# Patient Record
Sex: MALE | Race: WHITE | NOT HISPANIC OR LATINO | Employment: FULL TIME | URBAN - METROPOLITAN AREA
[De-identification: names, ages, dates, MRNs, and addresses within clinical notes are randomized per-mention and may not be internally consistent; named-entity substitution may affect disease eponyms.]

---

## 2017-01-08 DIAGNOSIS — N40.1 BPH WITH URINARY OBSTRUCTION: ICD-10-CM

## 2017-01-08 DIAGNOSIS — N13.8 BPH WITH URINARY OBSTRUCTION: ICD-10-CM

## 2017-01-09 RX ORDER — TAMSULOSIN HYDROCHLORIDE 0.4 MG/1
CAPSULE ORAL
Qty: 90 CAPSULE | Refills: 3 | Status: SHIPPED | OUTPATIENT
Start: 2017-01-09 | End: 2017-08-15 | Stop reason: SDUPTHER

## 2017-05-27 DIAGNOSIS — I10 ESSENTIAL HYPERTENSION: ICD-10-CM

## 2017-05-30 RX ORDER — LOSARTAN POTASSIUM AND HYDROCHLOROTHIAZIDE 12.5; 5 MG/1; MG/1
TABLET ORAL
Qty: 90 TABLET | Refills: 0 | Status: SHIPPED | OUTPATIENT
Start: 2017-05-30 | End: 2017-08-15 | Stop reason: SDUPTHER

## 2017-08-02 ENCOUNTER — TELEPHONE (OUTPATIENT)
Dept: INTERNAL MEDICINE | Facility: CLINIC | Age: 57
End: 2017-08-02

## 2017-08-02 DIAGNOSIS — Z00.00 ANNUAL PHYSICAL EXAM: Primary | ICD-10-CM

## 2017-08-02 NOTE — TELEPHONE ENCOUNTER
----- Message from Maribel Alejandra sent at 8/2/2017 12:10 PM CDT -----  Contact: self   Doctor appointment and lab have been scheduled.  Please link lab orders to the lab appointment.  Date of doctor appointment:  08/15  Physical or EP:  Physical  Date of lab appointment:  08/08  Comments:

## 2017-08-08 ENCOUNTER — LAB VISIT (OUTPATIENT)
Dept: LAB | Facility: HOSPITAL | Age: 57
End: 2017-08-08
Attending: INTERNAL MEDICINE
Payer: COMMERCIAL

## 2017-08-08 DIAGNOSIS — Z11.59 NEED FOR HEPATITIS C SCREENING TEST: ICD-10-CM

## 2017-08-08 DIAGNOSIS — Z00.00 ANNUAL PHYSICAL EXAM: ICD-10-CM

## 2017-08-08 LAB
ALBUMIN SERPL BCP-MCNC: 3.8 G/DL
ALP SERPL-CCNC: 67 U/L
ALT SERPL W/O P-5'-P-CCNC: 16 U/L
ANION GAP SERPL CALC-SCNC: 9 MMOL/L
AST SERPL-CCNC: 15 U/L
BASOPHILS # BLD AUTO: 0.01 K/UL
BASOPHILS NFR BLD: 0.2 %
BILIRUB SERPL-MCNC: 1 MG/DL
BUN SERPL-MCNC: 16 MG/DL
CALCIUM SERPL-MCNC: 9.5 MG/DL
CHLORIDE SERPL-SCNC: 104 MMOL/L
CHOLEST/HDLC SERPL: 4.6 {RATIO}
CO2 SERPL-SCNC: 26 MMOL/L
COMPLEXED PSA SERPL-MCNC: 3.3 NG/ML
CREAT SERPL-MCNC: 1.1 MG/DL
DIFFERENTIAL METHOD: ABNORMAL
EOSINOPHIL # BLD AUTO: 0.1 K/UL
EOSINOPHIL NFR BLD: 2.1 %
ERYTHROCYTE [DISTWIDTH] IN BLOOD BY AUTOMATED COUNT: 12.7 %
EST. GFR  (AFRICAN AMERICAN): >60 ML/MIN/1.73 M^2
EST. GFR  (NON AFRICAN AMERICAN): >60 ML/MIN/1.73 M^2
GLUCOSE SERPL-MCNC: 107 MG/DL
HCT VFR BLD AUTO: 41.2 %
HCV AB SERPL QL IA: NEGATIVE
HDL/CHOLESTEROL RATIO: 21.8 %
HDLC SERPL-MCNC: 225 MG/DL
HDLC SERPL-MCNC: 49 MG/DL
HGB BLD-MCNC: 13.9 G/DL
LDLC SERPL CALC-MCNC: 143.8 MG/DL
LYMPHOCYTES # BLD AUTO: 1.6 K/UL
LYMPHOCYTES NFR BLD: 33 %
MCH RBC QN AUTO: 30 PG
MCHC RBC AUTO-ENTMCNC: 33.7 G/DL
MCV RBC AUTO: 89 FL
MONOCYTES # BLD AUTO: 0.3 K/UL
MONOCYTES NFR BLD: 6.7 %
NEUTROPHILS # BLD AUTO: 2.8 K/UL
NEUTROPHILS NFR BLD: 58 %
NONHDLC SERPL-MCNC: 176 MG/DL
PLATELET # BLD AUTO: 204 K/UL
PMV BLD AUTO: 12.2 FL
POTASSIUM SERPL-SCNC: 4.3 MMOL/L
PROT SERPL-MCNC: 6.8 G/DL
RBC # BLD AUTO: 4.64 M/UL
SODIUM SERPL-SCNC: 139 MMOL/L
TRIGL SERPL-MCNC: 161 MG/DL
TSH SERPL DL<=0.005 MIU/L-ACNC: 3.19 UIU/ML
WBC # BLD AUTO: 4.79 K/UL

## 2017-08-08 PROCEDURE — 36415 COLL VENOUS BLD VENIPUNCTURE: CPT | Mod: PO

## 2017-08-08 PROCEDURE — 84153 ASSAY OF PSA TOTAL: CPT

## 2017-08-08 PROCEDURE — 85025 COMPLETE CBC W/AUTO DIFF WBC: CPT

## 2017-08-08 PROCEDURE — 84443 ASSAY THYROID STIM HORMONE: CPT

## 2017-08-08 PROCEDURE — 80061 LIPID PANEL: CPT

## 2017-08-08 PROCEDURE — 86803 HEPATITIS C AB TEST: CPT

## 2017-08-08 PROCEDURE — 80053 COMPREHEN METABOLIC PANEL: CPT

## 2017-08-15 ENCOUNTER — OFFICE VISIT (OUTPATIENT)
Dept: INTERNAL MEDICINE | Facility: CLINIC | Age: 57
End: 2017-08-15
Payer: COMMERCIAL

## 2017-08-15 VITALS
HEIGHT: 67 IN | WEIGHT: 170.63 LBS | HEART RATE: 84 BPM | DIASTOLIC BLOOD PRESSURE: 70 MMHG | SYSTOLIC BLOOD PRESSURE: 110 MMHG | TEMPERATURE: 98 F | BODY MASS INDEX: 26.78 KG/M2

## 2017-08-15 DIAGNOSIS — K60.2 ANAL FISSURE: ICD-10-CM

## 2017-08-15 DIAGNOSIS — N40.1 BPH WITH URINARY OBSTRUCTION: ICD-10-CM

## 2017-08-15 DIAGNOSIS — L98.9 SCALP LESION: ICD-10-CM

## 2017-08-15 DIAGNOSIS — Z00.00 ANNUAL PHYSICAL EXAM: Primary | ICD-10-CM

## 2017-08-15 DIAGNOSIS — N13.8 BPH WITH URINARY OBSTRUCTION: ICD-10-CM

## 2017-08-15 DIAGNOSIS — I10 ESSENTIAL HYPERTENSION: Chronic | ICD-10-CM

## 2017-08-15 PROCEDURE — 99999 PR PBB SHADOW E&M-EST. PATIENT-LVL III: CPT | Mod: PBBFAC,,, | Performed by: INTERNAL MEDICINE

## 2017-08-15 PROCEDURE — 99396 PREV VISIT EST AGE 40-64: CPT | Mod: S$GLB,,, | Performed by: INTERNAL MEDICINE

## 2017-08-15 RX ORDER — LOSARTAN POTASSIUM AND HYDROCHLOROTHIAZIDE 12.5; 5 MG/1; MG/1
1 TABLET ORAL DAILY
Qty: 90 TABLET | Refills: 3 | Status: SHIPPED | OUTPATIENT
Start: 2017-08-15 | End: 2017-11-03

## 2017-08-15 NOTE — PROGRESS NOTES
Subjective:       Patient ID: Nilton Chin is a 57 y.o. male.    Chief Complaint: Annual Exam    HPI   57y.o. Male here for annual exam.      Cholesterol: (normal)  Vaccines: Influenza (declined); Tetanus (2015)  Eye exam: 2016  Colonoscopy: 2015- NL per pt     Exercise: walks 3x/wk  Diet: regular     Past Medical History:    Allergy                                                       BPH (benign prostatic hypertrophy)                            History of prostatitis                                        Hypertension                                                History reviewed. No pertinent surgical history.  Social History    Marital status:              Spouse name:                       Years of education:                 Number of children: 3              Occupational History  Occupation          Employer            Comment                                              Social History Main Topics    Smoking status: Former Smoker                                                                Packs/day: 0.00      Years: 35.00          Types: Cigarettes       Start date: 6/24/2013    Smokeless status: Never Used                        Comment: 6 cig a day    Alcohol use: Yes                Comment: socially/ not weekly    Drug use: No              Sexual activity: Yes               Partners with: Female      -- Codeine -- Nausea Only   -- Vicodin [Hydrocodone-Acetaminophen]     --  unknown  Mr. Chin had no medications administered during this visit.  Review of Systems   Constitutional: Negative for activity change, appetite change, chills, diaphoresis, fatigue, fever and unexpected weight change.   HENT: Negative for congestion, mouth sores, postnasal drip, rhinorrhea, sinus pressure, sneezing, sore throat, trouble swallowing and voice change.    Eyes: Negative for discharge, itching and visual disturbance.   Respiratory: Negative for cough, chest tightness, shortness of breath and  wheezing.    Cardiovascular: Negative for chest pain, palpitations and leg swelling.   Gastrointestinal: Negative for abdominal pain, blood in stool, constipation, diarrhea, nausea and vomiting.   Endocrine: Negative for cold intolerance and heat intolerance.   Genitourinary: Negative for difficulty urinating, dysuria, flank pain, hematuria and urgency.   Musculoskeletal: Negative for arthralgias, back pain, myalgias and neck pain.   Skin: Negative for rash and wound.   Allergic/Immunologic: Negative for environmental allergies and food allergies.   Neurological: Negative for dizziness, tremors, seizures, syncope, weakness and headaches.   Hematological: Negative for adenopathy. Does not bruise/bleed easily.   Psychiatric/Behavioral: Negative for confusion, sleep disturbance and suicidal ideas. The patient is not nervous/anxious.        Objective:      Physical Exam   Constitutional: He is oriented to person, place, and time. He appears well-developed and well-nourished. No distress.   HENT:   Head: Normocephalic and atraumatic.   Right Ear: External ear normal.   Left Ear: External ear normal.   Nose: Nose normal.   Mouth/Throat: Oropharynx is clear and moist. No oropharyngeal exudate.   Eyes: Conjunctivae and EOM are normal. Pupils are equal, round, and reactive to light. Right eye exhibits no discharge. Left eye exhibits no discharge. No scleral icterus.   Neck: Normal range of motion. Neck supple. No JVD present. No thyromegaly present.   Cardiovascular: Normal rate, regular rhythm, normal heart sounds and intact distal pulses.    No murmur heard.  Pulmonary/Chest: Effort normal and breath sounds normal. No respiratory distress. He has no wheezes. He has no rales.   Abdominal: Soft. Bowel sounds are normal. He exhibits no distension. There is no tenderness. There is no guarding.   Musculoskeletal: He exhibits no edema.   Lymphadenopathy:     He has no cervical adenopathy.   Neurological: He is alert and oriented  to person, place, and time. He has normal reflexes. No cranial nerve deficit. Coordination normal.   Skin: Skin is warm and dry. No rash noted. He is not diaphoretic. No pallor.   Psychiatric: He has a normal mood and affect. Judgment normal.       Assessment:       1. Annual physical exam    2. Essential hypertension    3. BPH with urinary obstruction    4. Anal fissure    5. Scalp lesion        Plan:    1. Blood work reviewed with pt and was satisfactory       Vaccines: Influenza (declined); Tetanus (2015)       Eye exam: 2016       Colonoscopy: 2015- NL per pt   2. HTN- controlled on Hyzaar 50-12.5 mg daily    3. BPH- stable on Flomax 0.4 mg daily       Followed by Urology    4. Anal fissure- stable on topical meds, followed by GI   5. Referral to Derm   6. F/u in 1 yr or PRN

## 2017-09-21 ENCOUNTER — OFFICE VISIT (OUTPATIENT)
Dept: DERMATOLOGY | Facility: CLINIC | Age: 57
End: 2017-09-21
Payer: COMMERCIAL

## 2017-09-21 DIAGNOSIS — L81.4 LENTIGO: ICD-10-CM

## 2017-09-21 DIAGNOSIS — L82.1 SK (SEBORRHEIC KERATOSIS): ICD-10-CM

## 2017-09-21 DIAGNOSIS — D18.00 ANGIOMA: ICD-10-CM

## 2017-09-21 DIAGNOSIS — L57.0 AK (ACTINIC KERATOSIS): Primary | ICD-10-CM

## 2017-09-21 DIAGNOSIS — D22.9 NEVUS: ICD-10-CM

## 2017-09-21 PROCEDURE — 17000 DESTRUCT PREMALG LESION: CPT | Mod: S$GLB,,, | Performed by: DERMATOLOGY

## 2017-09-21 PROCEDURE — 3008F BODY MASS INDEX DOCD: CPT | Mod: S$GLB,,, | Performed by: DERMATOLOGY

## 2017-09-21 PROCEDURE — 99999 PR PBB SHADOW E&M-EST. PATIENT-LVL II: CPT | Mod: PBBFAC,,, | Performed by: DERMATOLOGY

## 2017-09-21 PROCEDURE — 99202 OFFICE O/P NEW SF 15 MIN: CPT | Mod: 25,S$GLB,, | Performed by: DERMATOLOGY

## 2017-09-21 NOTE — PROGRESS NOTES
Subjective:       Patient ID:  Nilton Chin is a 57 y.o. male who presents for   Chief Complaint   Patient presents with    Skin Check     Pt coming in today for UBSE. C/o white raised area on right scalp.  Raised. Not itching or bleeding. No tx   Pt has hx of extensive sun as a child.  Now he wears a hat regularly   Had mole removed  In the past and was benign per patient.         Review of Systems   Constitutional: Negative for fever, chills, fatigue and malaise.   Skin: Positive for activity-related sunscreen use and wears hat.   Hematologic/Lymphatic: Does not bruise/bleed easily.        Objective:    Physical Exam   Constitutional: He appears well-developed and well-nourished. No distress.   Neurological: He is alert and oriented to person, place, and time. He is not disoriented.   Psychiatric: He has a normal mood and affect.   Skin:   Areas Examined (abnormalities noted in diagram):   Scalp / Hair Palpated and Inspected  Head / Face Inspection Performed  Neck Inspection Performed  Chest / Axilla Inspection Performed  Abdomen Inspection Performed  Back Inspection Performed  RUE Inspected  LUE Inspection Performed  Nails and Digits Inspection Performed                   Diagram Legend     Erythematous scaling macule/papule c/w actinic keratosis       Vascular papule c/w angioma      Pigmented verrucoid papule/plaque c/w seborrheic keratosis      Yellow umbilicated papule c/w sebaceous hyperplasia      Irregularly shaped tan macule c/w lentigo     1-2 mm smooth white papules consistent with Milia      Movable subcutaneous cyst with punctum c/w epidermal inclusion cyst      Subcutaneous movable cyst c/w pilar cyst      Firm pink to brown papule c/w dermatofibroma      Pedunculated fleshy papule(s) c/w skin tag(s)      Evenly pigmented macule c/w junctional nevus     Mildly variegated pigmented, slightly irregular-bordered macule c/w mildly atypical nevus      Flesh colored to evenly pigmented papule c/w  intradermal nevus       Pink pearly papule/plaque c/w basal cell carcinoma      Erythematous hyperkeratotic cursted plaque c/w SCC      Surgical scar with no sign of skin cancer recurrence      Open and closed comedones      Inflammatory papules and pustules      Verrucoid papule consistent consistent with wart     Erythematous eczematous patches and plaques     Dystrophic onycholytic nail with subungual debris c/w onychomycosis     Umbilicated papule    Erythematous-base heme-crusted tan verrucoid plaque consistent with inflamed seborrheic keratosis     Erythematous Silvery Scaling Plaque c/w Psoriasis     See annotation      Assessment / Plan:        AK (actinic keratosis)  Cryosurgery Procedure Note    Verbal consent from the patient is obtained and the patient is aware of the precancerous quality and need for treatment of these lesions. Liquid nitrogen cryosurgery is applied to the 1 actinic keratoses, as detailed in the physical exam, to produce a freeze injury. The patient is aware that blisters may form and is instructed on wound care with gentle cleansing and use of vaseline ointment to keep moist until healed. The patient is supplied a handout on cryosurgery and is instructed to call if lesions do not completely resolve.    SK (seborrheic keratosis)  These are benign inherited growths without a malignant potential. Reassurance given to patient. No treatment is necessary.     Lentigo  This is a benign hyperpigmented sun induced lesion. Daily sun protection will reduce the number of new lesions. Treatment of these benign lesions are considered cosmetic.  The nature of sun-induced photo-aging and skin cancers is discussed.  Sun avoidance, protective clothing, and the use of 30-SPF sunscreens is advised. Observe closely for skin damage/changes, and call if such occurs.    Nevus  Discussed ABCDE's of nevi.  Monitor for new mole or moles that are becoming bigger, darker, irritated, or developing irregular  borders. Brochure provided.    Angioma  These are benign vascular lesions that are inherited.  Treatment is not necessary.    Upper body skin examination performed today including at least 6 points as noted in physical examination. No lesions suspicious for malignancy noted.             Return in about 1 year (around 9/21/2018).

## 2017-11-01 ENCOUNTER — LAB VISIT (OUTPATIENT)
Dept: LAB | Facility: HOSPITAL | Age: 57
End: 2017-11-01
Attending: INTERNAL MEDICINE
Payer: COMMERCIAL

## 2017-11-01 DIAGNOSIS — N13.8 BPH WITH URINARY OBSTRUCTION: ICD-10-CM

## 2017-11-01 DIAGNOSIS — N40.1 BPH WITH URINARY OBSTRUCTION: ICD-10-CM

## 2017-11-01 LAB — COMPLEXED PSA SERPL-MCNC: 2.8 NG/ML

## 2017-11-01 PROCEDURE — 36415 COLL VENOUS BLD VENIPUNCTURE: CPT | Mod: PO

## 2017-11-01 PROCEDURE — 84153 ASSAY OF PSA TOTAL: CPT

## 2017-11-03 DIAGNOSIS — I10 ESSENTIAL HYPERTENSION: ICD-10-CM

## 2017-11-03 RX ORDER — LOSARTAN POTASSIUM AND HYDROCHLOROTHIAZIDE 12.5; 5 MG/1; MG/1
TABLET ORAL
Qty: 90 TABLET | Refills: 3 | Status: SHIPPED | OUTPATIENT
Start: 2017-11-03 | End: 2018-12-02 | Stop reason: SDUPTHER

## 2017-11-08 ENCOUNTER — OFFICE VISIT (OUTPATIENT)
Dept: UROLOGY | Facility: CLINIC | Age: 57
End: 2017-11-08
Payer: COMMERCIAL

## 2017-11-08 VITALS
SYSTOLIC BLOOD PRESSURE: 146 MMHG | RESPIRATION RATE: 16 BRPM | WEIGHT: 165.13 LBS | BODY MASS INDEX: 25.92 KG/M2 | DIASTOLIC BLOOD PRESSURE: 84 MMHG | HEIGHT: 67 IN | HEART RATE: 77 BPM

## 2017-11-08 DIAGNOSIS — N13.8 BPH WITH URINARY OBSTRUCTION: Primary | ICD-10-CM

## 2017-11-08 DIAGNOSIS — N40.1 BPH WITH URINARY OBSTRUCTION: Primary | ICD-10-CM

## 2017-11-08 PROCEDURE — 99999 PR PBB SHADOW E&M-EST. PATIENT-LVL III: CPT | Mod: PBBFAC,,, | Performed by: NURSE PRACTITIONER

## 2017-11-08 PROCEDURE — 99214 OFFICE O/P EST MOD 30 MIN: CPT | Mod: S$GLB,,, | Performed by: NURSE PRACTITIONER

## 2017-11-08 RX ORDER — CICLOPIROX 80 MG/ML
SOLUTION TOPICAL
Refills: 3 | COMMUNITY
Start: 2017-10-06 | End: 2019-08-21

## 2017-11-08 NOTE — PROGRESS NOTES
Subjective:       Patient ID: Nilton Chin is a 57 y.o. male.    Chief Complaint: Benign Prostatic Hypertrophy    Nilton Chin is a 57 y.o. Male with history of BPH.  (-) family history for Prostate Cancer.  Last clinic visit was 11/02/2016  He was seen in past with Dr. Lopez 6/17/2015 for BPH and male pelvic pain    Today he reports no complaints.  FOS is good; nocturia 1-2x depending on fluid intake.  Flomax daily; PRN Mobic 15mg.    No issues with ED.                                  PSA                  2.8                 11/01/2017                 PSA                      3.3                 08/08/2017        PSA                  2.3                 10/26/2016                 PSA                      2.3                 06/02/2016        PSA                  2.2                 09/17/2015                 PSA                  1.9                 09/17/2014                 PSA                  1.2                 09/04/2013               PSA                      0.80                11/05/2012                 PSA                      2.26                09/27/2012                 PSA                      1.1                 07/15/2011                 PSA                      0.95                10/29/2009                 PSA                      0.74                10/08/2008                             Past Medical History:    Allergy                                                       BPH (benign prostatic hypertrophy)                            History of prostatitis                                        Hypertension                                                  History reviewed. No pertinent surgical history.    Review of patient's family history indicates:    Cataracts                      Mother                    Hypertension                   Mother                    Cataracts                      Father                    Glaucoma                       Father                    Hypertension                    Father                    Blindness                      Other                     Cancer                         Paternal Grandfather        Comment: lung/prostate    Diabetes                       Paternal Grandfather      Diabetes                       Maternal Grandmother      Heart disease                  Maternal Grandmother      Amblyopia                      Neg Hx                    Strabismus                     Neg Hx                    Retinal detachment             Neg Hx                    Macular degeneration           Neg Hx                    Stroke                         Neg Hx                      Social History    Marital status:              Spouse name:                       Years of education:                 Number of children: 3             Occupational History  Occupation          Employer            Comment                                             Social History Main Topics    Smoking status: Former Smoker                                                                Packs/day: 0.00      Years: 35.00          Types: Cigarettes       Start date: 6/24/2013    Smokeless status: Never Used                        Comment: 6 cig a day    Alcohol use: Yes                Comment: socially/ not weekly    Drug use: No              Sexual activity: Yes               Partners with: Female    Other Topics            Concern    None on file    Social History Narrative    None on file        Allergies:  Codeine and Vicodin (hydrocodone-acetaminophen)    Medications:  Current Outpatient Prescriptions:   losartan-hydrochlorothiazide 50-12.5 mg (HYZAAR) 50-12.5 mg per tablet, Take 1 tablet by mouth once daily., Disp: 90 tablet, Rfl: 3  tamsulosin (FLOMAX) 0.4 mg Cp24, TAKE ONE CAPSULE BY MOUTH ONCE DAILY., Disp: 90 capsule, Rfl: 0          Review of Systems   Constitutional: Negative.  Negative for activity change, appetite change and fever.   HENT: Negative.   Negative for facial swelling and trouble swallowing.    Eyes: Negative.    Respiratory: Negative.  Negative for shortness of breath.    Cardiovascular: Negative.  Negative for chest pain and palpitations.   Gastrointestinal: Negative for abdominal pain, constipation, diarrhea, nausea and vomiting.   Genitourinary: Negative for difficulty urinating, dysuria, enuresis, flank pain, frequency, genital sores, hematuria, nocturia, penile pain, scrotal swelling, testicular pain and urgency.        FOS is good  Nocturia 1-2  Frequency during day due to fluid pill for HTN.     Musculoskeletal: Negative for back pain, gait problem and neck stiffness.   Skin: Negative.  Negative for wound.   Neurological: Negative for dizziness, tremors, seizures, syncope, speech difficulty, light-headedness and headaches.   Hematological: Does not bruise/bleed easily.   Psychiatric/Behavioral: Negative for confusion and hallucinations. The patient is not nervous/anxious.        Objective:      Physical Exam   Nursing note and vitals reviewed.  Constitutional: He is oriented to person, place, and time. Vital signs are normal. He appears well-developed and well-nourished. He is active and cooperative.  Non-toxic appearance. He does not have a sickly appearance.   HENT:   Head: Normocephalic and atraumatic.   Right Ear: External ear normal.   Left Ear: External ear normal.   Nose: Nose normal.   Mouth/Throat: Mucous membranes are normal.   Eyes: Conjunctivae and lids are normal. No scleral icterus.   Neck: Trachea normal, normal range of motion and full passive range of motion without pain. Neck supple. No JVD present. No tracheal deviation present.   Cardiovascular: Normal rate, regular rhythm, S1 normal and S2 normal.    Pulmonary/Chest: Effort normal and breath sounds normal. No respiratory distress. He exhibits no tenderness.   Abdominal: Soft. Normal appearance and bowel sounds are normal. There is no hepatosplenomegaly. There is no  tenderness. There is no rebound, no guarding and no CVA tenderness.   Genitourinary: Rectum normal, testes normal and penis normal. Prostate is enlarged. Prostate is not tender. No penile tenderness.       Musculoskeletal: Normal range of motion.   Lymphadenopathy: No inguinal adenopathy noted on the right or left side.   Neurological: He is alert and oriented to person, place, and time. He has normal strength.   Skin: Skin is warm, dry and intact.     Psychiatric: He has a normal mood and affect. His behavior is normal. Judgment and thought content normal.       Assessment:       1. BPH with urinary obstruction        Plan:         I spent 25 minutes with the patient of which more than half was spent in direct consultation with the patient in regards to our treatment and plan.    Education and recommendations of today's plan of care including home remedies.  We discussed BPH and his LUTS  Diet modifications; daily exercise  Reviewed PSA numbers; the ups downs and previous over the past few years  Discussed the contributory factors for changes; voiced understanding  RTC one year year PSA (will see PCP who checks PSA before one year).

## 2017-11-29 ENCOUNTER — OFFICE VISIT (OUTPATIENT)
Dept: OPTOMETRY | Facility: CLINIC | Age: 57
End: 2017-11-29
Payer: COMMERCIAL

## 2017-11-29 DIAGNOSIS — Z83.511 FAMILY HISTORY OF GLAUCOMA: ICD-10-CM

## 2017-11-29 DIAGNOSIS — H04.123 DRY EYES, BILATERAL: Primary | ICD-10-CM

## 2017-11-29 PROCEDURE — 92015 DETERMINE REFRACTIVE STATE: CPT | Mod: S$GLB,,, | Performed by: OPTOMETRIST

## 2017-11-29 PROCEDURE — 92014 COMPRE OPH EXAM EST PT 1/>: CPT | Mod: S$GLB,,, | Performed by: OPTOMETRIST

## 2017-11-29 PROCEDURE — 99999 PR PBB SHADOW E&M-EST. PATIENT-LVL II: CPT | Mod: PBBFAC,,, | Performed by: OPTOMETRIST

## 2017-11-29 NOTE — PROGRESS NOTES
HPI     DLS: 11/16/2016  Pt states no va changes. Pt states he wear +2.50 otc readers. States he   has been using AT's more than usual. Denies f/f    Systane Bal ou BID/PRN     Hyp/presby   Fhx glaucoma   Ocular Ros Hx --see prev notes  Transient RUL Myokymia     Last edited by Arnulfo Blake, OD on 11/29/2017  8:46 AM. (History)        ROS     Positive for: Skin (rosacea), Eyes (ocular rosacea hx)    Negative for: Constitutional, Gastrointestinal, Neurological,   Genitourinary, Musculoskeletal, HENT, Endocrine, Cardiovascular,   Respiratory, Psychiatric, Allergic/Imm, Heme/Lymph    Last edited by Arnulfo Blake, OD on 11/29/2017  8:46 AM. (History)        Assessment /Plan     For exam results, see Encounter Report.    Dry eyes, bilateral    Family history of glaucoma        1. hyp/Presby--pt happy w otc readers  2. Family History of Glaucoma.  Advised yearly exam   3. Ocular rosacea hx--see prev notes.  Pt doing well on otc SYSTANE ATs for now      PLAN:    rtc 1 yr

## 2017-11-30 DIAGNOSIS — N40.1 BPH WITH URINARY OBSTRUCTION: ICD-10-CM

## 2017-11-30 DIAGNOSIS — N13.8 BPH WITH URINARY OBSTRUCTION: ICD-10-CM

## 2017-12-01 RX ORDER — TAMSULOSIN HYDROCHLORIDE 0.4 MG/1
CAPSULE ORAL
Qty: 90 CAPSULE | Refills: 3 | Status: SHIPPED | OUTPATIENT
Start: 2017-12-01 | End: 2018-12-01 | Stop reason: SDUPTHER

## 2017-12-22 ENCOUNTER — OFFICE VISIT (OUTPATIENT)
Dept: URGENT CARE | Facility: CLINIC | Age: 57
End: 2017-12-22
Payer: COMMERCIAL

## 2017-12-22 VITALS
RESPIRATION RATE: 16 BRPM | BODY MASS INDEX: 25.9 KG/M2 | HEART RATE: 100 BPM | SYSTOLIC BLOOD PRESSURE: 126 MMHG | TEMPERATURE: 97 F | WEIGHT: 165 LBS | DIASTOLIC BLOOD PRESSURE: 89 MMHG | HEIGHT: 67 IN | OXYGEN SATURATION: 98 %

## 2017-12-22 DIAGNOSIS — J40 BRONCHITIS: Primary | ICD-10-CM

## 2017-12-22 DIAGNOSIS — R05.9 COUGH: ICD-10-CM

## 2017-12-22 PROCEDURE — 99214 OFFICE O/P EST MOD 30 MIN: CPT | Mod: S$GLB,,, | Performed by: FAMILY MEDICINE

## 2017-12-22 RX ORDER — HYDROCODONE POLISTIREX AND CHLORPHENIRAMINE POLISTIREX 10; 8 MG/5ML; MG/5ML
5 SUSPENSION, EXTENDED RELEASE ORAL EVERY 12 HOURS PRN
Qty: 100 ML | Refills: 0 | Status: SHIPPED | OUTPATIENT
Start: 2017-12-22 | End: 2018-01-01

## 2017-12-22 RX ORDER — AZITHROMYCIN 250 MG/1
TABLET, FILM COATED ORAL
Qty: 6 TABLET | Refills: 0 | Status: SHIPPED | OUTPATIENT
Start: 2017-12-22 | End: 2018-06-20 | Stop reason: ALTCHOICE

## 2017-12-22 NOTE — PROGRESS NOTES
"Subjective:       Patient ID: Nilton Chin is a 57 y.o. male.    Vitals:  height is 5' 7" (1.702 m) and weight is 74.8 kg (165 lb). His temperature is 97.1 °F (36.2 °C). His blood pressure is 126/89 and his pulse is 100. His respiration is 16 and oxygen saturation is 98%.     Chief Complaint: URI    Pt states sympotms started on Monday and have gotten worse      URI    This is a new problem. The current episode started in the past 7 days. The problem has been gradually worsening. There has been no fever. Associated symptoms include congestion and coughing. Pertinent negatives include no abdominal pain, chest pain, ear pain, headaches, nausea, sore throat or wheezing. He has tried nothing for the symptoms.     Review of Systems   Constitution: Negative for chills, fever and malaise/fatigue.   HENT: Positive for congestion. Negative for ear pain, hoarse voice and sore throat.    Eyes: Negative for discharge and redness.   Cardiovascular: Negative for chest pain, dyspnea on exertion and leg swelling.   Respiratory: Positive for cough and sputum production. Negative for shortness of breath and wheezing.    Musculoskeletal: Negative for myalgias.   Gastrointestinal: Negative for abdominal pain and nausea.   Neurological: Negative for headaches.       Objective:      Physical Exam   Constitutional: He is oriented to person, place, and time. He appears well-developed and well-nourished.   HENT:   Head: Normocephalic and atraumatic.   Right Ear: External ear normal.   Left Ear: External ear normal.   Eyes: EOM are normal. Pupils are equal, round, and reactive to light.   Neck: Normal range of motion. Neck supple. No JVD present. No tracheal deviation present. No thyromegaly present.   Cardiovascular: Normal rate, regular rhythm and normal heart sounds.  Exam reveals no gallop and no friction rub.    No murmur heard.  Pulmonary/Chest: Breath sounds normal. No respiratory distress. He has no wheezes. He has no rales. He " exhibits no tenderness.   Abdominal: Soft. Bowel sounds are normal. He exhibits no distension and no mass. There is no tenderness. There is no rebound and no guarding. No hernia.   Musculoskeletal: Normal range of motion. He exhibits no edema, tenderness or deformity.   Lymphadenopathy:     He has no cervical adenopathy.   Neurological: He is alert and oriented to person, place, and time. He displays normal reflexes. No cranial nerve deficit. He exhibits normal muscle tone. Coordination normal.   Skin: Skin is warm. Capillary refill takes less than 2 seconds. No rash noted. No erythema. No pallor.   Psychiatric: He has a normal mood and affect. His behavior is normal. Judgment and thought content normal.   Vitals reviewed.      Assessment:       1. Bronchitis    2. Cough        Plan:         Bronchitis  -     azithromycin (Z-SONDRA) 250 MG tablet; Take 2 tablets by mouth x 1 for day 1 Then take 1 tablet by mouth daily for day 2 - 5  Dispense: 6 tablet; Refill: 0  -     hydrocodone-chlorpheniramine (TUSSIONEX) 10-8 mg/5 mL suspension; Take 5 mLs by mouth every 12 (twelve) hours as needed for Cough.  Dispense: 100 mL; Refill: 0    Cough      Follow up with your doctor in a few days as needed.  Return to the urgent care or go to the ER if symptoms get worse.    Jez Hill MD

## 2017-12-22 NOTE — PATIENT INSTRUCTIONS
What Is Acute Bronchitis?  Acute bronchitis is when the airways in your lungs (bronchial tubes) become red and swollen (inflamed). It is usually caused by a viral infection. But it can also occur because of a bacteria or allergen. Symptoms include a cough that produces yellow or greenish mucus and can last for days or sometimes weeks.  Inside healthy lungs    Air travels in and out of the lungs through the airways. The linings of these airways produce sticky mucus. This mucus traps particles that enter the lungs. Tiny structures called cilia then sweep the particles out of the airways.     Healthy airway: Airways are normally open. Air moves in and out easily.      Healthy cilia: Tiny, hairlike cilia sweep mucus and particles up and out of the airways.   Lungs with bronchitis  Bronchitis often occurs with a cold or the flu virus. The airways become inflamed (red and swollen). There is a deep hacking cough from the extra mucus. Other symptoms may include:  · Wheezing  · Chest discomfort  · Shortness of breath  · Mild fever  A second infection, this time due to bacteria, may then occur. And airways irritated by allergens or smoke are more likely to get infected.        Inflamed airway: Inflammation and extra mucus narrow the airway, causing shortness of breath.      Impaired cilia: Extra mucus impairs cilia, causing congestion and wheezing. Smoking makes the problem worse.   Making a diagnosis  A physical exam, health history, and certain tests help your healthcare provider make the diagnosis.  Health history  Your healthcare provider will ask you about your symptoms.  The exam  Your provider listens to your chest for signs of congestion. He or she may also check your ears, nose, and throat.  Possible tests  · A sputum test for bacteria. This requires a sample of mucus from your lungs.  · A nasal or throat swab. This tests to see if you have a bacterial infection.  · A chest X-ray. This is done if your healthcare  provider thinks you have pneumonia.  · Tests to check for an underlying condition. Other tests may be done to check for things such as allergies, asthma, or COPD (chronic obstructive pulmonary disease). You may need to see a specialist for more lung function testing.  Treating a cough  The main treatment for bronchitis is easing symptoms. Avoiding smoke, allergens, and other things that trigger coughing can often help. If the infection is bacterial, you may be given antibiotics. During the illness, it's important to get plenty of sleep. To ease symptoms:  · Dont smoke. Also avoid secondhand smoke.  · Use a humidifier. Or try breathing in steam from a hot shower. This may help loosen mucus.  · Drink a lot of water and juice. They can soothe the throat and may help thin mucus.  · Sit up or use extra pillows when in bed. This helps to lessen coughing and congestion.  · Ask your provider about using medicine. Ask about using cough medicine, pain and fever medicine, or a decongestant.  Antibiotics  Most cases of bronchitis are caused by cold or flu viruses. They dont need antibiotics to treat them, even if your mucus is thick and green or yellow. Antibiotics dont treat viral illness and antibiotics have not been shown to have any benefit in cases of acute bronchitis. Taking antibiotics when they are not needed increases your risk of getting an infection later that is antibiotic-resistant. Antibiotics can also cause severe cases of diarrhea that require other antibiotics to treat.  It is important that you accept your healthcare provider's opinion to not use antibiotics. Your provider will prescribe antibiotics if the infection is caused by bacteria. If they are prescribed:  · Take all of the medicine. Take the medicine until it is used up, even if symptoms have improved. If you dont, the bronchitis may come back.  · Take the medicines as directed. For instance, some medicines should be taken with food.  · Ask about  side effects. Ask your provider or pharmacist what side effects are common, and what to do about them.  Follow-up care  You should see your provider again in 2 to 3 weeks. By this time, symptoms should have improved. An infection that lasts longer may mean you have a more serious problem.  Prevention  · Avoid tobacco smoke. If you smoke, quit. Stay away from smoky places. Ask friends and family not to smoke around you, or in your home or car.  · Get checked for allergies.  · Ask your provider about getting a yearly flu shot. Also ask about pneumococcal or pneumonia shots.  · Wash your hands often. This helps reduce the chance of picking up viruses that cause colds and flu.  Call your healthcare provider if:  · Symptoms worsen, or you have new symptoms  · Breathing problems worsen or  become severe  · Symptoms dont get better within a week, or within 3 days of taking antibiotics   Date Last Reviewed: 2/1/2017  © 3397-6882 [employer]. [address] [place] [address]. All rights reserved. This information is not intended as a substitute for professional medical care. Always follow your healthcare professional's instructions.      Follow up with your doctor in a few days as needed.  Return to the urgent care or go to the ER if symptoms get worse.    Jez Hill MD

## 2018-02-26 ENCOUNTER — OFFICE VISIT (OUTPATIENT)
Dept: URGENT CARE | Facility: CLINIC | Age: 58
End: 2018-02-26
Payer: COMMERCIAL

## 2018-02-26 VITALS
WEIGHT: 165 LBS | RESPIRATION RATE: 19 BRPM | HEIGHT: 67 IN | TEMPERATURE: 97 F | DIASTOLIC BLOOD PRESSURE: 80 MMHG | BODY MASS INDEX: 25.9 KG/M2 | SYSTOLIC BLOOD PRESSURE: 135 MMHG | OXYGEN SATURATION: 97 % | HEART RATE: 98 BPM

## 2018-02-26 DIAGNOSIS — J20.9 ACUTE PURULENT BRONCHITIS: Primary | ICD-10-CM

## 2018-02-26 PROCEDURE — 96372 THER/PROPH/DIAG INJ SC/IM: CPT | Mod: S$GLB,,, | Performed by: INTERNAL MEDICINE

## 2018-02-26 PROCEDURE — 3008F BODY MASS INDEX DOCD: CPT | Mod: S$GLB,,, | Performed by: INTERNAL MEDICINE

## 2018-02-26 PROCEDURE — 99213 OFFICE O/P EST LOW 20 MIN: CPT | Mod: 25,S$GLB,, | Performed by: INTERNAL MEDICINE

## 2018-02-26 RX ORDER — BETAMETHASONE SODIUM PHOSPHATE AND BETAMETHASONE ACETATE 3; 3 MG/ML; MG/ML
9 INJECTION, SUSPENSION INTRA-ARTICULAR; INTRALESIONAL; INTRAMUSCULAR; SOFT TISSUE ONCE
Status: COMPLETED | OUTPATIENT
Start: 2018-02-26 | End: 2018-02-26

## 2018-02-26 RX ORDER — AZITHROMYCIN 250 MG/1
TABLET, FILM COATED ORAL
Qty: 6 TABLET | Refills: 0 | Status: SHIPPED | OUTPATIENT
Start: 2018-02-26 | End: 2018-06-20 | Stop reason: ALTCHOICE

## 2018-02-26 RX ADMIN — BETAMETHASONE SODIUM PHOSPHATE AND BETAMETHASONE ACETATE 9 MG: 3; 3 INJECTION, SUSPENSION INTRA-ARTICULAR; INTRALESIONAL; INTRAMUSCULAR; SOFT TISSUE at 09:02

## 2018-02-26 NOTE — PROGRESS NOTES
"Subjective:       Patient ID: Nilton Chin is a 57 y.o. male.    Vitals:  height is 5' 7" (1.702 m) and weight is 74.8 kg (165 lb). His oral temperature is 97.1 °F (36.2 °C). His blood pressure is 135/80 and his pulse is 98. His respiration is 19 and oxygen saturation is 97%.     Chief Complaint: Cough    Cough   This is a new problem. The current episode started in the past 7 days. The problem has been unchanged. The problem occurs constantly. The cough is productive of sputum. Associated symptoms include postnasal drip. Pertinent negatives include no chest pain, chills, ear pain, eye redness, fever, headaches, myalgias, sore throat, shortness of breath or wheezing. Nothing aggravates the symptoms. He has tried OTC cough suppressant and prescription cough suppressant for the symptoms. The treatment provided mild relief.     Review of Systems   Constitution: Negative for chills, fever and malaise/fatigue.   HENT: Positive for postnasal drip. Negative for congestion, ear pain, hoarse voice and sore throat.    Eyes: Negative for discharge and redness.   Cardiovascular: Negative for chest pain, dyspnea on exertion and leg swelling.   Respiratory: Positive for cough and sputum production. Negative for shortness of breath and wheezing.    Musculoskeletal: Negative for myalgias.   Gastrointestinal: Negative for abdominal pain and nausea.   Neurological: Negative for headaches.       Objective:      Physical Exam   Constitutional: He appears well-developed and well-nourished.   HENT:   Head: Normocephalic and atraumatic.   Eyes: Conjunctivae and EOM are normal. Pupils are equal, round, and reactive to light.   Neck: Normal range of motion. Neck supple.   Cardiovascular: Normal rate and regular rhythm.    Pulmonary/Chest: Effort normal.   Upper airway congestion with scant exp wheeze   Nursing note and vitals reviewed.      Assessment:       1. Acute purulent bronchitis        Plan:         Acute purulent bronchitis  -    "  betamethasone acetate-betamethasone sodium phosphate injection 9 mg; Inject 1.5 mLs (9 mg total) into the muscle once.  -     azithromycin (ZITHROMAX Z-SONDRA) 250 MG tablet; Take 2 tablets (500 mg) on  Day 1,  followed by 1 tablet (250 mg) once daily on Days 2 through 5.  Dispense: 6 tablet; Refill: 0

## 2018-05-16 ENCOUNTER — TELEPHONE (OUTPATIENT)
Dept: INTERNAL MEDICINE | Facility: CLINIC | Age: 58
End: 2018-05-16

## 2018-05-16 DIAGNOSIS — Z00.00 ANNUAL PHYSICAL EXAM: Primary | ICD-10-CM

## 2018-05-16 NOTE — TELEPHONE ENCOUNTER
----- Message from Heather Holliday sent at 5/16/2018 12:01 PM CDT -----  Contact: Patient [phone]   I have scheduled the labs and physical. Labs orders need to be placed an linked to the appt.     Date of Physical: 08/15/2018    Date of Lab: 08/08/2018    Please call and advise.    Thank you

## 2018-06-20 ENCOUNTER — OFFICE VISIT (OUTPATIENT)
Dept: URGENT CARE | Facility: CLINIC | Age: 58
End: 2018-06-20
Payer: COMMERCIAL

## 2018-06-20 VITALS
TEMPERATURE: 100 F | BODY MASS INDEX: 25.9 KG/M2 | RESPIRATION RATE: 18 BRPM | HEART RATE: 96 BPM | OXYGEN SATURATION: 99 % | DIASTOLIC BLOOD PRESSURE: 83 MMHG | SYSTOLIC BLOOD PRESSURE: 135 MMHG | WEIGHT: 165 LBS | HEIGHT: 67 IN

## 2018-06-20 DIAGNOSIS — J98.01 ACUTE BRONCHOSPASM: ICD-10-CM

## 2018-06-20 DIAGNOSIS — J01.90 ACUTE BACTERIAL SINUSITIS: Primary | ICD-10-CM

## 2018-06-20 DIAGNOSIS — J20.9 ACUTE PURULENT BRONCHITIS: ICD-10-CM

## 2018-06-20 DIAGNOSIS — B96.89 ACUTE BACTERIAL SINUSITIS: Primary | ICD-10-CM

## 2018-06-20 PROCEDURE — 3008F BODY MASS INDEX DOCD: CPT | Mod: CPTII,S$GLB,, | Performed by: INTERNAL MEDICINE

## 2018-06-20 PROCEDURE — 99214 OFFICE O/P EST MOD 30 MIN: CPT | Mod: 25,S$GLB,, | Performed by: INTERNAL MEDICINE

## 2018-06-20 PROCEDURE — 3075F SYST BP GE 130 - 139MM HG: CPT | Mod: CPTII,S$GLB,, | Performed by: INTERNAL MEDICINE

## 2018-06-20 PROCEDURE — 96372 THER/PROPH/DIAG INJ SC/IM: CPT | Mod: S$GLB,,, | Performed by: INTERNAL MEDICINE

## 2018-06-20 PROCEDURE — 3079F DIAST BP 80-89 MM HG: CPT | Mod: CPTII,S$GLB,, | Performed by: INTERNAL MEDICINE

## 2018-06-20 RX ORDER — BETAMETHASONE SODIUM PHOSPHATE AND BETAMETHASONE ACETATE 3; 3 MG/ML; MG/ML
9 INJECTION, SUSPENSION INTRA-ARTICULAR; INTRALESIONAL; INTRAMUSCULAR; SOFT TISSUE ONCE
Status: COMPLETED | OUTPATIENT
Start: 2018-06-20 | End: 2018-06-20

## 2018-06-20 RX ORDER — AMOXICILLIN AND CLAVULANATE POTASSIUM 875; 125 MG/1; MG/1
1 TABLET, FILM COATED ORAL EVERY 12 HOURS
Qty: 20 TABLET | Refills: 0 | Status: SHIPPED | OUTPATIENT
Start: 2018-06-20 | End: 2018-06-30

## 2018-06-20 RX ADMIN — BETAMETHASONE SODIUM PHOSPHATE AND BETAMETHASONE ACETATE 9 MG: 3; 3 INJECTION, SUSPENSION INTRA-ARTICULAR; INTRALESIONAL; INTRAMUSCULAR; SOFT TISSUE at 09:06

## 2018-06-20 NOTE — PROGRESS NOTES
"Subjective:       Patient ID: Nilton Chin is a 58 y.o. male.    Vitals:  height is 5' 7" (1.702 m) and weight is 74.8 kg (165 lb). His oral temperature is 100.2 °F (37.9 °C). His blood pressure is 135/83 and his pulse is 96. His respiration is 18 and oxygen saturation is 99%.     Chief Complaint: URI    URI    This is a new problem. The current episode started 1 to 4 weeks ago. The problem has been unchanged. There has been no fever. Associated symptoms include congestion, coughing, rhinorrhea and sinus pain. Pertinent negatives include no abdominal pain, chest pain, ear pain, headaches, nausea, sore throat or wheezing. He has tried antihistamine and decongestant (Claritin D and delsym) for the symptoms. The treatment provided no relief.     Review of Systems   Constitution: Negative for chills, fever and malaise/fatigue.   HENT: Positive for congestion, rhinorrhea and sinus pain. Negative for ear pain, hoarse voice and sore throat.    Eyes: Negative for discharge and redness.   Cardiovascular: Negative for chest pain, dyspnea on exertion and leg swelling.   Respiratory: Positive for cough and sputum production. Negative for shortness of breath and wheezing.    Musculoskeletal: Negative for myalgias.   Gastrointestinal: Negative for abdominal pain and nausea.   Neurological: Negative for headaches.       Objective:      Physical Exam   Constitutional: He appears well-developed and well-nourished.   HENT:   Head: Normocephalic and atraumatic.   Nose: Mucosal edema (purulent discharge) present. Right sinus exhibits maxillary sinus tenderness. Left sinus exhibits maxillary sinus tenderness.   Eyes: Conjunctivae and EOM are normal. Pupils are equal, round, and reactive to light.   Neck: Normal range of motion. Neck supple.   Cardiovascular: Normal rate and regular rhythm.    Pulmonary/Chest: Effort normal.   Diffuse airway congestion with end exp wheeze   Nursing note and vitals reviewed.      Assessment:       1. " Acute bacterial sinusitis    2. Acute purulent bronchitis    3. Acute bronchospasm        Plan:         Acute bacterial sinusitis  -     betamethasone acetate-betamethasone sodium phosphate injection 9 mg; Inject 1.5 mLs (9 mg total) into the muscle once.  -     amoxicillin-clavulanate 875-125mg (AUGMENTIN) 875-125 mg per tablet; Take 1 tablet by mouth every 12 (twelve) hours.  Dispense: 20 tablet; Refill: 0    Acute purulent bronchitis  -     betamethasone acetate-betamethasone sodium phosphate injection 9 mg; Inject 1.5 mLs (9 mg total) into the muscle once.  -     amoxicillin-clavulanate 875-125mg (AUGMENTIN) 875-125 mg per tablet; Take 1 tablet by mouth every 12 (twelve) hours.  Dispense: 20 tablet; Refill: 0    Acute bronchospasm  -     betamethasone acetate-betamethasone sodium phosphate injection 9 mg; Inject 1.5 mLs (9 mg total) into the muscle once.

## 2018-06-23 ENCOUNTER — OFFICE VISIT (OUTPATIENT)
Dept: URGENT CARE | Facility: CLINIC | Age: 58
End: 2018-06-23
Payer: COMMERCIAL

## 2018-06-23 VITALS
BODY MASS INDEX: 25.9 KG/M2 | HEART RATE: 83 BPM | TEMPERATURE: 98 F | OXYGEN SATURATION: 99 % | WEIGHT: 165 LBS | DIASTOLIC BLOOD PRESSURE: 88 MMHG | HEIGHT: 67 IN | SYSTOLIC BLOOD PRESSURE: 133 MMHG

## 2018-06-23 DIAGNOSIS — J20.9 ACUTE PURULENT BRONCHITIS: Primary | ICD-10-CM

## 2018-06-23 DIAGNOSIS — J98.01 ACUTE BRONCHOSPASM: ICD-10-CM

## 2018-06-23 PROCEDURE — 96372 THER/PROPH/DIAG INJ SC/IM: CPT | Mod: S$GLB,,, | Performed by: INTERNAL MEDICINE

## 2018-06-23 PROCEDURE — 3008F BODY MASS INDEX DOCD: CPT | Mod: CPTII,S$GLB,, | Performed by: INTERNAL MEDICINE

## 2018-06-23 PROCEDURE — 3075F SYST BP GE 130 - 139MM HG: CPT | Mod: CPTII,S$GLB,, | Performed by: INTERNAL MEDICINE

## 2018-06-23 PROCEDURE — 99213 OFFICE O/P EST LOW 20 MIN: CPT | Mod: 25,S$GLB,, | Performed by: INTERNAL MEDICINE

## 2018-06-23 PROCEDURE — 3079F DIAST BP 80-89 MM HG: CPT | Mod: CPTII,S$GLB,, | Performed by: INTERNAL MEDICINE

## 2018-06-23 RX ORDER — LEVOFLOXACIN 500 MG/1
500 TABLET, FILM COATED ORAL DAILY
Qty: 10 TABLET | Refills: 0 | Status: SHIPPED | OUTPATIENT
Start: 2018-06-23 | End: 2018-07-03

## 2018-06-23 RX ORDER — BETAMETHASONE SODIUM PHOSPHATE AND BETAMETHASONE ACETATE 3; 3 MG/ML; MG/ML
9 INJECTION, SUSPENSION INTRA-ARTICULAR; INTRALESIONAL; INTRAMUSCULAR; SOFT TISSUE ONCE
Status: COMPLETED | OUTPATIENT
Start: 2018-06-23 | End: 2018-06-23

## 2018-06-23 RX ORDER — CODEINE PHOSPHATE AND GUAIFENESIN 10; 100 MG/5ML; MG/5ML
5 SOLUTION ORAL EVERY 4 HOURS PRN
Qty: 118 ML | Refills: 0 | Status: SHIPPED | OUTPATIENT
Start: 2018-06-23 | End: 2018-07-03

## 2018-06-23 RX ADMIN — BETAMETHASONE SODIUM PHOSPHATE AND BETAMETHASONE ACETATE 9 MG: 3; 3 INJECTION, SUSPENSION INTRA-ARTICULAR; INTRALESIONAL; INTRAMUSCULAR; SOFT TISSUE at 06:06

## 2018-06-23 NOTE — PROGRESS NOTES
"Subjective:       Patient ID: Nilton Chin is a 58 y.o. male.    Vitals:  height is 5' 7" (1.702 m) and weight is 74.8 kg (165 lb). His temperature is 97.8 °F (36.6 °C). His blood pressure is 133/88 and his pulse is 83. His oxygen saturation is 99%.     Chief Complaint: Cough    Cough   This is a new problem. The current episode started in the past 7 days. The problem has been gradually worsening. The problem occurs every few minutes. The cough is non-productive. The symptoms are aggravated by lying down. Treatments tried: patient wants codiene cough syrup. The treatment provided no relief.     Review of Systems   Respiratory: Positive for cough.        Objective:      Physical Exam   Constitutional: He appears well-developed and well-nourished.   HENT:   Head: Normocephalic and atraumatic.   Eyes: Conjunctivae and EOM are normal. Pupils are equal, round, and reactive to light.   Neck: Normal range of motion. Neck supple.   Cardiovascular: Normal rate and regular rhythm.    Pulmonary/Chest: Effort normal.   Diffuse congestion with exp wheeze   Nursing note and vitals reviewed.      Assessment:       1. Acute purulent bronchitis    2. Acute bronchospasm        Plan:         Acute purulent bronchitis  -     betamethasone acetate-betamethasone sodium phosphate injection 9 mg; Inject 1.5 mLs (9 mg total) into the muscle once.  -     levoFLOXacin (LEVAQUIN) 500 MG tablet; Take 1 tablet (500 mg total) by mouth once daily. for 10 days  Dispense: 10 tablet; Refill: 0  -     guaifenesin-codeine 100-10 mg/5 ml (CHERATUSSIN AC)  mg/5 mL syrup; Take 5 mLs by mouth every 4 (four) hours as needed for Cough.  Dispense: 118 mL; Refill: 0    Acute bronchospasm  -     betamethasone acetate-betamethasone sodium phosphate injection 9 mg; Inject 1.5 mLs (9 mg total) into the muscle once.         "

## 2018-08-08 ENCOUNTER — LAB VISIT (OUTPATIENT)
Dept: LAB | Facility: HOSPITAL | Age: 58
End: 2018-08-08
Attending: INTERNAL MEDICINE
Payer: COMMERCIAL

## 2018-08-08 DIAGNOSIS — Z00.00 ANNUAL PHYSICAL EXAM: ICD-10-CM

## 2018-08-08 LAB
ALBUMIN SERPL BCP-MCNC: 4.1 G/DL
ALP SERPL-CCNC: 60 U/L
ALT SERPL W/O P-5'-P-CCNC: 29 U/L
ANION GAP SERPL CALC-SCNC: 8 MMOL/L
AST SERPL-CCNC: 25 U/L
BASOPHILS # BLD AUTO: 0.03 K/UL
BASOPHILS NFR BLD: 0.6 %
BILIRUB SERPL-MCNC: 1.5 MG/DL
BUN SERPL-MCNC: 14 MG/DL
CALCIUM SERPL-MCNC: 9.8 MG/DL
CHLORIDE SERPL-SCNC: 102 MMOL/L
CHOLEST SERPL-MCNC: 247 MG/DL
CHOLEST/HDLC SERPL: 4.9 {RATIO}
CO2 SERPL-SCNC: 30 MMOL/L
COMPLEXED PSA SERPL-MCNC: 3.5 NG/ML
CREAT SERPL-MCNC: 1 MG/DL
DIFFERENTIAL METHOD: NORMAL
EOSINOPHIL # BLD AUTO: 0.1 K/UL
EOSINOPHIL NFR BLD: 1.7 %
ERYTHROCYTE [DISTWIDTH] IN BLOOD BY AUTOMATED COUNT: 12.4 %
EST. GFR  (AFRICAN AMERICAN): >60 ML/MIN/1.73 M^2
EST. GFR  (NON AFRICAN AMERICAN): >60 ML/MIN/1.73 M^2
GLUCOSE SERPL-MCNC: 94 MG/DL
HCT VFR BLD AUTO: 44.2 %
HDLC SERPL-MCNC: 50 MG/DL
HDLC SERPL: 20.2 %
HGB BLD-MCNC: 14.3 G/DL
IMM GRANULOCYTES # BLD AUTO: 0.02 K/UL
IMM GRANULOCYTES NFR BLD AUTO: 0.4 %
LDLC SERPL CALC-MCNC: 172 MG/DL
LYMPHOCYTES # BLD AUTO: 1.6 K/UL
LYMPHOCYTES NFR BLD: 29.8 %
MCH RBC QN AUTO: 30.5 PG
MCHC RBC AUTO-ENTMCNC: 32.4 G/DL
MCV RBC AUTO: 94 FL
MONOCYTES # BLD AUTO: 0.5 K/UL
MONOCYTES NFR BLD: 9.2 %
NEUTROPHILS # BLD AUTO: 3.1 K/UL
NEUTROPHILS NFR BLD: 58.3 %
NONHDLC SERPL-MCNC: 197 MG/DL
NRBC BLD-RTO: 0 /100 WBC
PLATELET # BLD AUTO: 209 K/UL
PMV BLD AUTO: 12.6 FL
POTASSIUM SERPL-SCNC: 4.4 MMOL/L
PROT SERPL-MCNC: 7.1 G/DL
RBC # BLD AUTO: 4.69 M/UL
SODIUM SERPL-SCNC: 140 MMOL/L
TRIGL SERPL-MCNC: 125 MG/DL
TSH SERPL DL<=0.005 MIU/L-ACNC: 3.5 UIU/ML
WBC # BLD AUTO: 5.33 K/UL

## 2018-08-08 PROCEDURE — 36415 COLL VENOUS BLD VENIPUNCTURE: CPT | Mod: PO

## 2018-08-08 PROCEDURE — 85025 COMPLETE CBC W/AUTO DIFF WBC: CPT

## 2018-08-08 PROCEDURE — 80053 COMPREHEN METABOLIC PANEL: CPT

## 2018-08-08 PROCEDURE — 84443 ASSAY THYROID STIM HORMONE: CPT

## 2018-08-08 PROCEDURE — 84153 ASSAY OF PSA TOTAL: CPT

## 2018-08-08 PROCEDURE — 80061 LIPID PANEL: CPT

## 2018-08-15 ENCOUNTER — OFFICE VISIT (OUTPATIENT)
Dept: INTERNAL MEDICINE | Facility: CLINIC | Age: 58
End: 2018-08-15
Payer: COMMERCIAL

## 2018-08-15 VITALS
TEMPERATURE: 99 F | BODY MASS INDEX: 26.26 KG/M2 | HEART RATE: 68 BPM | SYSTOLIC BLOOD PRESSURE: 130 MMHG | HEIGHT: 67 IN | DIASTOLIC BLOOD PRESSURE: 80 MMHG | WEIGHT: 167.31 LBS | RESPIRATION RATE: 18 BRPM

## 2018-08-15 DIAGNOSIS — N40.1 BPH WITH URINARY OBSTRUCTION: ICD-10-CM

## 2018-08-15 DIAGNOSIS — Z00.00 ANNUAL PHYSICAL EXAM: Primary | ICD-10-CM

## 2018-08-15 DIAGNOSIS — K60.2 ANAL FISSURE: ICD-10-CM

## 2018-08-15 DIAGNOSIS — E78.00 HYPERCHOLESTEREMIA: ICD-10-CM

## 2018-08-15 DIAGNOSIS — I10 ESSENTIAL HYPERTENSION: Chronic | ICD-10-CM

## 2018-08-15 DIAGNOSIS — N13.8 BPH WITH URINARY OBSTRUCTION: ICD-10-CM

## 2018-08-15 PROCEDURE — 3079F DIAST BP 80-89 MM HG: CPT | Mod: CPTII,S$GLB,, | Performed by: INTERNAL MEDICINE

## 2018-08-15 PROCEDURE — 99999 PR PBB SHADOW E&M-EST. PATIENT-LVL III: CPT | Mod: PBBFAC,,, | Performed by: INTERNAL MEDICINE

## 2018-08-15 PROCEDURE — 3075F SYST BP GE 130 - 139MM HG: CPT | Mod: CPTII,S$GLB,, | Performed by: INTERNAL MEDICINE

## 2018-08-15 PROCEDURE — 99396 PREV VISIT EST AGE 40-64: CPT | Mod: S$GLB,,, | Performed by: INTERNAL MEDICINE

## 2018-08-15 NOTE — PROGRESS NOTES
Subjective:       Patient ID: Nilton Chin is a 58 y.o. male.    Chief Complaint: Annual Exam    HPI   58 y.o. Male here for annual exam.      Cholesterol: (normal)  Vaccines: Influenza (declined); Tetanus (2015)  Eye exam: 2016  Colonoscopy: 2015- NL per pt     Exercise: walks 3x/wk  Diet: regular     Past Medical History:    Allergy                                                       BPH (benign prostatic hypertrophy)                            History of prostatitis                                        Hypertension                                                History reviewed. No pertinent surgical history.  Social History    Marital status:              Spouse name:                       Years of education:                 Number of children: 3              Occupational History  Occupation          Employer            Comment                                              Social History Main Topics    Smoking status: Former Smoker                                                                Packs/day: 0.00      Years: 35.00          Types: Cigarettes       Start date: 6/24/2013    Smokeless status: Never Used                        Comment: 6 cig a day    Alcohol use: Yes                Comment: socially/ not weekly    Drug use: No              Sexual activity: Yes               Partners with: Female      -- Codeine -- Nausea Only   -- Vicodin [Hydrocodone-Acetaminophen]     --  unknown  Mr. Chin had no medications administered during this visit.  Review of Systems   Constitutional: Negative for activity change, appetite change, chills, diaphoresis, fatigue, fever and unexpected weight change.   HENT: Negative for congestion, mouth sores, postnasal drip, rhinorrhea, sinus pressure, sneezing, sore throat, trouble swallowing and voice change.    Eyes: Negative for discharge, itching and visual disturbance.   Respiratory: Negative for cough, chest tightness, shortness of breath and  wheezing.    Cardiovascular: Negative for chest pain, palpitations and leg swelling.   Gastrointestinal: Negative for abdominal pain, blood in stool, constipation, diarrhea, nausea and vomiting.   Endocrine: Negative for cold intolerance and heat intolerance.   Genitourinary: Negative for difficulty urinating, dysuria, flank pain, hematuria and urgency.   Musculoskeletal: Negative for arthralgias, back pain, myalgias and neck pain.   Skin: Negative for rash and wound.   Allergic/Immunologic: Negative for environmental allergies and food allergies.   Neurological: Negative for dizziness, tremors, seizures, syncope, weakness and headaches.   Hematological: Negative for adenopathy. Does not bruise/bleed easily.   Psychiatric/Behavioral: Negative for confusion, sleep disturbance and suicidal ideas. The patient is not nervous/anxious.        Objective:      Physical Exam   Constitutional: He is oriented to person, place, and time. He appears well-developed and well-nourished. No distress.   HENT:   Head: Normocephalic and atraumatic.   Right Ear: External ear normal.   Left Ear: External ear normal.   Nose: Nose normal.   Mouth/Throat: Oropharynx is clear and moist. No oropharyngeal exudate.   Eyes: Conjunctivae and EOM are normal. Pupils are equal, round, and reactive to light. Right eye exhibits no discharge. Left eye exhibits no discharge. No scleral icterus.   Neck: Normal range of motion. Neck supple. No JVD present. No thyromegaly present.   Cardiovascular: Normal rate, regular rhythm, normal heart sounds and intact distal pulses.   No murmur heard.  Pulmonary/Chest: Effort normal and breath sounds normal. No respiratory distress. He has no wheezes. He has no rales.   Abdominal: Soft. Bowel sounds are normal. He exhibits no distension. There is no tenderness. There is no guarding.   Musculoskeletal: He exhibits no edema.   Lymphadenopathy:     He has no cervical adenopathy.   Neurological: He is alert and oriented  to person, place, and time. No cranial nerve deficit. Coordination normal.   Skin: Skin is warm and dry. No rash noted. He is not diaphoretic. No pallor.   Psychiatric: He has a normal mood and affect. Judgment normal.       Assessment:       1. Annual physical exam    2. Essential hypertension    3. BPH with urinary obstruction    4. Anal fissure    5. Hypercholesteremia        Plan:    1. Blood work reviewed with pt and was satisfactory       Vaccines: Influenza (declined); Tetanus (2015)       Eye exam: 2016       Colonoscopy: 2015- NL per pt   2. HTN- controlled on Hyzaar 50-12.5 mg daily    3. BPH- stable on Flomax 0.4 mg daily       Followed by Urology    4. Anal fissure- stable on topical meds, followed by GI   5. HLD- pt advised on proper diet       Lipids in 3 months    6. F/u in 1 yr or PRN

## 2018-08-30 ENCOUNTER — OFFICE VISIT (OUTPATIENT)
Dept: OPTOMETRY | Facility: CLINIC | Age: 58
End: 2018-08-30
Payer: COMMERCIAL

## 2018-08-30 DIAGNOSIS — H04.123 DRY EYES, BILATERAL: Primary | ICD-10-CM

## 2018-08-30 PROCEDURE — 92012 INTRM OPH EXAM EST PATIENT: CPT | Mod: S$GLB,,, | Performed by: OPTOMETRIST

## 2018-08-30 PROCEDURE — 99999 PR PBB SHADOW E&M-EST. PATIENT-LVL II: CPT | Mod: PBBFAC,,, | Performed by: OPTOMETRIST

## 2018-08-30 RX ORDER — TOBRAMYCIN AND DEXAMETHASONE 3; 1 MG/ML; MG/ML
1 SUSPENSION/ DROPS OPHTHALMIC 4 TIMES DAILY
Qty: 5 ML | Refills: 0 | Status: SHIPPED | OUTPATIENT
Start: 2018-08-30 | End: 2018-09-09

## 2018-08-30 NOTE — PROGRESS NOTES
ROSITA GONZALEZ 11/2017 with Dr. Blake Has gritty feeling in eyes, told he has   ocular rosacea.  Thinks he's having a flare up and needs antibiotic drops.   Symptoms seem to have worsened over the past month, feels like twice a   week he's having a flare up.  Using Systane more frequently up to 5 times   a day, had been using only twice a day.    Last edited by Monica Gutierrez on 8/30/2018  8:15 AM. (History)            Assessment /Plan     For exam results, see Encounter Report.    Dry eyes, bilateral  -     tobramycin-dexamethasone 0.3-0.1% (TOBRADEX) 0.3-0.1 % DrpS; Place 1 drop into both eyes 4 (four) times daily. for 10 days  Dispense: 5 mL; Refill: 0      1. Start Tobradex 4x/day x 10 days, if better can restart Systane Balance 4x/day. RTC if no better after 10 days.

## 2018-11-07 ENCOUNTER — LAB VISIT (OUTPATIENT)
Dept: LAB | Facility: HOSPITAL | Age: 58
End: 2018-11-07
Attending: INTERNAL MEDICINE
Payer: COMMERCIAL

## 2018-11-07 DIAGNOSIS — N40.1 BPH WITH URINARY OBSTRUCTION: ICD-10-CM

## 2018-11-07 DIAGNOSIS — N13.8 BPH WITH URINARY OBSTRUCTION: ICD-10-CM

## 2018-11-07 LAB — COMPLEXED PSA SERPL-MCNC: 3 NG/ML

## 2018-11-07 PROCEDURE — 84153 ASSAY OF PSA TOTAL: CPT

## 2018-11-07 PROCEDURE — 36415 COLL VENOUS BLD VENIPUNCTURE: CPT | Mod: PO

## 2018-11-21 ENCOUNTER — OFFICE VISIT (OUTPATIENT)
Dept: UROLOGY | Facility: CLINIC | Age: 58
End: 2018-11-21
Payer: COMMERCIAL

## 2018-11-21 VITALS
HEART RATE: 72 BPM | HEIGHT: 67 IN | DIASTOLIC BLOOD PRESSURE: 88 MMHG | BODY MASS INDEX: 25.9 KG/M2 | RESPIRATION RATE: 15 BRPM | SYSTOLIC BLOOD PRESSURE: 138 MMHG | WEIGHT: 165 LBS

## 2018-11-21 DIAGNOSIS — N13.8 BPH WITH URINARY OBSTRUCTION: Primary | ICD-10-CM

## 2018-11-21 DIAGNOSIS — N40.1 BPH WITH URINARY OBSTRUCTION: Primary | ICD-10-CM

## 2018-11-21 LAB
BILIRUB SERPL-MCNC: NORMAL MG/DL
BLOOD URINE, POC: NORMAL
COLOR, POC UA: YELLOW
GLUCOSE UR QL STRIP: NORMAL
KETONES UR QL STRIP: NORMAL
LEUKOCYTE ESTERASE URINE, POC: NORMAL
NITRITE, POC UA: NORMAL
PH, POC UA: 5
PROTEIN, POC: NORMAL
SPECIFIC GRAVITY, POC UA: 1.02
UROBILINOGEN, POC UA: NORMAL

## 2018-11-21 PROCEDURE — 3079F DIAST BP 80-89 MM HG: CPT | Mod: CPTII,S$GLB,, | Performed by: NURSE PRACTITIONER

## 2018-11-21 PROCEDURE — 99214 OFFICE O/P EST MOD 30 MIN: CPT | Mod: 25,S$GLB,, | Performed by: NURSE PRACTITIONER

## 2018-11-21 PROCEDURE — 81002 URINALYSIS NONAUTO W/O SCOPE: CPT | Mod: S$GLB,,, | Performed by: NURSE PRACTITIONER

## 2018-11-21 PROCEDURE — 3075F SYST BP GE 130 - 139MM HG: CPT | Mod: CPTII,S$GLB,, | Performed by: NURSE PRACTITIONER

## 2018-11-21 PROCEDURE — 3008F BODY MASS INDEX DOCD: CPT | Mod: CPTII,S$GLB,, | Performed by: NURSE PRACTITIONER

## 2018-11-21 PROCEDURE — 99999 PR PBB SHADOW E&M-EST. PATIENT-LVL III: CPT | Mod: PBBFAC,,, | Performed by: NURSE PRACTITIONER

## 2018-11-21 NOTE — PROGRESS NOTES
Subjective:       Patient ID: Nilton Chin is a 58 y.o. male.    Chief Complaint: BPH with urinary obstruction (yearly f/u)    Nilton Chin is a 57 y.o. Male with history of BPH.  He takes Flomax for his LUTS.  (-) family history for Prostate Cancer.  He was seen in past with Dr. Lopez 6/17/2015 for BPH and male pelvic pain    Last clinic visit was 11/08/2017  Today he reports no new complaints.  FOS is good; nocturia 1-2x depending on fluid intake.  PRN Mobic 15mg.    No issues with ED.                                      PSA                  3.0                 11/07/2018                 PSA                      3.5                 08/08/2018         PSA                  2.8                 11/01/2017                 PSA                      3.3                 08/08/2017        PSA                  2.3                 10/26/2016                 PSA                      2.3                 06/02/2016        PSA                  2.2                 09/17/2015                 PSA                  1.9                 09/17/2014                 PSA                  1.2                 09/04/2013               PSA                      0.80                11/05/2012                 PSA                      2.26                09/27/2012                 PSA                      1.1                 07/15/2011                 PSA                      0.95                10/29/2009                 PSA                      0.74                10/08/2008                             Past Medical History:    Allergy                                                       BPH (benign prostatic hypertrophy)                            History of prostatitis                                        Hypertension                                                  History reviewed. No pertinent surgical history.    Review of patient's family history indicates:    Cataracts                      Mother                    Hypertension                    Mother                    Cataracts                      Father                    Glaucoma                       Father                    Hypertension                   Father                    Blindness                      Other                     Cancer                         Paternal Grandfather        Comment: lung/prostate    Diabetes                       Paternal Grandfather      Diabetes                       Maternal Grandmother      Heart disease                  Maternal Grandmother      Amblyopia                      Neg Hx                    Strabismus                     Neg Hx                    Retinal detachment             Neg Hx                    Macular degeneration           Neg Hx                    Stroke                         Neg Hx                      Social History    Marital status:              Spouse name:                       Years of education:                 Number of children: 3             Occupational History  Occupation          Employer            Comment                                             Social History Main Topics    Smoking status: Former Smoker                                                                Packs/day: 0.00      Years: 35.00          Types: Cigarettes       Start date: 6/24/2013    Smokeless status: Never Used                        Comment: 6 cig a day    Alcohol use: Yes                Comment: socially/ not weekly    Drug use: No              Sexual activity: Yes               Partners with: Female    Other Topics            Concern    None on file    Social History Narrative    None on file        Allergies:  Codeine and Vicodin (hydrocodone-acetaminophen)    Medications:  Current Outpatient Prescriptions:   losartan-hydrochlorothiazide 50-12.5 mg (HYZAAR) 50-12.5 mg per tablet, Take 1 tablet by mouth once daily., Disp: 90 tablet, Rfl: 3  tamsulosin (FLOMAX) 0.4 mg Cp24, TAKE ONE CAPSULE BY MOUTH  ONCE DAILY., Disp: 90 capsule, Rfl: 0          Review of Systems   Constitutional: Negative for activity change, appetite change, chills and fever.   HENT: Negative for facial swelling and trouble swallowing.    Eyes: Negative for visual disturbance.   Respiratory: Negative for chest tightness and shortness of breath.    Cardiovascular: Negative for chest pain and palpitations.   Gastrointestinal: Negative.  Negative for abdominal pain, constipation, diarrhea, nausea and vomiting.   Genitourinary: Positive for nocturia. Negative for difficulty urinating, dysuria, flank pain, hematuria, penile pain, penile swelling, scrotal swelling and testicular pain.        Pleased with urination.     Musculoskeletal: Negative for back pain, gait problem, myalgias and neck stiffness.   Skin: Negative for rash.   Neurological: Negative for dizziness and speech difficulty.   Hematological: Does not bruise/bleed easily.   Psychiatric/Behavioral: Negative for behavioral problems.       Objective:      Physical Exam   Nursing note and vitals reviewed.  Constitutional: He is oriented to person, place, and time. Vital signs are normal. He appears well-developed and well-nourished. He is active and cooperative.  Non-toxic appearance. He does not have a sickly appearance.   Urine dipped clear of infection in the office today.     HENT:   Head: Normocephalic and atraumatic.   Right Ear: External ear normal.   Left Ear: External ear normal.   Nose: Nose normal.   Mouth/Throat: Mucous membranes are normal.   Eyes: Conjunctivae and lids are normal. No scleral icterus.   Neck: Trachea normal, normal range of motion and full passive range of motion without pain. Neck supple. No JVD present. No tracheal deviation present.   Cardiovascular: Normal rate, S1 normal and S2 normal.    Pulmonary/Chest: Effort normal. No respiratory distress. He exhibits no tenderness.   Abdominal: Soft. Normal appearance and bowel sounds are normal. There is no  hepatosplenomegaly. There is no tenderness. There is no CVA tenderness.   Genitourinary: Rectum normal, testes normal and penis normal. Prostate is enlarged. Prostate is not tender. Right testis shows no swelling and no tenderness. Left testis shows no swelling and no tenderness. Circumcised. No hypospadias, penile erythema or penile tenderness. No discharge found.       Musculoskeletal: Normal range of motion.   Lymphadenopathy: No inguinal adenopathy noted on the right or left side.   Neurological: He is alert and oriented to person, place, and time. He has normal strength.   Skin: Skin is warm, dry and intact.     Psychiatric: He has a normal mood and affect. His behavior is normal. Judgment and thought content normal.       Assessment:       1. BPH with urinary obstruction        Plan:         I spent 25 minutes with the patient of which more than half was spent in direct consultation with the patient in regards to our treatment and plan.    Education and recommendations of today's plan of care including home remedies.  We discussed his BPH and LUTS; medical management.  Continue Flomax daily.  Discussed his PSA results and expectations; discussed the changes and contributory factors.  RTC one year.

## 2018-11-28 ENCOUNTER — LAB VISIT (OUTPATIENT)
Dept: LAB | Facility: HOSPITAL | Age: 58
End: 2018-11-28
Attending: INTERNAL MEDICINE
Payer: COMMERCIAL

## 2018-11-28 DIAGNOSIS — E78.00 HYPERCHOLESTEREMIA: ICD-10-CM

## 2018-11-28 LAB
CHOLEST SERPL-MCNC: 228 MG/DL
CHOLEST/HDLC SERPL: 4.7 {RATIO}
HDLC SERPL-MCNC: 49 MG/DL
HDLC SERPL: 21.5 %
LDLC SERPL CALC-MCNC: 146.4 MG/DL
NONHDLC SERPL-MCNC: 179 MG/DL
TRIGL SERPL-MCNC: 163 MG/DL

## 2018-11-28 PROCEDURE — 80061 LIPID PANEL: CPT

## 2018-11-28 PROCEDURE — 36415 COLL VENOUS BLD VENIPUNCTURE: CPT | Mod: PO

## 2018-12-01 DIAGNOSIS — N40.1 BPH WITH URINARY OBSTRUCTION: ICD-10-CM

## 2018-12-01 DIAGNOSIS — N13.8 BPH WITH URINARY OBSTRUCTION: ICD-10-CM

## 2018-12-02 DIAGNOSIS — I10 ESSENTIAL HYPERTENSION: ICD-10-CM

## 2018-12-03 RX ORDER — TAMSULOSIN HYDROCHLORIDE 0.4 MG/1
CAPSULE ORAL
Qty: 90 CAPSULE | Refills: 3 | Status: SHIPPED | OUTPATIENT
Start: 2018-12-03 | End: 2019-12-03 | Stop reason: ALTCHOICE

## 2018-12-03 RX ORDER — LOSARTAN POTASSIUM AND HYDROCHLOROTHIAZIDE 12.5; 5 MG/1; MG/1
TABLET ORAL
Qty: 90 TABLET | Refills: 3 | Status: SHIPPED | OUTPATIENT
Start: 2018-12-03 | End: 2019-12-09 | Stop reason: SDUPTHER

## 2018-12-04 ENCOUNTER — OFFICE VISIT (OUTPATIENT)
Dept: OPTOMETRY | Facility: CLINIC | Age: 58
End: 2018-12-04
Payer: COMMERCIAL

## 2018-12-04 DIAGNOSIS — H04.123 DRY EYES, BILATERAL: Primary | ICD-10-CM

## 2018-12-04 DIAGNOSIS — H52.4 PRESBYOPIA: ICD-10-CM

## 2018-12-04 DIAGNOSIS — Z83.511 FAMILY HISTORY OF GLAUCOMA: ICD-10-CM

## 2018-12-04 PROCEDURE — 92015 DETERMINE REFRACTIVE STATE: CPT | Mod: S$GLB,,, | Performed by: OPTOMETRIST

## 2018-12-04 PROCEDURE — 99999 PR PBB SHADOW E&M-EST. PATIENT-LVL II: CPT | Mod: PBBFAC,,, | Performed by: OPTOMETRIST

## 2018-12-04 PROCEDURE — 92014 COMPRE OPH EXAM EST PT 1/>: CPT | Mod: S$GLB,,, | Performed by: OPTOMETRIST

## 2018-12-04 NOTE — PROGRESS NOTES
HPI     DLS:8/30/2018  Pt states [facility] has no changes. Wears OTC readers for reading and computers    +2.50. Having some rosacea in the corner of left eye.   No f/ f  systane bal   Hx of rosacea     Last edited by Dona Henson MA on 12/4/2018  8:24 AM. (History)        ROS     Positive for: Skin (rosacea), Eyes (ocular rosacea hx)    Negative for: Constitutional, Gastrointestinal, Neurological,   Genitourinary, Musculoskeletal, HENT, Endocrine, Cardiovascular,   Respiratory, Psychiatric, Allergic/Imm, Heme/Lymph    Last edited by Arnulfo Blake, OD on 12/4/2018  8:32 AM. (History)        Assessment /Plan     For exam results, see Encounter Report.    Dry eyes, bilateral    Family history of glaucoma    Presbyopia          1. hyp/Presby--pt happy w otc readers  2. Family History of Glaucoma.  Advised yearly exam   3. Ocular rosacea hx--see prev notes.  Pt doing well on otc SYSTANE BAL ATs QID for now      PLAN:    rtc 1 yr

## 2019-02-01 ENCOUNTER — OFFICE VISIT (OUTPATIENT)
Dept: INTERNAL MEDICINE | Facility: CLINIC | Age: 59
End: 2019-02-01
Payer: COMMERCIAL

## 2019-02-01 VITALS
HEART RATE: 78 BPM | BODY MASS INDEX: 26.12 KG/M2 | HEIGHT: 67 IN | RESPIRATION RATE: 18 BRPM | SYSTOLIC BLOOD PRESSURE: 126 MMHG | TEMPERATURE: 98 F | WEIGHT: 166.44 LBS | DIASTOLIC BLOOD PRESSURE: 86 MMHG

## 2019-02-01 DIAGNOSIS — J32.9 VIRAL SINUSITIS: Primary | ICD-10-CM

## 2019-02-01 DIAGNOSIS — I10 ESSENTIAL HYPERTENSION: Chronic | ICD-10-CM

## 2019-02-01 DIAGNOSIS — R51.9 SINUS HEADACHE: ICD-10-CM

## 2019-02-01 DIAGNOSIS — B97.89 VIRAL SINUSITIS: Primary | ICD-10-CM

## 2019-02-01 PROCEDURE — 3008F BODY MASS INDEX DOCD: CPT | Mod: CPTII,S$GLB,, | Performed by: INTERNAL MEDICINE

## 2019-02-01 PROCEDURE — 99214 OFFICE O/P EST MOD 30 MIN: CPT | Mod: S$GLB,,, | Performed by: INTERNAL MEDICINE

## 2019-02-01 PROCEDURE — 99999 PR PBB SHADOW E&M-EST. PATIENT-LVL III: ICD-10-PCS | Mod: PBBFAC,,, | Performed by: INTERNAL MEDICINE

## 2019-02-01 PROCEDURE — 3079F DIAST BP 80-89 MM HG: CPT | Mod: CPTII,S$GLB,, | Performed by: INTERNAL MEDICINE

## 2019-02-01 PROCEDURE — 3008F PR BODY MASS INDEX (BMI) DOCUMENTED: ICD-10-PCS | Mod: CPTII,S$GLB,, | Performed by: INTERNAL MEDICINE

## 2019-02-01 PROCEDURE — 99999 PR PBB SHADOW E&M-EST. PATIENT-LVL III: CPT | Mod: PBBFAC,,, | Performed by: INTERNAL MEDICINE

## 2019-02-01 PROCEDURE — 99214 PR OFFICE/OUTPT VISIT, EST, LEVL IV, 30-39 MIN: ICD-10-PCS | Mod: S$GLB,,, | Performed by: INTERNAL MEDICINE

## 2019-02-01 PROCEDURE — 3079F PR MOST RECENT DIASTOLIC BLOOD PRESSURE 80-89 MM HG: ICD-10-PCS | Mod: CPTII,S$GLB,, | Performed by: INTERNAL MEDICINE

## 2019-02-01 PROCEDURE — 3074F SYST BP LT 130 MM HG: CPT | Mod: CPTII,S$GLB,, | Performed by: INTERNAL MEDICINE

## 2019-02-01 PROCEDURE — 3074F PR MOST RECENT SYSTOLIC BLOOD PRESSURE < 130 MM HG: ICD-10-PCS | Mod: CPTII,S$GLB,, | Performed by: INTERNAL MEDICINE

## 2019-02-01 RX ORDER — METHYLPREDNISOLONE 4 MG/1
TABLET ORAL
Qty: 1 PACKAGE | Refills: 0 | Status: SHIPPED | OUTPATIENT
Start: 2019-02-01 | End: 2019-08-21

## 2019-02-01 RX ORDER — FLUTICASONE PROPIONATE 50 MCG
2 SPRAY, SUSPENSION (ML) NASAL DAILY
Qty: 16 G | Refills: 12 | Status: SHIPPED | OUTPATIENT
Start: 2019-02-01 | End: 2019-03-03

## 2019-02-01 RX ORDER — LEVOCETIRIZINE DIHYDROCHLORIDE 5 MG/1
5 TABLET, FILM COATED ORAL NIGHTLY
Qty: 30 TABLET | Refills: 11 | Status: SHIPPED | OUTPATIENT
Start: 2019-02-01 | End: 2019-08-21

## 2019-02-01 NOTE — PROGRESS NOTES
Subjective:       Patient ID: Nilton Chin is a 58 y.o. male.    Chief Complaint: Sinus Problem and Nasal Congestion    HPI   Pt with HTN is c/o 2 wks of persistent sinus congestion, post nasal drip, sinus headache, runny nose, itchy/watery eyes. Minimal relief with Nyquil/Dayquil.   Review of Systems   Constitutional: Negative for activity change, appetite change, chills, diaphoresis, fatigue, fever and unexpected weight change.   HENT: Positive for congestion, postnasal drip, rhinorrhea, sinus pressure, sinus pain and sore throat. Negative for sneezing, trouble swallowing and voice change.    Respiratory: Negative for cough, shortness of breath and wheezing.    Cardiovascular: Negative for chest pain, palpitations and leg swelling.   Gastrointestinal: Negative for abdominal pain, blood in stool, constipation, diarrhea, nausea and vomiting.   Genitourinary: Negative for dysuria.   Musculoskeletal: Negative for arthralgias and myalgias.   Skin: Negative for rash and wound.   Allergic/Immunologic: Negative for environmental allergies and food allergies.   Hematological: Negative for adenopathy. Does not bruise/bleed easily.       Objective:      Physical Exam   Constitutional: He is oriented to person, place, and time. He appears well-developed and well-nourished. No distress.   HENT:   Head: Normocephalic and atraumatic.   Right Ear: External ear normal.   Left Ear: External ear normal.   Nose: Mucosal edema and rhinorrhea present.   Mouth/Throat: Oropharynx is clear and moist. No oropharyngeal exudate.   Eyes: Conjunctivae and EOM are normal. Pupils are equal, round, and reactive to light. Right eye exhibits no discharge. Left eye exhibits no discharge. No scleral icterus.   Neck: Normal range of motion. Neck supple. No JVD present.   Cardiovascular: Normal rate, regular rhythm and normal heart sounds.   No murmur heard.  Pulmonary/Chest: Effort normal and breath sounds normal. No respiratory distress. He has no  wheezes. He has no rales.   Musculoskeletal: He exhibits no edema.   Lymphadenopathy:     He has no cervical adenopathy.   Neurological: He is alert and oriented to person, place, and time.   Skin: Skin is warm and dry. No rash noted. He is not diaphoretic. No pallor.       Assessment:       1. Viral sinusitis    2. Sinus headache    3. Essential hypertension        Plan:    1. Rx Xyzal/Flonase/Medrol Pack        No decongestants 2/2 HTN   2. NSAIDs PRN   3. Stable, CPT

## 2019-07-18 ENCOUNTER — TELEPHONE (OUTPATIENT)
Dept: INTERNAL MEDICINE | Facility: CLINIC | Age: 59
End: 2019-07-18

## 2019-07-18 DIAGNOSIS — Z00.00 ANNUAL PHYSICAL EXAM: Primary | ICD-10-CM

## 2019-07-18 NOTE — TELEPHONE ENCOUNTER
----- Message from Eduardo Lorenzo sent at 7/18/2019  1:49 PM CDT -----  Contact: Pt  Pt would like to be called back regarding needing lab orders in for yearly visit.    Pt can be reached at [phone].    Thank You.

## 2019-08-14 ENCOUNTER — LAB VISIT (OUTPATIENT)
Dept: LAB | Facility: HOSPITAL | Age: 59
End: 2019-08-14
Attending: INTERNAL MEDICINE
Payer: COMMERCIAL

## 2019-08-14 DIAGNOSIS — N13.8 BPH WITH URINARY OBSTRUCTION: ICD-10-CM

## 2019-08-14 DIAGNOSIS — N40.1 BPH WITH URINARY OBSTRUCTION: ICD-10-CM

## 2019-08-14 DIAGNOSIS — Z00.00 ANNUAL PHYSICAL EXAM: ICD-10-CM

## 2019-08-14 LAB
ALBUMIN SERPL BCP-MCNC: 4.1 G/DL (ref 3.5–5.2)
ALP SERPL-CCNC: 74 U/L (ref 55–135)
ALT SERPL W/O P-5'-P-CCNC: 26 U/L (ref 10–44)
ANION GAP SERPL CALC-SCNC: 9 MMOL/L (ref 8–16)
AST SERPL-CCNC: 25 U/L (ref 10–40)
BASOPHILS # BLD AUTO: 0.02 K/UL (ref 0–0.2)
BASOPHILS NFR BLD: 0.4 % (ref 0–1.9)
BILIRUB SERPL-MCNC: 0.8 MG/DL (ref 0.1–1)
BUN SERPL-MCNC: 16 MG/DL (ref 6–20)
CALCIUM SERPL-MCNC: 9.6 MG/DL (ref 8.7–10.5)
CHLORIDE SERPL-SCNC: 105 MMOL/L (ref 95–110)
CHOLEST SERPL-MCNC: 199 MG/DL (ref 120–199)
CHOLEST/HDLC SERPL: 4.2 {RATIO} (ref 2–5)
CO2 SERPL-SCNC: 28 MMOL/L (ref 23–29)
COMPLEXED PSA SERPL-MCNC: 4.4 NG/ML (ref 0–4)
CREAT SERPL-MCNC: 1 MG/DL (ref 0.5–1.4)
DIFFERENTIAL METHOD: ABNORMAL
EOSINOPHIL # BLD AUTO: 0.1 K/UL (ref 0–0.5)
EOSINOPHIL NFR BLD: 1.6 % (ref 0–8)
ERYTHROCYTE [DISTWIDTH] IN BLOOD BY AUTOMATED COUNT: 12.1 % (ref 11.5–14.5)
EST. GFR  (AFRICAN AMERICAN): >60 ML/MIN/1.73 M^2
EST. GFR  (NON AFRICAN AMERICAN): >60 ML/MIN/1.73 M^2
GLUCOSE SERPL-MCNC: 97 MG/DL (ref 70–110)
HCT VFR BLD AUTO: 43.1 % (ref 40–54)
HDLC SERPL-MCNC: 47 MG/DL (ref 40–75)
HDLC SERPL: 23.6 % (ref 20–50)
HGB BLD-MCNC: 13.9 G/DL (ref 14–18)
IMM GRANULOCYTES # BLD AUTO: 0.02 K/UL (ref 0–0.04)
IMM GRANULOCYTES NFR BLD AUTO: 0.4 % (ref 0–0.5)
LDLC SERPL CALC-MCNC: 128.2 MG/DL (ref 63–159)
LYMPHOCYTES # BLD AUTO: 1.2 K/UL (ref 1–4.8)
LYMPHOCYTES NFR BLD: 23.6 % (ref 18–48)
MCH RBC QN AUTO: 31 PG (ref 27–31)
MCHC RBC AUTO-ENTMCNC: 32.3 G/DL (ref 32–36)
MCV RBC AUTO: 96 FL (ref 82–98)
MONOCYTES # BLD AUTO: 0.4 K/UL (ref 0.3–1)
MONOCYTES NFR BLD: 7.2 % (ref 4–15)
NEUTROPHILS # BLD AUTO: 3.3 K/UL (ref 1.8–7.7)
NEUTROPHILS NFR BLD: 66.8 % (ref 38–73)
NONHDLC SERPL-MCNC: 152 MG/DL
NRBC BLD-RTO: 0 /100 WBC
PLATELET # BLD AUTO: 185 K/UL (ref 150–350)
PMV BLD AUTO: 12.6 FL (ref 9.2–12.9)
POTASSIUM SERPL-SCNC: 4.7 MMOL/L (ref 3.5–5.1)
PROT SERPL-MCNC: 7.1 G/DL (ref 6–8.4)
RBC # BLD AUTO: 4.48 M/UL (ref 4.6–6.2)
SODIUM SERPL-SCNC: 142 MMOL/L (ref 136–145)
TRIGL SERPL-MCNC: 119 MG/DL (ref 30–150)
TSH SERPL DL<=0.005 MIU/L-ACNC: 2.89 UIU/ML (ref 0.4–4)
WBC # BLD AUTO: 4.87 K/UL (ref 3.9–12.7)

## 2019-08-14 PROCEDURE — 84443 ASSAY THYROID STIM HORMONE: CPT

## 2019-08-14 PROCEDURE — 80061 LIPID PANEL: CPT

## 2019-08-14 PROCEDURE — 84153 ASSAY OF PSA TOTAL: CPT

## 2019-08-14 PROCEDURE — 80053 COMPREHEN METABOLIC PANEL: CPT

## 2019-08-14 PROCEDURE — 84153 ASSAY OF PSA TOTAL: CPT | Mod: 91

## 2019-08-14 PROCEDURE — 85025 COMPLETE CBC W/AUTO DIFF WBC: CPT

## 2019-08-14 PROCEDURE — 36415 COLL VENOUS BLD VENIPUNCTURE: CPT | Mod: PO

## 2019-08-21 ENCOUNTER — OFFICE VISIT (OUTPATIENT)
Dept: INTERNAL MEDICINE | Facility: CLINIC | Age: 59
End: 2019-08-21
Payer: COMMERCIAL

## 2019-08-21 ENCOUNTER — HOSPITAL ENCOUNTER (OUTPATIENT)
Dept: RADIOLOGY | Facility: HOSPITAL | Age: 59
Discharge: HOME | End: 2019-08-21
Attending: INTERNAL MEDICINE
Payer: COMMERCIAL

## 2019-08-21 VITALS
HEIGHT: 67 IN | WEIGHT: 162.69 LBS | TEMPERATURE: 98 F | HEART RATE: 78 BPM | BODY MASS INDEX: 25.53 KG/M2 | SYSTOLIC BLOOD PRESSURE: 132 MMHG | DIASTOLIC BLOOD PRESSURE: 84 MMHG

## 2019-08-21 DIAGNOSIS — G89.29 CHRONIC LEFT SHOULDER PAIN: ICD-10-CM

## 2019-08-21 DIAGNOSIS — M25.512 CHRONIC LEFT SHOULDER PAIN: ICD-10-CM

## 2019-08-21 DIAGNOSIS — Z00.00 ANNUAL PHYSICAL EXAM: Primary | ICD-10-CM

## 2019-08-21 DIAGNOSIS — K60.2 ANAL FISSURE: ICD-10-CM

## 2019-08-21 DIAGNOSIS — N40.1 BPH WITH URINARY OBSTRUCTION: ICD-10-CM

## 2019-08-21 DIAGNOSIS — N13.8 BPH WITH URINARY OBSTRUCTION: ICD-10-CM

## 2019-08-21 DIAGNOSIS — R97.20 ELEVATED PSA: ICD-10-CM

## 2019-08-21 DIAGNOSIS — E78.49 OTHER HYPERLIPIDEMIA: ICD-10-CM

## 2019-08-21 DIAGNOSIS — I10 ESSENTIAL HYPERTENSION: Chronic | ICD-10-CM

## 2019-08-21 PROBLEM — E78.00 HYPERCHOLESTEREMIA: Status: RESOLVED | Noted: 2018-08-15 | Resolved: 2019-08-21

## 2019-08-21 PROCEDURE — 73030 X-RAY EXAM OF SHOULDER: CPT | Mod: TC,PO,LT

## 2019-08-21 PROCEDURE — 99999 PR PBB SHADOW E&M-EST. PATIENT-LVL III: CPT | Mod: PBBFAC,,, | Performed by: INTERNAL MEDICINE

## 2019-08-21 PROCEDURE — 3075F SYST BP GE 130 - 139MM HG: CPT | Mod: CPTII,S$GLB,, | Performed by: INTERNAL MEDICINE

## 2019-08-21 PROCEDURE — 99396 PR PREVENTIVE VISIT,EST,40-64: ICD-10-PCS | Mod: S$GLB,,, | Performed by: INTERNAL MEDICINE

## 2019-08-21 PROCEDURE — 99999 PR PBB SHADOW E&M-EST. PATIENT-LVL III: ICD-10-PCS | Mod: PBBFAC,,, | Performed by: INTERNAL MEDICINE

## 2019-08-21 PROCEDURE — 73030 X-RAY EXAM OF SHOULDER: CPT | Mod: 26,LT,, | Performed by: RADIOLOGY

## 2019-08-21 PROCEDURE — 3075F PR MOST RECENT SYSTOLIC BLOOD PRESS GE 130-139MM HG: ICD-10-PCS | Mod: CPTII,S$GLB,, | Performed by: INTERNAL MEDICINE

## 2019-08-21 PROCEDURE — 3079F DIAST BP 80-89 MM HG: CPT | Mod: CPTII,S$GLB,, | Performed by: INTERNAL MEDICINE

## 2019-08-21 PROCEDURE — 73030 XR SHOULDER COMPLETE 2 OR MORE VIEWS LEFT: ICD-10-PCS | Mod: 26,LT,, | Performed by: RADIOLOGY

## 2019-08-21 PROCEDURE — 99396 PREV VISIT EST AGE 40-64: CPT | Mod: S$GLB,,, | Performed by: INTERNAL MEDICINE

## 2019-08-21 PROCEDURE — 3079F PR MOST RECENT DIASTOLIC BLOOD PRESSURE 80-89 MM HG: ICD-10-PCS | Mod: CPTII,S$GLB,, | Performed by: INTERNAL MEDICINE

## 2019-08-21 NOTE — PROGRESS NOTES
Subjective:       Patient ID: Nilton Chin is a 59 y.o. male.    Chief Complaint: Annual Exam (review labs) and Shoulder Pain (left)    HPI   59 y.o. Male here for annual exam.      Vaccines: Influenza (declined); Tetanus (2015)  Eye exam: 2016  Colonoscopy: 2015- NL per pt     Exercise: walks 3x/wk  Diet: regular     Past Medical History:    Allergy                                                       BPH (benign prostatic hypertrophy)                            History of prostatitis                                        Hypertension                                                History reviewed. No pertinent surgical history.  Social History    Marital status:              Spouse name:                       Years of education:                 Number of children: 3              Occupational History  Occupation          Employer            Comment                                              Social History Main Topics    Smoking status: Former Smoker                                                                Packs/day: 0.00      Years: 35.00          Types: Cigarettes       Start date: 6/24/2013    Smokeless status: Never Used                        Comment: 6 cig a day    Alcohol use: Yes                Comment: socially/ not weekly    Drug use: No              Sexual activity: Yes               Partners with: Female      -- Codeine -- Nausea Only   -- Vicodin [Hydrocodone-Acetaminophen]     --  unknown  Review of Systems   Constitutional: Negative for activity change, appetite change, chills, diaphoresis, fatigue, fever and unexpected weight change.   HENT: Negative for congestion, mouth sores, postnasal drip, rhinorrhea, sinus pressure, sneezing, sore throat, trouble swallowing and voice change.    Eyes: Negative for discharge, itching and visual disturbance.   Respiratory: Negative for cough, chest tightness, shortness of breath and wheezing.    Cardiovascular: Negative for chest  pain, palpitations and leg swelling.   Gastrointestinal: Negative for abdominal pain, blood in stool, constipation, diarrhea, nausea and vomiting.   Endocrine: Negative for cold intolerance and heat intolerance.   Genitourinary: Negative for difficulty urinating, dysuria, flank pain, hematuria and urgency.   Musculoskeletal: Negative for arthralgias, back pain, myalgias and neck pain.   Skin: Negative for rash and wound.   Allergic/Immunologic: Negative for environmental allergies and food allergies.   Neurological: Negative for dizziness, tremors, seizures, syncope, weakness and headaches.   Hematological: Negative for adenopathy. Does not bruise/bleed easily.   Psychiatric/Behavioral: Negative for confusion, sleep disturbance and suicidal ideas. The patient is not nervous/anxious.        Objective:      Physical Exam   Constitutional: He is oriented to person, place, and time. He appears well-developed and well-nourished. No distress.   HENT:   Head: Normocephalic and atraumatic.   Right Ear: External ear normal.   Left Ear: External ear normal.   Nose: Nose normal.   Mouth/Throat: Oropharynx is clear and moist. No oropharyngeal exudate.   Eyes: Pupils are equal, round, and reactive to light. Conjunctivae and EOM are normal. Right eye exhibits no discharge. Left eye exhibits no discharge. No scleral icterus.   Neck: Normal range of motion. Neck supple. No JVD present. No thyromegaly present.   Cardiovascular: Normal rate, regular rhythm, normal heart sounds and intact distal pulses.   No murmur heard.  Pulmonary/Chest: Effort normal and breath sounds normal. No respiratory distress. He has no wheezes. He has no rales.   Abdominal: Soft. Bowel sounds are normal. He exhibits no distension. There is no tenderness. There is no guarding.   Musculoskeletal: He exhibits no edema.   Lymphadenopathy:     He has no cervical adenopathy.   Neurological: He is alert and oriented to person, place, and time. No cranial nerve  deficit. Coordination normal.   Skin: Skin is warm and dry. No rash noted. He is not diaphoretic. No pallor.   Psychiatric: He has a normal mood and affect. Judgment normal.       Assessment:       1. Annual physical exam    2. Essential hypertension    3. BPH with urinary obstruction    4. Anal fissure    5. Other hyperlipidemia    6. Elevated PSA    7. Chronic left shoulder pain        Plan:    1. Blood work reviewed with pt       Vaccines: Influenza (declined); Tetanus (2015)       Eye exam: 2016       Colonoscopy: 2015- NL per pt   2. HTN- controlled on Hyzaar 50-12.5 mg daily    3. BPH- stable on Flomax 0.4 mg daily       Followed by Urology    4. Anal fissure- stable on topical meds, followed by GI   5. HLD- diet controlled   6. Elevated PSA- referral back to Urology   7. Chronic left shoulder pain- X-rays and referral to Ortho   8. F/u in 1 yr or PRN

## 2019-08-22 ENCOUNTER — TELEPHONE (OUTPATIENT)
Dept: INTERNAL MEDICINE | Facility: CLINIC | Age: 59
End: 2019-08-22

## 2019-08-22 NOTE — TELEPHONE ENCOUNTER
----- Message from Ofelia Mckeon sent at 8/22/2019  9:06 AM CDT -----  Contact: self/ [phone]  Patient is faxing over an insurance paper and will  tomorrow morning, please call to confirm.

## 2019-08-23 ENCOUNTER — OFFICE VISIT (OUTPATIENT)
Dept: SPORTS MEDICINE | Facility: CLINIC | Age: 59
End: 2019-08-23
Payer: COMMERCIAL

## 2019-08-23 VITALS
WEIGHT: 162 LBS | SYSTOLIC BLOOD PRESSURE: 125 MMHG | BODY MASS INDEX: 25.43 KG/M2 | DIASTOLIC BLOOD PRESSURE: 86 MMHG | HEART RATE: 82 BPM | HEIGHT: 67 IN

## 2019-08-23 DIAGNOSIS — M77.8 LEFT SHOULDER TENDONITIS: Primary | ICD-10-CM

## 2019-08-23 PROCEDURE — 3074F SYST BP LT 130 MM HG: CPT | Mod: CPTII,S$GLB,, | Performed by: ORTHOPAEDIC SURGERY

## 2019-08-23 PROCEDURE — 3079F PR MOST RECENT DIASTOLIC BLOOD PRESSURE 80-89 MM HG: ICD-10-PCS | Mod: CPTII,S$GLB,, | Performed by: ORTHOPAEDIC SURGERY

## 2019-08-23 PROCEDURE — 20610 LARGE JOINT ASPIRATION/INJECTION: L SUBACROMIAL BURSA: ICD-10-PCS | Mod: LT,S$GLB,, | Performed by: ORTHOPAEDIC SURGERY

## 2019-08-23 PROCEDURE — 3074F PR MOST RECENT SYSTOLIC BLOOD PRESSURE < 130 MM HG: ICD-10-PCS | Mod: CPTII,S$GLB,, | Performed by: ORTHOPAEDIC SURGERY

## 2019-08-23 PROCEDURE — 99999 PR PBB SHADOW E&M-EST. PATIENT-LVL III: ICD-10-PCS | Mod: PBBFAC,,, | Performed by: ORTHOPAEDIC SURGERY

## 2019-08-23 PROCEDURE — 20610 DRAIN/INJ JOINT/BURSA W/O US: CPT | Mod: LT,S$GLB,, | Performed by: ORTHOPAEDIC SURGERY

## 2019-08-23 PROCEDURE — 3008F PR BODY MASS INDEX (BMI) DOCUMENTED: ICD-10-PCS | Mod: CPTII,S$GLB,, | Performed by: ORTHOPAEDIC SURGERY

## 2019-08-23 PROCEDURE — 99214 OFFICE O/P EST MOD 30 MIN: CPT | Mod: 25,S$GLB,, | Performed by: ORTHOPAEDIC SURGERY

## 2019-08-23 PROCEDURE — 99999 PR PBB SHADOW E&M-EST. PATIENT-LVL III: CPT | Mod: PBBFAC,,, | Performed by: ORTHOPAEDIC SURGERY

## 2019-08-23 PROCEDURE — 3079F DIAST BP 80-89 MM HG: CPT | Mod: CPTII,S$GLB,, | Performed by: ORTHOPAEDIC SURGERY

## 2019-08-23 PROCEDURE — 3008F BODY MASS INDEX DOCD: CPT | Mod: CPTII,S$GLB,, | Performed by: ORTHOPAEDIC SURGERY

## 2019-08-23 PROCEDURE — 99214 PR OFFICE/OUTPT VISIT, EST, LEVL IV, 30-39 MIN: ICD-10-PCS | Mod: 25,S$GLB,, | Performed by: ORTHOPAEDIC SURGERY

## 2019-08-23 RX ORDER — TRIAMCINOLONE ACETONIDE 40 MG/ML
40 INJECTION, SUSPENSION INTRA-ARTICULAR; INTRAMUSCULAR
Status: DISCONTINUED | OUTPATIENT
Start: 2019-08-23 | End: 2019-08-23 | Stop reason: HOSPADM

## 2019-08-23 RX ADMIN — TRIAMCINOLONE ACETONIDE 40 MG: 40 INJECTION, SUSPENSION INTRA-ARTICULAR; INTRAMUSCULAR at 12:08

## 2019-08-23 NOTE — PROGRESS NOTES
CC: LEFT shoulder pain    Nilton Chin is a 59 y.o. male who presents with intermittent left shoulder pain x 5 months. Pain is localized to the lateral and posterior shoulder with mild associated weakness.  He denies injury. Pain worse at night and when he sleeps on his left side. Denies difficulty with overhead activities. He has taken Aleve with some improvement in the pain. Pain is not affecting his ADLs.     Past Medical History:   Diagnosis Date    Allergy     BPH (benign prostatic hypertrophy)     History of prostatitis     Hypertension        No past surgical history on file.    Family History   Problem Relation Age of Onset    Cataracts Mother     Hypertension Mother     Cataracts Father     Glaucoma Father     Hypertension Father     Blindness Other     Cancer Paternal Grandfather         lung/prostate    Diabetes Paternal Grandfather     Diabetes Maternal Grandmother     Heart disease Maternal Grandmother     Amblyopia Neg Hx     Strabismus Neg Hx     Retinal detachment Neg Hx     Macular degeneration Neg Hx     Stroke Neg Hx     Melanoma Neg Hx          Current Outpatient Medications:     losartan-hydrochlorothiazide 50-12.5 mg (HYZAAR) 50-12.5 mg per tablet, TAKE 1 TABLET BY MOUTH EVERY DAY, Disp: 90 tablet, Rfl: 3    tamsulosin (FLOMAX) 0.4 mg Cap, TAKE 1 CAPSULE BY MOUTH EVERY DAY, Disp: 90 capsule, Rfl: 3    Review of patient's allergies indicates:   Allergen Reactions    Codeine Nausea Only    Vicodin [hydrocodone-acetaminophen]      unknown          REVIEW OF SYSTEMS:  Constitution: Negative. Negative for chills, fever and night sweats.   HENT: Negative for congestion and headaches.    Eyes: Negative for blurred vision, left vision loss and right vision loss.   Cardiovascular: Negative for chest pain and syncope.   Respiratory: Negative for cough and shortness of breath.    Endocrine: Negative for polydipsia, polyphagia and polyuria.   Hematologic/Lymphatic: Negative for  "bleeding problem. Does not bruise/bleed easily.   Skin: Negative for dry skin, itching and rash.   Musculoskeletal: Negative for falls.  Positive for left shoulder pain and muscle weakness.   Gastrointestinal: Negative for abdominal pain and bowel incontinence.   Genitourinary: Negative for bladder incontinence and nocturia.   Neurological: Negative for disturbances in coordination, loss of balance and seizures.   Psychiatric/Behavioral: Negative for depression. The patient does not have insomnia.    Allergic/Immunologic: Negative for hives and persistent infections.      PHYSICAL EXAMINATION:  Vitals:  /86   Pulse 82   Ht 5' 7" (1.702 m)   Wt 73.5 kg (162 lb)   BMI 25.37 kg/m²    General: The patient is alert and oriented x 3.  Mood is pleasant.  Observation of ears, eyes and nose reveal no gross abnormalities.  No labored breathing observed.  Gait is coordinated. Patient can toe walk and heel walk without difficulty.      LEFT Shoulder / Upper Extremity Exam    OBSERVATION:     Swelling  none  Deformity  none   Discoloration  none   Scapular winging none   Scars   none  Atrophy  none    TENDERNESS / CREPITUS (T/C):          T/C      T/C   Clavicle   -/-  SUPRAspinatus    -/-     AC Jt.    -/-  INFRAspinatus  -/-    SC Jt.    -/-  Deltoid    -/-      G. Tuberosity  -/-  LH BICEP groove  -/-   Acromion:  -/-  Midline Neck   -/-     Scapular Spine -/-  Trapezium   -/-   SMA Scapula  -/-  GH jt. line - post  -/-     Scapulothoracic  -/-         ROM: (* = with pain)  Right shoulder   Left shoulder        AROM (PROM)   AROM (PROM)   FE    170° (175°)     170° (175°)     ER at 0°    60°  (65°)    60°  (65°)   ER at 90° ABD  90°  (90°)    90°  (90°)   IR at 90°  ABD   NA  (40°)     NA  (40°)      IR (spine level)   T10     T11    STRENGTH: (* = with pain) Right shoulder   Left shoulder    SCAPTION   5/5    5/5    IR    5/5    5/5   ER    5/5    5/5   BICEPS   5/5    5/5   Deltoid    5/5    5/5     SIGNS:  " Painful side       NEER   -    OZARINAS  neg    HO   +    SPEEDS  neg     DROP ARM   -   BELLY PRESS neg   Superior escape none    LIFT-OFF  neg   X-Body ADD    neg    MOVING VALGUS neg        STABILITY TESTING    Right shoulder   Left shoulder    Translation     Anterior  up face     up face    Posterior  up face    up face    Sulcus   < 10mm    < 10 mm     Signs   Apprehension   neg      neg       Relocation   no change     no change      Jerk test  neg     neg    EXTREMITY NEURO-VASCULAR EXAM:    Sensation grossly intact to light touch all dermatomal regions.    DTR 2+ Biceps, Triceps, BR and Negative Elvins sign   Grossly intact motor function at Elbow, Wrist and Hand   Distal pulses radial and ulnar 2+, brisk cap refill, symmetric.      NECK:  Painless FROM and spinous processes non-tender. Negative Spurlings sign.      OTHER FINDINGS:  no scapular dyskinesia    XRAYS:  Xrays including AP, Outlet and Axillary Lateral of Left shoulder are ordered / images reviewed by me:   No fracture dislocation or other pathology   Proximal migration of humeral head: None   GH arthritis: None          ASSESSMENT:   Left shoulder pain, possible RTC tendonitis    PLAN:      1. CSI L shoulder today in clinic  2. RTC in 2 months     All questions were answered, patient will contact us for questions or concerns in the interim.

## 2019-08-23 NOTE — LETTER
August 23, 2019      Arnulfo Hammonds, DO  [address] [place] [address]           [place] [address] [facility] [address]  Phone: [phone]          Patient: Nilton Chin   MR Number: [id number]   YOB: 1960   Date of Visit: 8/23/2019       Dear Dr. Arnulfo Hammonds:    Thank you for referring Nilton Chin to me for evaluation. Attached you will find relevant portions of my assessment and plan of care.    If you have questions, please do not hesitate to call me. I look forward to following Nilton Chin along with you.    Sincerely,    Malachi Montaño MD    Enclosure  CC:  No Recipients    If you would like to receive this communication electronically, please contact [email or web address] or [phone] to request more information on [employer] access.    For providers and/or their staff who would like to refer a patient to [facility], please contact us through our one-stop-shop provider referral line, [facility], at [phone].    If you feel you have received this communication in error or would no longer like to receive these types of communications, please e-mail [email or web address]

## 2019-08-23 NOTE — PROGRESS NOTES
Large Joint Aspiration/Injection: L subacromial bursa  Date/Time: 8/23/2019 12:55 PM  Performed by: Malachi Montaño MD  Authorized by: Malachi Montaño MD     Consent Done?:  Yes (Verbal)  Indications:  Pain  Procedure site marked: Yes    Timeout: Prior to procedure the correct patient, procedure, and site was verified      Location:  Shoulder  Site:  L subacromial bursa  Prep: Patient was prepped and draped in usual sterile fashion    Needle size:  22 G  Ultrasonic Guidance for needle placement: No  Approach:  Anterior  Medications:  40 mg triamcinolone acetonide 40 mg/mL

## 2019-08-26 NOTE — TELEPHONE ENCOUNTER
I told him wellness form is ready and just faxed off to .  Copy of form is at  for him to  in case needs to be resent.

## 2019-09-05 ENCOUNTER — OFFICE VISIT (OUTPATIENT)
Dept: UROLOGY | Facility: CLINIC | Age: 59
End: 2019-09-05
Payer: COMMERCIAL

## 2019-09-05 VITALS
HEART RATE: 84 BPM | WEIGHT: 158.75 LBS | BODY MASS INDEX: 24.92 KG/M2 | SYSTOLIC BLOOD PRESSURE: 136 MMHG | DIASTOLIC BLOOD PRESSURE: 92 MMHG | HEIGHT: 67 IN

## 2019-09-05 DIAGNOSIS — R97.20 ELEVATED PSA: Primary | ICD-10-CM

## 2019-09-05 DIAGNOSIS — N13.8 BPH WITH URINARY OBSTRUCTION: ICD-10-CM

## 2019-09-05 DIAGNOSIS — N40.1 BPH WITH URINARY OBSTRUCTION: ICD-10-CM

## 2019-09-05 DIAGNOSIS — N40.2 PROSTATE NODULE: ICD-10-CM

## 2019-09-05 PROCEDURE — 3075F PR MOST RECENT SYSTOLIC BLOOD PRESS GE 130-139MM HG: ICD-10-PCS | Mod: CPTII,S$GLB,, | Performed by: UROLOGY

## 2019-09-05 PROCEDURE — 3080F PR MOST RECENT DIASTOLIC BLOOD PRESSURE >= 90 MM HG: ICD-10-PCS | Mod: CPTII,S$GLB,, | Performed by: UROLOGY

## 2019-09-05 PROCEDURE — 99999 PR PBB SHADOW E&M-EST. PATIENT-LVL III: CPT | Mod: PBBFAC,,, | Performed by: UROLOGY

## 2019-09-05 PROCEDURE — 3075F SYST BP GE 130 - 139MM HG: CPT | Mod: CPTII,S$GLB,, | Performed by: UROLOGY

## 2019-09-05 PROCEDURE — 3008F BODY MASS INDEX DOCD: CPT | Mod: CPTII,S$GLB,, | Performed by: UROLOGY

## 2019-09-05 PROCEDURE — 3008F PR BODY MASS INDEX (BMI) DOCUMENTED: ICD-10-PCS | Mod: CPTII,S$GLB,, | Performed by: UROLOGY

## 2019-09-05 PROCEDURE — 99999 PR PBB SHADOW E&M-EST. PATIENT-LVL III: ICD-10-PCS | Mod: PBBFAC,,, | Performed by: UROLOGY

## 2019-09-05 PROCEDURE — 3080F DIAST BP >= 90 MM HG: CPT | Mod: CPTII,S$GLB,, | Performed by: UROLOGY

## 2019-09-05 PROCEDURE — 99214 PR OFFICE/OUTPT VISIT, EST, LEVL IV, 30-39 MIN: ICD-10-PCS | Mod: S$GLB,,, | Performed by: UROLOGY

## 2019-09-05 PROCEDURE — 99214 OFFICE O/P EST MOD 30 MIN: CPT | Mod: S$GLB,,, | Performed by: UROLOGY

## 2019-09-05 RX ORDER — LIDOCAINE HYDROCHLORIDE 20 MG/ML
JELLY TOPICAL ONCE
Status: CANCELLED | OUTPATIENT
Start: 2019-09-05 | End: 2019-09-05

## 2019-09-05 RX ORDER — LIDOCAINE HYDROCHLORIDE 10 MG/ML
10 INJECTION INFILTRATION; PERINEURAL ONCE
Status: CANCELLED | OUTPATIENT
Start: 2019-09-05 | End: 2019-09-05

## 2019-09-05 RX ORDER — GENTAMICIN SULFATE 40 MG/ML
2.5 INJECTION, SOLUTION INTRAMUSCULAR; INTRAVENOUS ONCE
Status: COMPLETED | OUTPATIENT
Start: 2019-09-05 | End: 2019-10-04

## 2019-09-05 NOTE — PROGRESS NOTES
CHIEF COMPLAINT:    Mr. Chin is a 59 y.o. male presenting with an elevated PSA.    PRESENTING ILLNESS:    Nilton Chin is a 59 y.o. male who had his PSA drawn by his PCP which returned elevated.  He's never had a biopsy.    He has nocturia x 1-2.  Slight decreased FOS.  No hematuria.  No dysuria.  He's on flomax and pleased with how he voids.    He denies ED.    REVIEW OF SYSTEMS:    Nilton Chin denies headache, blurred vision, fever, nausea, vomiting, chills, abdominal pain, bleeding per rectum, cough, SOB, recent loss of consciousness, recent mental status changes, seizures, dizziness, or upper or lower extremity weakness.    POLO  1. 5  2. 5  3. 5  4. 5  5. 5      PATIENT HISTORY:    Past Medical History:   Diagnosis Date    Allergy     BPH (benign prostatic hypertrophy)     History of prostatitis     Hypertension        History reviewed. No pertinent surgical history.    Family History   Problem Relation Age of Onset    Cataracts Mother     Hypertension Mother     Cataracts Father     Glaucoma Father     Hypertension Father     Blindness Other     Cancer Paternal Grandfather         lung/prostate    Diabetes Paternal Grandfather     Diabetes Maternal Grandmother     Heart disease Maternal Grandmother     Amblyopia Neg Hx     Strabismus Neg Hx     Retinal detachment Neg Hx     Macular degeneration Neg Hx     Stroke Neg Hx     Melanoma Neg Hx        Social History     Socioeconomic History    Marital status:      Spouse name: Not on file    Number of children: 3    Years of education: Not on file    Highest education level: Not on file   Occupational History    Occupation:    Social Needs    Financial resource strain: Not on file    Food insecurity:     Worry: Not on file     Inability: Not on file    Transportation needs:     Medical: Not on file     Non-medical: Not on file   Tobacco Use    Smoking status: Former Smoker     Years: 35.00     Types:  Cigarettes     Start date: 6/24/2013    Smokeless tobacco: Never Used    Tobacco comment: 6 cig a day   Substance and Sexual Activity    Alcohol use: Yes     Comment: socially/ not weekly    Drug use: No    Sexual activity: Yes     Partners: Female   Lifestyle    Physical activity:     Days per week: Not on file     Minutes per session: Not on file    Stress: Not on file   Relationships    Social connections:     Talks on phone: Not on file     Gets together: Not on file     Attends Scientologist service: Not on file     Active member of club or organization: Not on file     Attends meetings of clubs or organizations: Not on file     Relationship status: Not on file   Other Topics Concern    Not on file   Social History Narrative    Not on file       Allergies:  Codeine and Vicodin [hydrocodone-acetaminophen]    Medications:    Current Outpatient Medications:     losartan-hydrochlorothiazide 50-12.5 mg (HYZAAR) 50-12.5 mg per tablet, TAKE 1 TABLET BY MOUTH EVERY DAY, Disp: 90 tablet, Rfl: 3    tamsulosin (FLOMAX) 0.4 mg Cap, TAKE 1 CAPSULE BY MOUTH EVERY DAY, Disp: 90 capsule, Rfl: 3    PHYSICAL EXAMINATION:    The patient generally appears in good health, is appropriately interactive, and is in no apparent distress.     Eyes: anicteric sclerae, moist conjunctivae; no lid-lag; PERRLA     HENT: Atraumatic; oropharynx clear with moist mucous membranes and no mucosal ulcerations;normal hard and soft palate.  No evidence of lymphadenopathy.    Neck: Trachea midline.  No thyromegaly.    Musculoskeletal: No abnormal gait.    Skin: No lesions.    Mental: Cooperative with normal affect.  Is oriented to time, place, and person.    Neuro: Grossly intact.    Chest: Normal inspiratory effort.   No accessory muscles.  No audible wheezes.  Respirations symmetric on inspiration and expiration.    Heart: Regular rhythm.      Abdomen:  Soft, non-tender. No masses or organomegaly. Bladder is not palpable. No evidence of flank  discomfort. No evidence of inguinal hernia.    Genitourinary: The penis is circumcised with no evidence of plaques or induration. The urethral meatus is normal. The testes, epididymides, and cord structures are normal in size and contour bilaterally. The scrotum is normal in size and contour.    Normal anal sphincter tone. No rectal mass.    The prostate is 30 g. Normal landmarks. Lateral sulci. Median furrow intact.  Seminal vesicles are normal. + nodule at the R base.  1 cm.    Extremities: No clubbing, cyanosis, or edema      LABS:    UA dipped negative today.  Lab Results   Component Value Date    PSA 4.4 (H) 08/14/2019    PSA 3.5 08/08/2018    PSA 3.3 08/08/2017    PSADIAG 3.0 11/07/2018    PSADIAG 2.8 11/01/2017    PSADIAG 2.3 10/26/2016       IMPRESSION:    Encounter Diagnoses   Name Primary?    Elevated PSA Yes    BPH with urinary obstruction          PLAN:    1. Discussed that he has a prostate nodule.  Recommend TRUS bx.  2. Continue flomax for the LUTS.  Refilled today.  3. Risks and benefits of TRUS bx discussed in detail today.  We discussed bleeding, pain, and infection.  Alternatives discussed.  He was given the chance to ask quesitons.  A new order for gent was given for prophylaxis.  Will use gent as he has gotten tendonitis from cipro.    Copy to:

## 2019-09-05 NOTE — PATIENT INSTRUCTIONS
Prostate Biopsy  Cancer occurs when abnormal cells form a tumor (a lump of cells that grow uncontrolled). If the results of your exam and tests lead your doctor to suspect prostate cancer, a core needle biopsy will be done. A thin needle is used to remove small samples of prostate tissue. These samples are checked for cancer.    Taking Tissue Samples  A biopsy takes about 15 to 20 minutes. Before it starts, you may be given an enema or suppository to clear the bowels. Antibiotics are given at least one hour pior to the biopsy. During the procedure:  · You will be given antibiotics to prevent infection.  · You may be given a sedative, local anesthetic, or pain medication.  · A small probe is inserted into the rectum as you lie on your side. An image of your prostate can then be seen on a video monitor. This is called a transrectal ultrasound (TRUS).  · With the TRUS image as a guide, your doctor uses a thin needle to remove tiny tissue samples from several sites in the prostate.  Risks and Complications of Core Needle Biopsy   · Infection  · Blood in urine, stool, or semen       © 2000-2011 [facility], [address] [place] [address], [place] [address]. All rights reserved. This information is not intended as a substitute for professional medical care. Always follow your healthcare professional's instructions.

## 2019-09-05 NOTE — H&P (VIEW-ONLY)
CHIEF COMPLAINT:    Mr. Chin is a 59 y.o. male presenting with an elevated PSA.    PRESENTING ILLNESS:    Nilton Chin is a 59 y.o. male who had his PSA drawn by his PCP which returned elevated.  He's never had a biopsy.    He has nocturia x 1-2.  Slight decreased FOS.  No hematuria.  No dysuria.  He's on flomax and pleased with how he voids.    He denies ED.    REVIEW OF SYSTEMS:    Nilton Chin denies headache, blurred vision, fever, nausea, vomiting, chills, abdominal pain, bleeding per rectum, cough, SOB, recent loss of consciousness, recent mental status changes, seizures, dizziness, or upper or lower extremity weakness.    POLO  1. 5  2. 5  3. 5  4. 5  5. 5      PATIENT HISTORY:    Past Medical History:   Diagnosis Date    Allergy     BPH (benign prostatic hypertrophy)     History of prostatitis     Hypertension        History reviewed. No pertinent surgical history.    Family History   Problem Relation Age of Onset    Cataracts Mother     Hypertension Mother     Cataracts Father     Glaucoma Father     Hypertension Father     Blindness Other     Cancer Paternal Grandfather         lung/prostate    Diabetes Paternal Grandfather     Diabetes Maternal Grandmother     Heart disease Maternal Grandmother     Amblyopia Neg Hx     Strabismus Neg Hx     Retinal detachment Neg Hx     Macular degeneration Neg Hx     Stroke Neg Hx     Melanoma Neg Hx        Social History     Socioeconomic History    Marital status:      Spouse name: Not on file    Number of children: 3    Years of education: Not on file    Highest education level: Not on file   Occupational History    Occupation:    Social Needs    Financial resource strain: Not on file    Food insecurity:     Worry: Not on file     Inability: Not on file    Transportation needs:     Medical: Not on file     Non-medical: Not on file   Tobacco Use    Smoking status: Former Smoker     Years: 35.00     Types:  Cigarettes     Start date: 6/24/2013    Smokeless tobacco: Never Used    Tobacco comment: 6 cig a day   Substance and Sexual Activity    Alcohol use: Yes     Comment: socially/ not weekly    Drug use: No    Sexual activity: Yes     Partners: Female   Lifestyle    Physical activity:     Days per week: Not on file     Minutes per session: Not on file    Stress: Not on file   Relationships    Social connections:     Talks on phone: Not on file     Gets together: Not on file     Attends Hoahaoism service: Not on file     Active member of club or organization: Not on file     Attends meetings of clubs or organizations: Not on file     Relationship status: Not on file   Other Topics Concern    Not on file   Social History Narrative    Not on file       Allergies:  Codeine and Vicodin [hydrocodone-acetaminophen]    Medications:    Current Outpatient Medications:     losartan-hydrochlorothiazide 50-12.5 mg (HYZAAR) 50-12.5 mg per tablet, TAKE 1 TABLET BY MOUTH EVERY DAY, Disp: 90 tablet, Rfl: 3    tamsulosin (FLOMAX) 0.4 mg Cap, TAKE 1 CAPSULE BY MOUTH EVERY DAY, Disp: 90 capsule, Rfl: 3    PHYSICAL EXAMINATION:    The patient generally appears in good health, is appropriately interactive, and is in no apparent distress.     Eyes: anicteric sclerae, moist conjunctivae; no lid-lag; PERRLA     HENT: Atraumatic; oropharynx clear with moist mucous membranes and no mucosal ulcerations;normal hard and soft palate.  No evidence of lymphadenopathy.    Neck: Trachea midline.  No thyromegaly.    Musculoskeletal: No abnormal gait.    Skin: No lesions.    Mental: Cooperative with normal affect.  Is oriented to time, place, and person.    Neuro: Grossly intact.    Chest: Normal inspiratory effort.   No accessory muscles.  No audible wheezes.  Respirations symmetric on inspiration and expiration.    Heart: Regular rhythm.      Abdomen:  Soft, non-tender. No masses or organomegaly. Bladder is not palpable. No evidence of flank  discomfort. No evidence of inguinal hernia.    Genitourinary: The penis is circumcised with no evidence of plaques or induration. The urethral meatus is normal. The testes, epididymides, and cord structures are normal in size and contour bilaterally. The scrotum is normal in size and contour.    Normal anal sphincter tone. No rectal mass.    The prostate is 30 g. Normal landmarks. Lateral sulci. Median furrow intact.  Seminal vesicles are normal. + nodule at the R base.  1 cm.    Extremities: No clubbing, cyanosis, or edema      LABS:    UA dipped negative today.  Lab Results   Component Value Date    PSA 4.4 (H) 08/14/2019    PSA 3.5 08/08/2018    PSA 3.3 08/08/2017    PSADIAG 3.0 11/07/2018    PSADIAG 2.8 11/01/2017    PSADIAG 2.3 10/26/2016       IMPRESSION:    Encounter Diagnoses   Name Primary?    Elevated PSA Yes    BPH with urinary obstruction          PLAN:    1. Discussed that he has a prostate nodule.  Recommend TRUS bx.  2. Continue flomax for the LUTS.  Refilled today.  3. Risks and benefits of TRUS bx discussed in detail today.  We discussed bleeding, pain, and infection.  Alternatives discussed.  He was given the chance to ask quesitons.  A new order for gent was given for prophylaxis.  Will use gent as he has gotten tendonitis from cipro.    Copy to:

## 2019-09-05 NOTE — LETTER
September 5, 2019        Arnulfo Hammonds, DO  [address] [place] [address]             [facility] - Urology 4th Floor  [address]  [place] [address]  Phone: [phone]   Patient: Nilton Chin   MR Number: [id number]   YOB: 1960   Date of Visit: 9/5/2019       Dear Dr. Hammonds:    Thank you for referring Nilton Chin to me for evaluation. Attached you will find relevant portions of my assessment and plan of care.    If you have questions, please do not hesitate to call me. I look forward to following Nilton Chin along with you.    Sincerely,      Behzad Lopez MD            CC  No Recipients    Enclosure

## 2019-10-02 ENCOUNTER — TELEPHONE (OUTPATIENT)
Dept: UROLOGY | Facility: CLINIC | Age: 59
End: 2019-10-02

## 2019-10-02 NOTE — TELEPHONE ENCOUNTER
----- Message from Maria Victoria Walden MA sent at 10/2/2019  8:25 AM CDT -----  Contact: [phone] (S)  Pt has a Rx for Levocetirizine to take for his allergies, he is wanting to know if its something he needs to avoid taking since he is having a BX this Friday.  It's a prescription antihistamine. He has not taken it but would like to because he has a scratchy throat from his allergies.       [phone] (M)

## 2019-10-02 NOTE — TELEPHONE ENCOUNTER
Returned call to pt. Message left on voicemail ok to take levoceterizine. Number left for pt to call back if he had any further questions.

## 2019-10-04 ENCOUNTER — HOSPITAL ENCOUNTER (OUTPATIENT)
Dept: UROLOGY | Facility: HOSPITAL | Age: 59
Discharge: HOME | End: 2019-10-04
Attending: UROLOGY
Payer: COMMERCIAL

## 2019-10-04 VITALS
HEIGHT: 67 IN | RESPIRATION RATE: 17 BRPM | TEMPERATURE: 98 F | WEIGHT: 158.75 LBS | DIASTOLIC BLOOD PRESSURE: 89 MMHG | SYSTOLIC BLOOD PRESSURE: 145 MMHG | BODY MASS INDEX: 24.92 KG/M2 | HEART RATE: 80 BPM

## 2019-10-04 DIAGNOSIS — N40.2 PROSTATE NODULE: ICD-10-CM

## 2019-10-04 DIAGNOSIS — R97.20 ELEVATED PSA: ICD-10-CM

## 2019-10-04 PROCEDURE — 88305 TISSUE SPECIMEN TO PATHOLOGY, UROLOGY: ICD-10-PCS | Mod: 26,,, | Performed by: PATHOLOGY

## 2019-10-04 PROCEDURE — 55700 PR BIOPSY OF PROSTATE,NEEDLE/PUNCH: ICD-10-PCS | Mod: ,,, | Performed by: UROLOGY

## 2019-10-04 PROCEDURE — 76872 US TRANSRECTAL: CPT | Mod: 26,,, | Performed by: UROLOGY

## 2019-10-04 PROCEDURE — 76872 PR US TRANSRECTAL: ICD-10-PCS | Mod: 26,,, | Performed by: UROLOGY

## 2019-10-04 PROCEDURE — 76942 ECHO GUIDE FOR BIOPSY: CPT

## 2019-10-04 PROCEDURE — 55700 PR BIOPSY OF PROSTATE,NEEDLE/PUNCH: CPT | Mod: ,,, | Performed by: UROLOGY

## 2019-10-04 PROCEDURE — 76942 PR U/S GUIDANCE FOR NEEDLE GUIDANCE: ICD-10-PCS | Mod: 26,59,, | Performed by: UROLOGY

## 2019-10-04 PROCEDURE — 76872 US TRANSRECTAL: CPT

## 2019-10-04 PROCEDURE — 88305 TISSUE EXAM BY PATHOLOGIST: CPT | Performed by: PATHOLOGY

## 2019-10-04 PROCEDURE — 55700 HC BIOPSY OF PROSTATE - NEEDLE OR PUNCH: CPT

## 2019-10-04 PROCEDURE — 88305 TISSUE EXAM BY PATHOLOGIST: CPT | Mod: 26,,, | Performed by: PATHOLOGY

## 2019-10-04 PROCEDURE — 76942 ECHO GUIDE FOR BIOPSY: CPT | Mod: 26,59,, | Performed by: UROLOGY

## 2019-10-04 RX ORDER — LIDOCAINE HYDROCHLORIDE 10 MG/ML
10 INJECTION INFILTRATION; PERINEURAL ONCE
Status: COMPLETED | OUTPATIENT
Start: 2019-10-04 | End: 2019-10-04

## 2019-10-04 RX ORDER — LIDOCAINE HYDROCHLORIDE 20 MG/ML
JELLY TOPICAL ONCE
Status: COMPLETED | OUTPATIENT
Start: 2019-10-04 | End: 2019-10-04

## 2019-10-04 RX ADMIN — GENTAMICIN SULFATE 180 MG: 40 INJECTION, SOLUTION INTRAMUSCULAR; INTRAVENOUS at 09:10

## 2019-10-04 RX ADMIN — LIDOCAINE HYDROCHLORIDE: 20 JELLY TOPICAL at 09:10

## 2019-10-04 RX ADMIN — LIDOCAINE HYDROCHLORIDE 10 ML: 10 INJECTION INFILTRATION; PERINEURAL at 09:10

## 2019-10-04 NOTE — PROCEDURES
Date: 10/04/2019     Surgeon: John    Assistant: Alex    Procedure performed: Trans rectal ultrasound          Prostate biopsy          Ultrasound guidance for needle placement    Blood loss: Min.    Specimen: Prostate cores    Procedure in detail:   After informed consent was obtained, the patient was placed in the L lateral decubitus position.  A urojet was placed into the patient's rectum.      The ultrasound probe was then inserted into the patient's rectum.  Local anesthesia was provided by injecting 10 cc of 1% lidocaine at the junction of the SV and prostate bilaterally.    The prostate was visualized with the ultrasound and transrectal ultrasound guided biopsy of the prostate was performed in the standard fashion.  A total of 12 cores were taken.  2 cores were taken from the base, middle, and apex on each side of the prostate.    TRUS volume was 19.9 cc with no median lobe.    The patient tolerated the procedure well without complication.  He will be discharged home in stable condition.  He will follow up in 6 months with a PSA if the biopsy is negative.  Will follow up sooner if positive.

## 2019-10-04 NOTE — PATIENT INSTRUCTIONS

## 2019-10-06 ENCOUNTER — OFFICE VISIT (OUTPATIENT)
Dept: URGENT CARE | Facility: CLINIC | Age: 59
End: 2019-10-06
Payer: COMMERCIAL

## 2019-10-06 VITALS
SYSTOLIC BLOOD PRESSURE: 126 MMHG | WEIGHT: 158 LBS | DIASTOLIC BLOOD PRESSURE: 82 MMHG | TEMPERATURE: 97 F | HEIGHT: 67 IN | HEART RATE: 70 BPM | BODY MASS INDEX: 24.8 KG/M2 | OXYGEN SATURATION: 99 %

## 2019-10-06 DIAGNOSIS — J06.9 VIRAL URI WITH COUGH: Primary | ICD-10-CM

## 2019-10-06 DIAGNOSIS — J35.8 TONSIL STONE: ICD-10-CM

## 2019-10-06 PROCEDURE — 3008F BODY MASS INDEX DOCD: CPT | Mod: CPTII,S$GLB,, | Performed by: NURSE PRACTITIONER

## 2019-10-06 PROCEDURE — 3079F PR MOST RECENT DIASTOLIC BLOOD PRESSURE 80-89 MM HG: ICD-10-PCS | Mod: CPTII,S$GLB,, | Performed by: NURSE PRACTITIONER

## 2019-10-06 PROCEDURE — 99214 OFFICE O/P EST MOD 30 MIN: CPT | Mod: S$GLB,,, | Performed by: NURSE PRACTITIONER

## 2019-10-06 PROCEDURE — 99214 PR OFFICE/OUTPT VISIT, EST, LEVL IV, 30-39 MIN: ICD-10-PCS | Mod: S$GLB,,, | Performed by: NURSE PRACTITIONER

## 2019-10-06 PROCEDURE — 3008F PR BODY MASS INDEX (BMI) DOCUMENTED: ICD-10-PCS | Mod: CPTII,S$GLB,, | Performed by: NURSE PRACTITIONER

## 2019-10-06 PROCEDURE — 3074F SYST BP LT 130 MM HG: CPT | Mod: CPTII,S$GLB,, | Performed by: NURSE PRACTITIONER

## 2019-10-06 PROCEDURE — 3079F DIAST BP 80-89 MM HG: CPT | Mod: CPTII,S$GLB,, | Performed by: NURSE PRACTITIONER

## 2019-10-06 PROCEDURE — 3074F PR MOST RECENT SYSTOLIC BLOOD PRESSURE < 130 MM HG: ICD-10-PCS | Mod: CPTII,S$GLB,, | Performed by: NURSE PRACTITIONER

## 2019-10-06 RX ORDER — BENZONATATE 100 MG/1
100 CAPSULE ORAL 3 TIMES DAILY PRN
Qty: 30 CAPSULE | Refills: 0 | Status: SHIPPED | OUTPATIENT
Start: 2019-10-06 | End: 2019-10-16

## 2019-10-06 RX ORDER — PROMETHAZINE HYDROCHLORIDE AND DEXTROMETHORPHAN HYDROBROMIDE 6.25; 15 MG/5ML; MG/5ML
5 SYRUP ORAL
Qty: 118 ML | Refills: 0 | Status: SHIPPED | OUTPATIENT
Start: 2019-10-06 | End: 2019-10-09 | Stop reason: SDUPTHER

## 2019-10-06 RX ORDER — PREDNISONE 20 MG/1
20 TABLET ORAL DAILY
Qty: 3 TABLET | Refills: 0 | Status: SHIPPED | OUTPATIENT
Start: 2019-10-06 | End: 2019-10-09

## 2019-10-06 RX ORDER — FLUTICASONE PROPIONATE 50 MCG
1 SPRAY, SUSPENSION (ML) NASAL 2 TIMES DAILY
Qty: 1 BOTTLE | Refills: 0 | Status: SHIPPED | OUTPATIENT
Start: 2019-10-06 | End: 2020-07-16

## 2019-10-06 RX ORDER — GENTAMICIN SULFATE 40 MG/ML
INJECTION, SOLUTION INTRAMUSCULAR; INTRAVENOUS
COMMUNITY
End: 2019-12-03 | Stop reason: ALTCHOICE

## 2019-10-06 NOTE — PROGRESS NOTES
"Subjective:       Patient ID: Nilton Chin is a 59 y.o. male.    Vitals:  height is 5' 7" (1.702 m) and weight is 71.7 kg (158 lb). His temperature is 97.2 °F (36.2 °C). His blood pressure is 126/82 and his pulse is 70. His oxygen saturation is 99%.     Chief Complaint: Cough (cough, post nasal drip, congestion)    Pt here today for c/o dry cough and post nasal drip x6 days. Cough has become progressively worse. He tried taking OTC Delsym and Ricola cough drops yesterday, however symptoms did not improve. He denies fever, wheezing, SOB, rash, sore throat, or N/V/D.    Cough   This is a new problem. The current episode started in the past 7 days. The problem has been gradually worsening. The problem occurs constantly. The cough is non-productive. Associated symptoms include postnasal drip. Pertinent negatives include no chills, ear pain, eye redness, fever, hemoptysis, myalgias, rash, sore throat, shortness of breath or wheezing. Nothing aggravates the symptoms. He has tried rest for the symptoms. The treatment provided no relief.       Constitution: Negative for chills, sweating, fatigue and fever.   HENT: Positive for congestion and postnasal drip. Negative for ear pain, sinus pain, sinus pressure, sore throat and voice change.    Neck: Negative for painful lymph nodes.   Eyes: Negative for eye redness.   Respiratory: Positive for cough. Negative for chest tightness, sputum production, bloody sputum, COPD, shortness of breath, stridor, wheezing and asthma.    Gastrointestinal: Negative for nausea and vomiting.   Musculoskeletal: Negative for muscle ache.   Skin: Negative for rash.   Allergic/Immunologic: Negative for seasonal allergies and asthma.   Hematologic/Lymphatic: Negative for swollen lymph nodes.       Objective:      Physical Exam   Constitutional: He is oriented to person, place, and time. He appears well-developed and well-nourished. He is cooperative.  Non-toxic appearance. He does not appear ill. No " distress.   HENT:   Head: Normocephalic and atraumatic.   Right Ear: Hearing, external ear and ear canal normal. No drainage, swelling or tenderness. Tympanic membrane is injected. Tympanic membrane is not retracted and not bulging. No middle ear effusion. No decreased hearing is noted.   Left Ear: Hearing, external ear and ear canal normal. No drainage, swelling or tenderness. Tympanic membrane is injected. Tympanic membrane is not retracted and not bulging.  No middle ear effusion. No decreased hearing is noted.   Nose: Mucosal edema and rhinorrhea present. No nasal deformity. No epistaxis. Right sinus exhibits no maxillary sinus tenderness and no frontal sinus tenderness. Left sinus exhibits no maxillary sinus tenderness and no frontal sinus tenderness.   Mouth/Throat: Uvula is midline, oropharynx is clear and moist and mucous membranes are normal. No trismus in the jaw. Normal dentition. No uvula swelling. No posterior oropharyngeal erythema (cobblestoning noted). Tonsils are 1+ on the right. Tonsils are 1+ on the left. No tonsillar exudate.       Eyes: Conjunctivae and lids are normal. No scleral icterus.   Sclera clear bilat   Neck: Trachea normal, full passive range of motion without pain and phonation normal. Neck supple.   Cardiovascular: Normal rate, regular rhythm, normal heart sounds, intact distal pulses and normal pulses.   Pulmonary/Chest: Effort normal and breath sounds normal. No stridor. No respiratory distress. He has no decreased breath sounds. He has no wheezes. He has no rhonchi. He has no rales.   Abdominal: Soft. Normal appearance and bowel sounds are normal. He exhibits no distension. There is no tenderness.   Musculoskeletal: Normal range of motion. He exhibits no edema or deformity.   Neurological: He is alert and oriented to person, place, and time. He exhibits normal muscle tone. Coordination normal.   Skin: Skin is warm, dry and intact. He is not diaphoretic. No pallor.   Psychiatric:  He has a normal mood and affect. His speech is normal and behavior is normal. Judgment and thought content normal. Cognition and memory are normal.   Nursing note and vitals reviewed.      Assessment:       1. Viral URI with cough    2. Tonsil stone        Plan:         Viral URI with cough  -     predniSONE (DELTASONE) 20 MG tablet; Take 1 tablet (20 mg total) by mouth once daily. for 3 days  Dispense: 3 tablet; Refill: 0  -     benzonatate (TESSALON) 100 MG capsule; Take 1 capsule (100 mg total) by mouth 3 (three) times daily as needed.  Dispense: 30 capsule; Refill: 0  -     promethazine-dextromethorphan (PROMETHAZINE-DM) 6.25-15 mg/5 mL Syrp; Take 5 mLs by mouth every 4 to 6 hours as needed. 5 mL every 4 to 6 hours; maximum: 30 mL in 24 hours  Dispense: 118 mL; Refill: 0  -     fluticasone propionate (FLONASE) 50 mcg/actuation nasal spray; 1 spray (50 mcg total) by Each Nostril route 2 (two) times daily.  Dispense: 1 Bottle; Refill: 0    Tonsil stone          Patient Instructions       PLEASE READ YOUR DISCHARGE INSTRUCTIONS ENTIRELY AS IT CONTAINS IMPORTANT INFORMATION.    Please take your steroids to completion.     Take Tessalon perles as prescribed for cough (non-drowsy).    Do not drive while taking the cough syrup - best to take it at night before going to sleep. However, you can take it during the day (every 4-6 hours) if you do not have to drive or operate machinery. This medication will make you drowsy. Try taking half a dose first to see how it affects you.    Please drink plenty of fluids.    Please get plenty of rest.    Please return here or go to the Emergency Department for any concerns or worsening of condition.    Please take an over the counter antihistamine medication (allegra/Claritin/Zyrtec) of your choice as directed.    Try an over the counter decongestant like Mucinex.    If you do have Hypertension or palpitations, it is safe to take Coricidin HBP for relief of sinus symptoms.    If not  allergic, please take over the counter Tylenol (Acetaminophen) and/or Motrin (Ibuprofen) as directed for control of pain and/or fever.  Please follow up with your primary care doctor or specialist as needed.    Sore throat recommendations: Warm fluids, warm salt water gargles, throat lozenges, tea, honey, soup, rest, hydration.    Use over the counter flonase: one spray each nostril twice daily OR two sprays each nostril once daily.     If you  smoke, please stop smoking.      Please return or see your primary care doctor if you develop new or worsening symptoms.     Please arrange follow up with your primary medical clinic as soon as possible. You must understand that you've received an Urgent Care treatment only and that you may be released before all of your medical problems are known or treated. You, the patient, will arrange for follow up as instructed. If your symptoms worsen or fail to improve you should go to the Emergency Room.      Viral Upper Respiratory Illness (Adult)  You have a viral upper respiratory illness (URI), which is another term for the common cold. This illness is contagious during the first few days. It is spread through the air by coughing and sneezing. It may also be spread by direct contact (touching the sick person and then touching your own eyes, nose, or mouth). Frequent handwashing will decrease risk of spread. Most viral illnesses go away within 7 to 10 days with rest and simple home remedies. Sometimes the illness may last for several weeks. Antibiotics will not kill a virus, and they are generally not prescribed for this condition.    Home care  · If symptoms are severe, rest at home for the first 2 to 3 days. When you resume activity, don't let yourself get too tired.  · Avoid being exposed to cigarette smoke (yours or others).  · You may use acetaminophen or ibuprofen to control pain and fever, unless another medicine was prescribed. (Note: If you have chronic liver or kidney  disease, have ever had a stomach ulcer or gastrointestinal bleeding, or are taking blood-thinning medicines, talk with your healthcare provider before using these medicines.) Aspirin should never be given to anyone under 18 years of age who is ill with a viral infection or fever. It may cause severe liver or brain damage.  · Your appetite may be poor, so a light diet is fine. Avoid dehydration by drinking 6 to 8 glasses of fluids per day (water, soft drinks, juices, tea, or soup). Extra fluids will help loosen secretions in the nose and lungs.  · Over-the-counter cold medicines will not shorten the length of time youre sick, but they may be helpful for the following symptoms: cough, sore throat, and nasal and sinus congestion. (Note: Do not use decongestants if you have high blood pressure.)  Follow-up care  Follow up with your healthcare provider, or as advised.  When to seek medical advice  Call your healthcare provider right away if any of these occur:  · Cough with lots of colored sputum (mucus)  · Severe headache; face, neck, or ear pain  · Difficulty swallowing due to throat pain  · Fever of 100.4°F (38°C)  Call 911, or get immediate medical care  Call emergency services right away if any of these occur:  · Chest pain, shortness of breath, wheezing, or difficulty breathing  · Coughing up blood  · Inability to swallow due to throat pain  Date Last Reviewed: 9/13/2015  © 8161-1875 [employer]. [address] [place] [address], [place] [address]. All rights reserved. This information is not intended as a substitute for professional medical care. Always follow your healthcare professional's instructions.

## 2019-10-06 NOTE — PATIENT INSTRUCTIONS
PLEASE READ YOUR DISCHARGE INSTRUCTIONS ENTIRELY AS IT CONTAINS IMPORTANT INFORMATION.    Please take your steroids to completion.     Take Tessalon perles as prescribed for cough (non-drowsy).    Do not drive while taking the cough syrup - best to take it at night before going to sleep. However, you can take it during the day (every 4-6 hours) if you do not have to drive or operate machinery. This medication will make you drowsy. Try taking half a dose first to see how it affects you.    Please drink plenty of fluids.    Please get plenty of rest.    Please return here or go to the Emergency Department for any concerns or worsening of condition.    Please take an over the counter antihistamine medication (allegra/Claritin/Zyrtec) of your choice as directed.    Try an over the counter decongestant like Mucinex.    If you do have Hypertension or palpitations, it is safe to take Coricidin HBP for relief of sinus symptoms.    If not allergic, please take over the counter Tylenol (Acetaminophen) and/or Motrin (Ibuprofen) as directed for control of pain and/or fever.  Please follow up with your primary care doctor or specialist as needed.    Sore throat recommendations: Warm fluids, warm salt water gargles, throat lozenges, tea, honey, soup, rest, hydration.    Use over the counter flonase: one spray each nostril twice daily OR two sprays each nostril once daily.     If you  smoke, please stop smoking.      Please return or see your primary care doctor if you develop new or worsening symptoms.     Please arrange follow up with your primary medical clinic as soon as possible. You must understand that you've received an Urgent Care treatment only and that you may be released before all of your medical problems are known or treated. You, the patient, will arrange for follow up as instructed. If your symptoms worsen or fail to improve you should go to the Emergency Room.      Viral Upper Respiratory Illness (Adult)  You  have a viral upper respiratory illness (URI), which is another term for the common cold. This illness is contagious during the first few days. It is spread through the air by coughing and sneezing. It may also be spread by direct contact (touching the sick person and then touching your own eyes, nose, or mouth). Frequent handwashing will decrease risk of spread. Most viral illnesses go away within 7 to 10 days with rest and simple home remedies. Sometimes the illness may last for several weeks. Antibiotics will not kill a virus, and they are generally not prescribed for this condition.    Home care  · If symptoms are severe, rest at home for the first 2 to 3 days. When you resume activity, don't let yourself get too tired.  · Avoid being exposed to cigarette smoke (yours or others).  · You may use acetaminophen or ibuprofen to control pain and fever, unless another medicine was prescribed. (Note: If you have chronic liver or kidney disease, have ever had a stomach ulcer or gastrointestinal bleeding, or are taking blood-thinning medicines, talk with your healthcare provider before using these medicines.) Aspirin should never be given to anyone under 18 years of age who is ill with a viral infection or fever. It may cause severe liver or brain damage.  · Your appetite may be poor, so a light diet is fine. Avoid dehydration by drinking 6 to 8 glasses of fluids per day (water, soft drinks, juices, tea, or soup). Extra fluids will help loosen secretions in the nose and lungs.  · Over-the-counter cold medicines will not shorten the length of time youre sick, but they may be helpful for the following symptoms: cough, sore throat, and nasal and sinus congestion. (Note: Do not use decongestants if you have high blood pressure.)  Follow-up care  Follow up with your healthcare provider, or as advised.  When to seek medical advice  Call your healthcare provider right away if any of these occur:  · Cough with lots of colored  sputum (mucus)  · Severe headache; face, neck, or ear pain  · Difficulty swallowing due to throat pain  · Fever of 100.4°F (38°C)  Call 911, or get immediate medical care  Call emergency services right away if any of these occur:  · Chest pain, shortness of breath, wheezing, or difficulty breathing  · Coughing up blood  · Inability to swallow due to throat pain  Date Last Reviewed: 9/13/2015  © 2521-8443 [employer]. [address] [place], [place] [address]. All rights reserved. This information is not intended as a substitute for professional medical care. Always follow your healthcare professional's instructions.

## 2019-10-08 ENCOUNTER — OFFICE VISIT (OUTPATIENT)
Dept: INTERNAL MEDICINE | Facility: CLINIC | Age: 59
End: 2019-10-08
Payer: COMMERCIAL

## 2019-10-08 VITALS
HEART RATE: 88 BPM | DIASTOLIC BLOOD PRESSURE: 80 MMHG | TEMPERATURE: 98 F | HEIGHT: 67 IN | SYSTOLIC BLOOD PRESSURE: 126 MMHG | RESPIRATION RATE: 18 BRPM | BODY MASS INDEX: 25.19 KG/M2 | WEIGHT: 160.5 LBS

## 2019-10-08 DIAGNOSIS — H66.002 ACUTE SUPPURATIVE OTITIS MEDIA OF LEFT EAR WITHOUT SPONTANEOUS RUPTURE OF TYMPANIC MEMBRANE, RECURRENCE NOT SPECIFIED: Primary | ICD-10-CM

## 2019-10-08 PROCEDURE — 99999 PR PBB SHADOW E&M-EST. PATIENT-LVL III: ICD-10-PCS | Mod: PBBFAC,,, | Performed by: FAMILY MEDICINE

## 2019-10-08 PROCEDURE — 3079F PR MOST RECENT DIASTOLIC BLOOD PRESSURE 80-89 MM HG: ICD-10-PCS | Mod: CPTII,S$GLB,, | Performed by: FAMILY MEDICINE

## 2019-10-08 PROCEDURE — 3074F SYST BP LT 130 MM HG: CPT | Mod: CPTII,S$GLB,, | Performed by: FAMILY MEDICINE

## 2019-10-08 PROCEDURE — 3079F DIAST BP 80-89 MM HG: CPT | Mod: CPTII,S$GLB,, | Performed by: FAMILY MEDICINE

## 2019-10-08 PROCEDURE — 3074F PR MOST RECENT SYSTOLIC BLOOD PRESSURE < 130 MM HG: ICD-10-PCS | Mod: CPTII,S$GLB,, | Performed by: FAMILY MEDICINE

## 2019-10-08 PROCEDURE — 3008F PR BODY MASS INDEX (BMI) DOCUMENTED: ICD-10-PCS | Mod: CPTII,S$GLB,, | Performed by: FAMILY MEDICINE

## 2019-10-08 PROCEDURE — 99214 OFFICE O/P EST MOD 30 MIN: CPT | Mod: S$GLB,,, | Performed by: FAMILY MEDICINE

## 2019-10-08 PROCEDURE — 99999 PR PBB SHADOW E&M-EST. PATIENT-LVL III: CPT | Mod: PBBFAC,,, | Performed by: FAMILY MEDICINE

## 2019-10-08 PROCEDURE — 3008F BODY MASS INDEX DOCD: CPT | Mod: CPTII,S$GLB,, | Performed by: FAMILY MEDICINE

## 2019-10-08 PROCEDURE — 99214 PR OFFICE/OUTPT VISIT, EST, LEVL IV, 30-39 MIN: ICD-10-PCS | Mod: S$GLB,,, | Performed by: FAMILY MEDICINE

## 2019-10-08 RX ORDER — AMOXICILLIN AND CLAVULANATE POTASSIUM 875; 125 MG/1; MG/1
1 TABLET, FILM COATED ORAL 2 TIMES DAILY
Qty: 20 TABLET | Refills: 0 | Status: SHIPPED | OUTPATIENT
Start: 2019-10-08 | End: 2019-10-18

## 2019-10-08 NOTE — PROGRESS NOTES
Subjective:       Patient ID: Nilton Chin is a 59 y.o. male.    Chief Complaint: Cough and URI    HPI 59-year-old white male presents to clinic today secondary to a complaint of continued postnasal drip, runny nose, sore throat, ear pain, chest congestion, and cough productive of green sputum for the past week.  He presented to urgent care on Friday and was diagnosed with viral sinusitis.  He was prescribed Tessalon Perles, Phenergan DM cough syrup, prednisone, and Flonase nasal spray which he has been using without relief.  Review of Systems   Constitutional: Negative for appetite change, chills, fatigue and fever.   HENT: Positive for ear pain, postnasal drip, rhinorrhea and sore throat. Negative for congestion, hearing loss, sinus pressure and tinnitus.    Eyes: Negative for redness, itching and visual disturbance.   Respiratory: Positive for cough (Greenish sputum) and chest tightness. Negative for shortness of breath.    Cardiovascular: Negative for chest pain and palpitations.   Gastrointestinal: Negative for abdominal pain, constipation, diarrhea, nausea and vomiting.   Genitourinary: Negative for decreased urine volume, difficulty urinating, dysuria, frequency, hematuria and urgency.   Musculoskeletal: Negative for back pain, myalgias, neck pain and neck stiffness.   Skin: Negative for rash.   Neurological: Negative for dizziness, light-headedness and headaches.   Psychiatric/Behavioral: Negative.        Objective:      Physical Exam   Constitutional: He is oriented to person, place, and time. He appears well-developed and well-nourished. No distress.   HENT:   Head: Normocephalic and atraumatic.   Right Ear: External ear normal. A middle ear effusion is present.   Left Ear: External ear normal. Tympanic membrane is erythematous and bulging.   Nose: Mucosal edema and rhinorrhea present. No nose lacerations, sinus tenderness, nasal deformity, septal deviation or nasal septal hematoma. No epistaxis.  No  foreign bodies. Right sinus exhibits no maxillary sinus tenderness and no frontal sinus tenderness. Left sinus exhibits no maxillary sinus tenderness and no frontal sinus tenderness.   Mouth/Throat: Oropharynx is clear and moist. No oropharyngeal exudate.   Eyes: Pupils are equal, round, and reactive to light. Conjunctivae and EOM are normal. Right eye exhibits no discharge. Left eye exhibits no discharge. No scleral icterus.   Neck: Normal range of motion. Neck supple. No JVD present. No tracheal deviation present. No thyromegaly present.   Cardiovascular: Normal rate, regular rhythm, normal heart sounds and intact distal pulses. Exam reveals no gallop and no friction rub.   No murmur heard.  Pulmonary/Chest: Effort normal and breath sounds normal. No stridor. No respiratory distress. He has no wheezes. He has no rales.   Abdominal: Soft. Bowel sounds are normal. He exhibits no distension and no mass. There is no tenderness. There is no rebound and no guarding.   Musculoskeletal: Normal range of motion. He exhibits no edema or tenderness.   Lymphadenopathy:     He has no cervical adenopathy.   Neurological: He is alert and oriented to person, place, and time.   Skin: Skin is warm and dry. No rash noted. He is not diaphoretic. No erythema. No pallor.   Psychiatric: He has a normal mood and affect. His behavior is normal. Judgment and thought content normal.   Nursing note and vitals reviewed.      Assessment:       1. Acute suppurative otitis media of left ear without spontaneous rupture of tympanic membrane, recurrence not specified        Plan:       Acute suppurative otitis media of left ear without spontaneous rupture of tympanic membrane, recurrence not specified  -     amoxicillin-clavulanate 875-125mg (AUGMENTIN) 875-125 mg per tablet; Take 1 tablet by mouth 2 (two) times daily. for 10 days  Dispense: 20 tablet; Refill: 0      Continue Flonase nasal spray, Tessalon Perles, and Phenergan DM cough syrup as  prescribed.  Continue Xyzal as prescribed.  Tylenol and ibuprofen as needed for fever or pain.  Saltwater gargle or Listerine gargle as needed for sore throat.  Return to clinic as needed if symptoms persist or worsen.

## 2019-10-09 DIAGNOSIS — J06.9 VIRAL URI WITH COUGH: ICD-10-CM

## 2019-10-09 RX ORDER — PROMETHAZINE HYDROCHLORIDE AND DEXTROMETHORPHAN HYDROBROMIDE 6.25; 15 MG/5ML; MG/5ML
5 SYRUP ORAL
Qty: 118 ML | Refills: 0 | Status: SHIPPED | OUTPATIENT
Start: 2019-10-09 | End: 2019-12-03

## 2019-10-09 NOTE — TELEPHONE ENCOUNTER
----- Message from Luis Evans sent at 10/9/2019  4:45 PM CDT -----  Contact: self   Patient is calling for an RX refill or new RX.  Is this a refill or new RX:  refill  RX name and strength: promethazine-dextromethorphan (PROMETHAZINE-DM) 6.25-15 mg/5 mL Syrp  Directions (copy/paste from chart):    Is this a 30 day or 90 day RX:    Local pharmacy or mail order pharmacy:  [facility][id number] [address] AT [facility] [phone] (Phone)  [phone] (Fax)  Pharmacy name and phone # (copy/paste from chart):     Comments:  Patient states cough has not gone away

## 2019-10-09 NOTE — TELEPHONE ENCOUNTER
----- Message from Luis Evans sent at 10/9/2019  4:45 PM CDT -----  Contact: self   Patient is calling for an RX refill or new RX.  Is this a refill or new RX:  refill  RX name and strength: promethazine-dextromethorphan (PROMETHAZINE-DM) 6.25-15 mg/5 mL Syrp  Directions (copy/paste from chart):    Is this a 30 day or 90 day RX:    Local pharmacy or mail order pharmacy:  [facility] #[id number] [address] AT [facility] [phone] (Phone)  [phone] (Fax)  Pharmacy name and phone # (copy/paste from chart):     Comments:  Patient states cough has not gone away

## 2019-10-14 ENCOUNTER — TELEPHONE (OUTPATIENT)
Dept: UROLOGY | Facility: CLINIC | Age: 59
End: 2019-10-14

## 2019-10-16 ENCOUNTER — PATIENT OUTREACH (OUTPATIENT)
Dept: ADMINISTRATIVE | Facility: OTHER | Age: 59
End: 2019-10-16

## 2019-10-17 ENCOUNTER — OFFICE VISIT (OUTPATIENT)
Dept: UROLOGY | Facility: CLINIC | Age: 59
End: 2019-10-17
Payer: COMMERCIAL

## 2019-10-17 ENCOUNTER — LAB VISIT (OUTPATIENT)
Dept: LAB | Facility: HOSPITAL | Age: 59
End: 2019-10-17
Attending: UROLOGY
Payer: COMMERCIAL

## 2019-10-17 VITALS
SYSTOLIC BLOOD PRESSURE: 135 MMHG | HEIGHT: 67 IN | HEART RATE: 80 BPM | WEIGHT: 158.75 LBS | BODY MASS INDEX: 24.92 KG/M2 | DIASTOLIC BLOOD PRESSURE: 90 MMHG

## 2019-10-17 DIAGNOSIS — C61 PROSTATE CANCER: ICD-10-CM

## 2019-10-17 DIAGNOSIS — C61 PROSTATE CANCER: Primary | ICD-10-CM

## 2019-10-17 LAB
CREAT SERPL-MCNC: 1 MG/DL (ref 0.5–1.4)
EST. GFR  (AFRICAN AMERICAN): >60 ML/MIN/1.73 M^2
EST. GFR  (NON AFRICAN AMERICAN): >60 ML/MIN/1.73 M^2

## 2019-10-17 PROCEDURE — 3080F DIAST BP >= 90 MM HG: CPT | Mod: CPTII,S$GLB,, | Performed by: UROLOGY

## 2019-10-17 PROCEDURE — 99999 PR PBB SHADOW E&M-EST. PATIENT-LVL IV: ICD-10-PCS | Mod: PBBFAC,,, | Performed by: UROLOGY

## 2019-10-17 PROCEDURE — 99215 PR OFFICE/OUTPT VISIT, EST, LEVL V, 40-54 MIN: ICD-10-PCS | Mod: S$GLB,,, | Performed by: UROLOGY

## 2019-10-17 PROCEDURE — 3008F PR BODY MASS INDEX (BMI) DOCUMENTED: ICD-10-PCS | Mod: CPTII,S$GLB,, | Performed by: UROLOGY

## 2019-10-17 PROCEDURE — 3080F PR MOST RECENT DIASTOLIC BLOOD PRESSURE >= 90 MM HG: ICD-10-PCS | Mod: CPTII,S$GLB,, | Performed by: UROLOGY

## 2019-10-17 PROCEDURE — 82565 ASSAY OF CREATININE: CPT

## 2019-10-17 PROCEDURE — 3075F PR MOST RECENT SYSTOLIC BLOOD PRESS GE 130-139MM HG: ICD-10-PCS | Mod: CPTII,S$GLB,, | Performed by: UROLOGY

## 2019-10-17 PROCEDURE — 99215 OFFICE O/P EST HI 40 MIN: CPT | Mod: S$GLB,,, | Performed by: UROLOGY

## 2019-10-17 PROCEDURE — 36415 COLL VENOUS BLD VENIPUNCTURE: CPT

## 2019-10-17 PROCEDURE — 99999 PR PBB SHADOW E&M-EST. PATIENT-LVL IV: CPT | Mod: PBBFAC,,, | Performed by: UROLOGY

## 2019-10-17 PROCEDURE — 3075F SYST BP GE 130 - 139MM HG: CPT | Mod: CPTII,S$GLB,, | Performed by: UROLOGY

## 2019-10-17 PROCEDURE — 3008F BODY MASS INDEX DOCD: CPT | Mod: CPTII,S$GLB,, | Performed by: UROLOGY

## 2019-10-17 NOTE — PROGRESS NOTES
Chief Complaint: Prostate cancer    HPI:  Mr. Chin is a 59 y.o. gentleman who is  Damaso's ([facility] [employer]) brother with a history of an elevated PSA.  His PSA was   Lab Results   Component Value Date    PSA 4.4 (H) 08/14/2019    PSADIAG 3.0 11/07/2018   . He underwent a TRUS bx showing Ulises 4+4 in the R mid, 3+3 in the L mid, and 3+4 in the L apex, R apex, and R base.  TRUS volume was 20 cc with no median lobe.    Today's visit was spent almost entirely on counseling. 45 minutes of counseling time was spent.  We reviewed his diagnosis, stage, grade, and prognosis.  We discussed the concept of low risk, moderate risk, and high risk disease. We discussed the different treatment options including active surveillance (as well as surveillance protocol), prostate brachytherapy, IMRT, IGRT, cryotherapy, and both open and robotic prostatectomy.We also discussed the advantages, disadvantages, risks and benefits of the different options. Regarding radiation therapy we discussed treatment planning, the different techniques, short and long term complications. These included radiation cystitis, radiation proctitis, and impotence. We discussed success, failure, and salvage therapeutic options.     We discussed surgical therapy in depth including preoperative preparation, surgical technique (including bladder neck and nerve-sparing techniques), postoperative recuperation and recovery, and short and long term complications. We discussed the risks of reoperation, incontinence and impotence. We discussed the chance of rectal injury, obturator nerve injury, and occult injury to the bowel during verress needle access.  We discussed preop and postop Kegels, post op penile rehab, and treatment options for incontinence and impotence. We discussed success, failure and salvage therapeutic options. We discussed the possible  indications for adjuvant radiation therapy.    He'd like RALP.  Will get MRI for staging and consult   [facility].

## 2019-11-11 ENCOUNTER — PATIENT OUTREACH (OUTPATIENT)
Dept: ADMINISTRATIVE | Facility: OTHER | Age: 59
End: 2019-11-11

## 2019-11-12 ENCOUNTER — HOSPITAL ENCOUNTER (OUTPATIENT)
Dept: RADIOLOGY | Facility: HOSPITAL | Age: 59
Discharge: HOME | End: 2019-11-12
Attending: UROLOGY
Payer: COMMERCIAL

## 2019-11-12 DIAGNOSIS — C61 PROSTATE CANCER: ICD-10-CM

## 2019-11-12 PROCEDURE — 72197 MRI PELVIS W/O & W/DYE: CPT | Mod: 26,,, | Performed by: RADIOLOGY

## 2019-11-12 PROCEDURE — 25500020 PHARM REV CODE 255: Performed by: UROLOGY

## 2019-11-12 PROCEDURE — 72197 MRI PELVIS W/O & W/DYE: CPT | Mod: TC

## 2019-11-12 PROCEDURE — 72197 MRI PROSTATE W W/O CONTRAST: ICD-10-PCS | Mod: 26,,, | Performed by: RADIOLOGY

## 2019-11-12 PROCEDURE — A9585 GADOBUTROL INJECTION: HCPCS | Performed by: UROLOGY

## 2019-11-12 RX ORDER — GADOBUTROL 604.72 MG/ML
7 INJECTION INTRAVENOUS
Status: COMPLETED | OUTPATIENT
Start: 2019-11-12 | End: 2019-11-12

## 2019-11-12 RX ADMIN — GADOBUTROL 7 ML: 604.72 INJECTION INTRAVENOUS at 02:11

## 2019-11-14 ENCOUNTER — OFFICE VISIT (OUTPATIENT)
Dept: UROLOGY | Facility: CLINIC | Age: 59
End: 2019-11-14
Payer: COMMERCIAL

## 2019-11-14 VITALS
SYSTOLIC BLOOD PRESSURE: 153 MMHG | DIASTOLIC BLOOD PRESSURE: 94 MMHG | BODY MASS INDEX: 24.52 KG/M2 | HEART RATE: 110 BPM | WEIGHT: 156.5 LBS

## 2019-11-14 DIAGNOSIS — C61 PROSTATE CANCER: Primary | ICD-10-CM

## 2019-11-14 PROCEDURE — 99214 OFFICE O/P EST MOD 30 MIN: CPT | Mod: S$GLB,,, | Performed by: UROLOGY

## 2019-11-14 PROCEDURE — 3008F BODY MASS INDEX DOCD: CPT | Mod: CPTII,S$GLB,, | Performed by: UROLOGY

## 2019-11-14 PROCEDURE — 3080F DIAST BP >= 90 MM HG: CPT | Mod: CPTII,S$GLB,, | Performed by: UROLOGY

## 2019-11-14 PROCEDURE — 3077F PR MOST RECENT SYSTOLIC BLOOD PRESSURE >= 140 MM HG: ICD-10-PCS | Mod: CPTII,S$GLB,, | Performed by: UROLOGY

## 2019-11-14 PROCEDURE — 99999 PR PBB SHADOW E&M-EST. PATIENT-LVL III: CPT | Mod: PBBFAC,,, | Performed by: UROLOGY

## 2019-11-14 PROCEDURE — 3077F SYST BP >= 140 MM HG: CPT | Mod: CPTII,S$GLB,, | Performed by: UROLOGY

## 2019-11-14 PROCEDURE — 99214 PR OFFICE/OUTPT VISIT, EST, LEVL IV, 30-39 MIN: ICD-10-PCS | Mod: S$GLB,,, | Performed by: UROLOGY

## 2019-11-14 PROCEDURE — 3008F PR BODY MASS INDEX (BMI) DOCUMENTED: ICD-10-PCS | Mod: CPTII,S$GLB,, | Performed by: UROLOGY

## 2019-11-14 PROCEDURE — 3080F PR MOST RECENT DIASTOLIC BLOOD PRESSURE >= 90 MM HG: ICD-10-PCS | Mod: CPTII,S$GLB,, | Performed by: UROLOGY

## 2019-11-14 PROCEDURE — 99999 PR PBB SHADOW E&M-EST. PATIENT-LVL III: ICD-10-PCS | Mod: PBBFAC,,, | Performed by: UROLOGY

## 2019-11-14 NOTE — LETTER
November 14, 2019      Behzad Lopez MD  [address]  [facility] [address]           [facility]  [address]  [facility] [address]  Phone: [phone]          Patient: Nilton Chin   MR Number: [id number]   YOB: 1960   Date of Visit: 11/14/2019       Dear Dr. Behzad Lopez:    Thank you for referring Nilton Chin to me for evaluation. Attached you will find relevant portions of my assessment and plan of care.    If you have questions, please do not hesitate to call me. I look forward to following Nilton Chin along with you.    Sincerely,    Jose Carlos Lawrence MD    Enclosure  CC:  No Recipients    If you would like to receive this communication electronically, please contact [email or web address] or [phone] to request more information on [employer] access.    For providers and/or their staff who would like to refer a patient to [facility], please contact us through our one-stop-shop provider referral line, [facility], at [phone].    If you feel you have received this communication in error or would no longer like to receive these types of communications, please e-mail [email or web address]

## 2019-11-14 NOTE — PROGRESS NOTES
Clinic Note  11/14/2019      Subjective:         Chief Complaint:   HPI  Nilton Chin is a 59 y.o. male  Recently diagnosed with prostate cancer. Consult from Dr. Lopez.  Here with wife Maisha and daughter.  Negative family history.  [place] [employer]  LUTS treated with Flomax.    Stage-T2  PSA 4.4  Volume- 16 ccs (MRI)  MRI- 3.2 cm RPZ PI-RADS 5 lesion. Negative extra prostatic extension, NVB, Bladder, seminal vesicle, nodes.  Biopsy- 10/4 [place] 8 right middle 2/2 60%; Ulises 3+4 left apex 2/2 50%, right base 1/1 60%, right apex 2/2 60%; Ulises 6 2/2 50%.  ADELE score- 5  NCCN- high risk    Lab Results   Component Value Date    PSA 4.4 (H) 08/14/2019    PSA 3.5 08/08/2018    PSA 3.3 08/08/2017    PSA 2.3 06/02/2016    PSA 0.80 11/05/2012    PSA 2.26 09/27/2012    PSA 1.1 07/15/2011    PSA 0.95 10/29/2009    PSA 0.74 10/08/2008    PSADIAG 3.0 11/07/2018    PSADIAG 2.8 11/01/2017    PSADIAG 2.3 10/26/2016    PSADIAG 2.2 09/17/2015    PSADIAG 1.9 09/17/2014    PSADIAG 1.2 09/04/2013      Past Medical History:   Diagnosis Date    Allergy     BPH (benign prostatic hypertrophy)     History of prostatitis     Hypertension      Family History   Problem Relation Age of Onset    Cataracts Mother     Hypertension Mother     Cataracts Father     Glaucoma Father     Hypertension Father     Blindness Other     Cancer Paternal Grandfather         lung/prostate    Diabetes Paternal Grandfather     Diabetes Maternal Grandmother     Heart disease Maternal Grandmother     Amblyopia Neg Hx     Strabismus Neg Hx     Retinal detachment Neg Hx     Macular degeneration Neg Hx     Stroke Neg Hx     Melanoma Neg Hx      Social History     Socioeconomic History    Marital status:      Spouse name: Not on file    Number of children: 3    Years of education: Not on file    Highest education level: Not on file   Occupational History    Occupation:    Social Needs     "Financial resource strain: Not on file    Food insecurity:     Worry: Not on file     Inability: Not on file    Transportation needs:     Medical: Not on file     Non-medical: Not on file   Tobacco Use    Smoking status: Former Smoker     Years: 35.00     Types: Cigarettes     Start date: 6/24/2013    Smokeless tobacco: Never Used    Tobacco comment: 6 cig a day   Substance and Sexual Activity    Alcohol use: Yes     Comment: socially/ not weekly    Drug use: No    Sexual activity: Yes     Partners: Female   Lifestyle    Physical activity:     Days per week: Not on file     Minutes per session: Not on file    Stress: Not on file   Relationships    Social connections:     Talks on phone: Not on file     Gets together: Not on file     Attends Rastafari service: Not on file     Active member of club or organization: Not on file     Attends meetings of clubs or organizations: Not on file     Relationship status: Not on file   Other Topics Concern    Not on file   Social History Narrative    Not on file     Past Surgical History:   Procedure Laterality Date    PROSTATE BIOPSY       Patient Active Problem List   Diagnosis    BPH with urinary obstruction    Cervical radiculopathy    Hypertension    Cervical neuropathy    Iliotibial band syndrome affecting lower leg    Anal fissure    Other hyperlipidemia    Prostate cancer     Review of Systems      Objective:      There were no vitals taken for this visit.  Estimated body mass index is 24.86 kg/m² as calculated from the following:    Height as of 10/17/19: 5' 7" (1.702 m).    Weight as of 10/17/19: 72 kg (158 lb 11.7 oz).  Physical Exam      Assessment and Plan:           Problem List Items Addressed This Visit     Prostate cancer - Primary          Follow up:   Today's visit was spent almost entirely on counseling. We reviewed his diagnosis, stage, grade, risk group, and prognosis. We discussed [place] (NCCN) and ADELE risk stratification  We " discussed the concept of low risk, intermediate risk, and high risk disease. We also reviewed the NCCN treatment nomogram.We discussed the different treatment options including active surveillance (as well as the surveillance protocol), prostate brachytherapy, IMRT, IGRT, cryotherapy, HIFU and both open and robotic prostatectomy.We also discussed the advantages, disadvantages, risks and benefits, as well as complications of each option. Regarding radiation therapy we discussed treatment planning, the different techniques, short and long term complications. These included radiation cystitis, radiation proctitis, and impotence. We discussed success, failure, and salvage therapeutic options.Also discussed the use of SpaceOAR for brachytherapy and EBRT and fiducials for EBRT.   We discussed surgical therapy in depth including preoperative preparation, surgical technique (including bladder neck and nerve-sparing techniques), postoperative recuperation and recovery, and short and long term complications including UTI, bleeding, blood clots,catheter dislodgement, etc. We discussed the risks of reoperation, incontinence, impotence, and recurrence. We discussed preop and postop Kegels, post op penile rehab, and treatment options for incontinence and impotence. We discussed rates of cancer free survival and recurrence, as well as salvage therapeutic options. We discussed the possible  indications for adjuvant radiation therapy.   I answered questions and addressed concerns. Also discussed the Prolaris test to get genetic information about this tumors potential future behavior.patient interested in robotic assisted laparoscopic prostatectomy, Prolaris.  My nurse will instruct the patient on Kegel exercises today and give them the Kegel exercise instruction sheet.   The patient will meet with our  Renee today to find a date for surgery and begin preparation for surgery. Will also receive our handout on NPO guidelines  and preop hydration. Patient will also receive information and education on preoperative skin preparation and postop wound care.   I spent 30 minutes with the patient. Over 50% of the visit was spent in counseling.     Jose Carlos Lawrence

## 2019-11-19 ENCOUNTER — ANESTHESIA EVENT (OUTPATIENT)
Dept: SURGERY | Facility: HOSPITAL | Age: 59
End: 2019-11-19
Payer: COMMERCIAL

## 2019-11-19 ENCOUNTER — TELEPHONE (OUTPATIENT)
Dept: UROLOGY | Facility: CLINIC | Age: 59
End: 2019-11-19

## 2019-11-19 DIAGNOSIS — Z01.818 PREOPERATIVE TESTING: Primary | ICD-10-CM

## 2019-11-19 DIAGNOSIS — C61 PROSTATE CANCER: Primary | ICD-10-CM

## 2019-11-19 NOTE — ANESTHESIA PREPROCEDURE EVALUATION
Heather Jones, RN   Registered Nurse      Pre Admission Screening   Signed                     Anesthesia Assessment: Preoperative EQUATION     Planned Procedure: Procedure(s) (LRB):  ROBOTIC PROSTATECTOMY (N/A)  Requested Anesthesia Type:General  Surgeon: Jose Carlos Lawrence MD  Service: Urology  Known or anticipated Date of Surgery:11/25/2019     Surgeon notes: reviewed     Previous anesthesia records:Not available     Last PCP note: within 3 months , within [facility]  Dr. Collins  Subspecialty notes: None     Other important co-morbidities: BPH, HTN/HLP     Tests already available:  Available tests,  3-6 months ago , outside [facility] .  8/14/2019 TSH, Lipid Panel, CMP, CBC                            Instructions given. (See in Nurse's note)     Optimization:  Anesthesia Preop Clinic Assessment  Indicated.                Plan:    Testing:  Hematology Profile, CMP, T&S and EKG   Pre-anesthesia  visit                                        Visit focus: concerns in complex and/or prolonged anesthesia, position other than supine                           Patient has previously scheduled Medical Appointment:  11/20/2019     Navigation: Tests Scheduled.                         Consults scheduled.                        Results will be tracked by Preop Clinic.            [facility]  Anesthesia Pre-Operative Evaluation         Patient Name: Nilton Chin  YOB: 1960  MRN: [id number]    SUBJECTIVE:     Pre-operative evaluation for Procedure(s) (LRB):  XI ROBOTIC PROSTATECTOMY (N/A)     11/24/2019    Nitlon Chin is a 59 y.o. male w/ a significant PMHx of HTN, HLD, cervical neuropathy and prostate cancer who presents for the above procedure.    Prev airway: None documented.     Patient Active Problem List   Diagnosis    BPH with urinary obstruction    Cervical radiculopathy    Hypertension    Cervical neuropathy    Iliotibial band syndrome affecting lower leg    Anal fissure    Other  hyperlipidemia    Prostate cancer       Review of patient's allergies indicates:   Allergen Reactions    Codeine Nausea Only    Vicodin [hydrocodone-acetaminophen]      unknown       Current Inpatient Medications:      No current facility-administered medications on file prior to encounter.      Current Outpatient Medications on File Prior to Encounter   Medication Sig Dispense Refill    fluticasone propionate (FLONASE) 50 mcg/actuation nasal spray 1 spray (50 mcg total) by Each Nostril route 2 (two) times daily. 1 Bottle 0    hyoscyamine (ANASPAZ,LEVSIN) 0.125 mg Tab Take 125 mcg by mouth every 4 (four) hours as needed.      ibuprofen (ADVIL,MOTRIN) 200 MG tablet Take 200 mg by mouth every 6 (six) hours as needed for Pain.      losartan-hydrochlorothiazide 50-12.5 mg (HYZAAR) 50-12.5 mg per tablet TAKE 1 TABLET BY MOUTH EVERY DAY 90 tablet 3    promethazine-dextromethorphan (PROMETHAZINE-DM) 6.25-15 mg/5 mL Syrp Take 5 mLs by mouth every 4 to 6 hours as needed. 5 mL every 4 to 6 hours; maximum: 30 mL in 24 hours 118 mL 0    tamsulosin (FLOMAX) 0.4 mg Cap TAKE 1 CAPSULE BY MOUTH EVERY DAY 90 capsule 3    gentamicin (GARAMYCIN) 40 mg/mL injection Inject into the muscle.      menthol (RICOLA) 1.1 mg Lozg by Mucous Membrane route.         Past Surgical History:   Procedure Laterality Date    PROSTATE BIOPSY         Social History     Socioeconomic History    Marital status:      Spouse name: Not on file    Number of children: 3    Years of education: Not on file    Highest education level: Not on file   Occupational History    Occupation:    Social Needs    Financial resource strain: Not on file    Food insecurity:     Worry: Not on file     Inability: Not on file    Transportation needs:     Medical: Not on file     Non-medical: Not on file   Tobacco Use    Smoking status: Former Smoker     Years: 35.00     Types: Cigarettes     Start date: 6/24/2013    Smokeless tobacco:  Never Used    Tobacco comment: 6 cig a day   Substance and Sexual Activity    Alcohol use: Yes     Comment: socially/ not weekly    Drug use: No    Sexual activity: Yes     Partners: Female   Lifestyle    Physical activity:     Days per week: Not on file     Minutes per session: Not on file    Stress: Not on file   Relationships    Social connections:     Talks on phone: Not on file     Gets together: Not on file     Attends Rastafari service: Not on file     Active member of club or organization: Not on file     Attends meetings of clubs or organizations: Not on file     Relationship status: Not on file   Other Topics Concern    Not on file   Social History Narrative    Not on file       OBJECTIVE:     Vital Signs Range (Last 24H):         CBC:   Recent Labs     19  1004   WBC 9.49   RBC 4.79   HGB 14.7   HCT 46.0      MCV 96   MCH 30.7   MCHC 32.0       CMP:   Recent Labs     19  1004      K 3.7      CO2 24   BUN 17   CREATININE 1.0   GLU 99   CALCIUM 9.9   ALBUMIN 4.3   PROT 7.8   ALKPHOS 73   ALT 16   AST 19   BILITOT 1.4*       INR:  No results for input(s): PT, INR, PROTIME, APTT in the last 72 hours.    Diagnostic Studies: No relevant studies.    EK19  NSR 78 bpm    2D ECHO:  No results found for this or any previous visit.      ASSESSMENT/PLAN:                                                                                               Anesthesia Evaluation    I have reviewed the Patient Summary Reports.    I have reviewed the Nursing Notes.   I have reviewed the Medications.     Review of Systems  Anesthesia Hx:  No problems with previous Anesthesia Tolerated MAC x2 for colonoscopy History of prior surgery of interest to airway management or planning: Family Hx of Anesthesia complications: Family Anesthesia Complications are Brother-HX of PONV   Social:  Former Smoker, Social Alcohol Use    Hematology/Oncology:  Hematology Normal       Prostate  Current/Recent Cancer.   EENT/Dental:   chronic allergic rhinitis   Cardiovascular:    Denies Angina. hyperlipidemia  Functional Capacity 5 METS  Hypertension , Well Controlled on Rx , Recent typical home B/P of 127/80, Recent typical clinic B/P of 140/81    Pulmonary:   Denies Shortness of breath.  Denies Recent URI.  Possible Obstructive Sleep Apnea , (STOP/BANG) Symptoms S - Snoring (loud), A - Age > 50, P - Pressure being treated for high BP and O - Observed interrupted breathing during sleep        Renal/:   BPH    Hepatic/GI:  Hepatic/GI Symptoms: abdominal pain.    Musculoskeletal:   Arthritis   Musculoskeletal General/Symptoms: neck pain. Functional capacity is unlimited.  Spine Disorders: cervical  Cervical Spine Disorder, Cervical Disc Disease 3 bulging discs   Neurological:  Neurology Normal    Endocrine:  Endocrine Normal    Psych:   anxiety          Physical Exam  General:  Well nourished    Airway/Jaw/Neck:  Airway Findings: Mouth Opening: Normal Tongue: Normal  Jaw/Neck Findings:  Neck ROM: Normal ROM      Dental:  Dental Findings: In tact   Chest/Lungs:  Chest/Lungs Findings: Clear to auscultation     Heart/Vascular:  Heart Findings: Rate: Normal  Sounds: Normal        Mental Status:  Mental Status Findings:  Cooperative, Alert and Oriented         Anesthesia Plan  Type of Anesthesia, risks & benefits discussed:  Anesthesia Type:  general  Patient's Preference:   Intra-op Monitoring Plan: standard ASA monitors  Intra-op Monitoring Plan Comments:   Post Op Pain Control Plan: per primary service following discharge from PACU, IV/PO Opioids PRN and multimodal analgesia  Post Op Pain Control Plan Comments:   Induction:   IV  Beta Blocker:  Patient is not currently on a Beta-Blocker (No further documentation required).       Informed Consent: Patient understands risks and agrees with Anesthesia plan.  Questions answered. Anesthesia consent signed with patient.  ASA Score: 2     Day of Surgery Review of  History & Physical:    H&P update referred to the surgeon.         Ready For Surgery From Anesthesia Perspective.

## 2019-11-19 NOTE — PRE ADMISSION SCREENING
Anesthesia Assessment: Preoperative EQUATION    Planned Procedure: Procedure(s) (LRB):  ROBOTIC PROSTATECTOMY (N/A)  Requested Anesthesia Type:General  Surgeon: Jose Carlos Lawrence MD  Service: Urology  Known or anticipated Date of Surgery:11/25/2019    Surgeon notes: reviewed    Previous anesthesia records:Not available    Last PCP note: within 3 months , within [facility]  Dr. Collins  Subspecialty notes: None    Other important co-morbidities: BPH, HTN/HLP     Tests already available:  Available tests,  3-6 months ago , outside [facility] .  8/14/2019 TSH, Lipid Panel, CMP, CBC            Instructions given. (See in Nurse's note)    Optimization:  Anesthesia Preop Clinic Assessment  Indicated.        Plan:    Testing:  Hematology Profile, CMP, T&S and EKG   Pre-anesthesia  visit       Visit focus: concerns in complex and/or prolonged anesthesia, position other than supine     Patient has previously scheduled Medical Appointment:  11/20/2019    Navigation: Tests Scheduled.              Consults scheduled.             Results will be tracked by Preop Clinic.

## 2019-11-20 ENCOUNTER — OFFICE VISIT (OUTPATIENT)
Dept: UROLOGY | Facility: CLINIC | Age: 59
End: 2019-11-20
Payer: COMMERCIAL

## 2019-11-20 DIAGNOSIS — C61 PROSTATE CANCER: Primary | ICD-10-CM

## 2019-11-20 PROCEDURE — 99999 PR PBB SHADOW E&M-EST. PATIENT-LVL II: CPT | Mod: PBBFAC,,,

## 2019-11-20 PROCEDURE — 99499 UNLISTED E&M SERVICE: CPT | Mod: S$GLB,,, | Performed by: UROLOGY

## 2019-11-20 PROCEDURE — 99499 NO LOS: ICD-10-PCS | Mod: S$GLB,,, | Performed by: UROLOGY

## 2019-11-20 PROCEDURE — 99999 PR PBB SHADOW E&M-EST. PATIENT-LVL II: ICD-10-PCS | Mod: PBBFAC,,,

## 2019-11-20 RX ORDER — ACETAMINOPHEN 325 MG/1
1000 TABLET ORAL ONCE
Status: CANCELLED | OUTPATIENT
Start: 2019-11-20 | End: 2019-11-20

## 2019-11-20 RX ORDER — HEPARIN SODIUM 5000 [USP'U]/ML
5000 INJECTION, SOLUTION INTRAVENOUS; SUBCUTANEOUS ONCE
Status: CANCELLED | OUTPATIENT
Start: 2019-11-20

## 2019-11-20 RX ORDER — PREGABALIN 25 MG/1
150 CAPSULE ORAL ONCE
Status: CANCELLED | OUTPATIENT
Start: 2019-11-20 | End: 2019-11-20

## 2019-11-20 RX ORDER — CELECOXIB 100 MG/1
400 CAPSULE ORAL ONCE
Status: CANCELLED | OUTPATIENT
Start: 2019-11-20 | End: 2019-11-20

## 2019-11-20 NOTE — H&P (VIEW-ONLY)
Urology ([facility]) H&P  Staff:  Jose Carlos Lawrence MD    CC: prostate adencarcinoma    HPI:  Nilton Chin is a 59 y.o. male with [id number] [place] 4+4=8 prostate adenocarcinoma.      The patient initially presented with elevated PSA.  PSA: 4.4  TRUS biopsy on 10/4/19 revealed the following:       LEFT   RIGHT  Base   Benign   3+4=7, 1/1, 30%  Mid   3+3=6, 2/2, 50% 4+4=8, 2/2, 60%  [place]   3+4=7, 2/2, 25% 3+4=7, 2/2, 60%    Percentage of total cores positive: 9/12 = 75%  Volume: 19.9 g  ADELE 5, intermediate risk  NCCN risk: High risk    MRI (11/12/19): PIRADS 5 lesion at PZ. No EPE. No NVB or SV invasion.    Voiding complaints: present  ED: absent  Incontinence: absent    No prior abdominal surgeries.    History of HTN. No cardiac or pulmonary disorder. No angina on exertion.    ROS:  Neg except per HPI, specifically no bone pain, no unintentional weight loss, no anorexia, no night sweats    Past Medical History:   Diagnosis Date    Allergy     BPH (benign prostatic hypertrophy)     History of prostatitis     Hypertension        Past Surgical History:   Procedure Laterality Date    PROSTATE BIOPSY         Social History     Socioeconomic History    Marital status:      Spouse name: Not on file    Number of children: 3    Years of education: Not on file    Highest education level: Not on file   Occupational History    Occupation:    Social Needs    Financial resource strain: Not on file    Food insecurity:     Worry: Not on file     Inability: Not on file    Transportation needs:     Medical: Not on file     Non-medical: Not on file   Tobacco Use    Smoking status: Former Smoker     Years: 35.00     Types: Cigarettes     Start date: 6/24/2013    Smokeless tobacco: Never Used    Tobacco comment: 6 cig a day   Substance and Sexual Activity    Alcohol use: Yes     Comment: socially/ not weekly    Drug use: No    Sexual activity: Yes     Partners: Female   Lifestyle    Physical  activity:     Days per week: Not on file     Minutes per session: Not on file    Stress: Not on file   Relationships    Social connections:     Talks on phone: Not on file     Gets together: Not on file     Attends Quaker service: Not on file     Active member of club or organization: Not on file     Attends meetings of clubs or organizations: Not on file     Relationship status: Not on file   Other Topics Concern    Not on file   Social History Narrative    Not on file       Family History   Problem Relation Age of Onset    Cataracts Mother     Hypertension Mother     Cataracts Father     Glaucoma Father     Hypertension Father     Blindness Other     Cancer Paternal Grandfather         lung/prostate    Diabetes Paternal Grandfather     Diabetes Maternal Grandmother     Heart disease Maternal Grandmother     Amblyopia Neg Hx     Strabismus Neg Hx     Retinal detachment Neg Hx     Macular degeneration Neg Hx     Stroke Neg Hx     Melanoma Neg Hx        Review of patient's allergies indicates:   Allergen Reactions    Codeine Nausea Only    Vicodin [hydrocodone-acetaminophen]      unknown       Current Outpatient Medications on File Prior to Visit   Medication Sig Dispense Refill    fluticasone propionate (FLONASE) 50 mcg/actuation nasal spray 1 spray (50 mcg total) by Each Nostril route 2 (two) times daily. 1 Bottle 0    gentamicin (GARAMYCIN) 40 mg/mL injection Inject into the muscle.      losartan-hydrochlorothiazide 50-12.5 mg (HYZAAR) 50-12.5 mg per tablet TAKE 1 TABLET BY MOUTH EVERY DAY 90 tablet 3    menthol (RICOLA) 1.1 mg Lozg by Mucous Membrane route.      promethazine-dextromethorphan (PROMETHAZINE-DM) 6.25-15 mg/5 mL Syrp Take 5 mLs by mouth every 4 to 6 hours as needed. 5 mL every 4 to 6 hours; maximum: 30 mL in 24 hours 118 mL 0    tamsulosin (FLOMAX) 0.4 mg Cap TAKE 1 CAPSULE BY MOUTH EVERY DAY 90 capsule 3     No current facility-administered medications on file prior to  "visit.        Anticoagulation:  No    Physical Exam:    Estimated body mass index is 24.52 kg/m² as calculated from the following:    Height as of 10/17/19: 5' 7" (1.702 m).    Weight as of 11/14/19: 71 kg (156 lb 8.4 oz).    General: No acute distress, well developed. AAOx3  Head: Normocephalic, Atraumatic  Eyes: Extra-occular movements intact, No discharge  Neck: supple, symmetrical, trachea midline  Lungs: normal respiratory effort, no respiratory distress, no wheezes  CV: regular rate, 2+ pulses  Abdomen: soft, non-tender, non-distended, no organomegaly  MSK: no edema, no deformities, normal ROM  Skin: skin color, texture, turgor normal.  Neurologic: no focal deficits, sensation intact    Labs:  Urine dipstick today shows positive for trace blood. Negative for all other tested components.    Lab Results   Component Value Date    WBC 4.87 08/14/2019    HGB 13.9 (L) 08/14/2019    HCT 43.1 08/14/2019    MCV 96 08/14/2019     08/14/2019           BMP  Lab Results   Component Value Date     08/14/2019    K 4.7 08/14/2019     08/14/2019    CO2 28 08/14/2019    BUN 16 08/14/2019    CREATININE 1.0 10/17/2019    CALCIUM 9.6 08/14/2019    ANIONGAP 9 08/14/2019    ESTGFRAFRICA >60.0 10/17/2019    EGFRNONAA >60.0 10/17/2019       Lab Results   Component Value Date    PSA 4.4 (H) 08/14/2019    PSA 3.5 08/08/2018    PSA 3.3 08/08/2017    PSA 2.3 06/02/2016    PSA 0.80 11/05/2012    PSA 2.26 09/27/2012    PSA 1.1 07/15/2011    PSA 0.95 10/29/2009    PSA 0.74 10/08/2008    PSADIAG 3.0 11/07/2018    PSADIAG 2.8 11/01/2017    PSADIAG 2.3 10/26/2016    PSADIAG 2.2 09/17/2015    PSADIAG 1.9 09/17/2014    PSADIAG 1.2 09/04/2013       Imaging:  mpMRI prostate (11/12/19): PIRADS 5 lesion at PZ. No EPE. No NVB or SV invasion.    Assessment: Nilton Chin is a 59 y.o. male with [id number] [place] 4+4=8 prostate adenocarcinoma.      Plan:   1. To OR on 11/25/19 for RALP with BPLND  2. Consents signed   3. I have " explained the risk, benefits, and alternatives of the procedure in detail. The patient voices understanding and all questions have been answered. Thedfg patient agrees to proceed as planned.   4. Type and screen ordered  5. Heparin Pre-op  6. F/u CBC, CMP tomorrow 11/21       Russell Henson MD

## 2019-11-20 NOTE — PROGRESS NOTES
Urology ([facility]) H&P  Staff:  Jose Carlos Lawrence MD    CC: prostate adencarcinoma    HPI:  Nilton Chin is a 59 y.o. male with [id number] [place] 4+4=8 prostate adenocarcinoma.      The patient initially presented with elevated PSA.  PSA: 4.4  TRUS biopsy on 10/4/19 revealed the following:       LEFT   RIGHT  Base   Benign   3+4=7, 1/1, 30%  Mid   3+3=6, 2/2, 50% 4+4=8, 2/2, 60%  [place]   3+4=7, 2/2, 25% 3+4=7, 2/2, 60%    Percentage of total cores positive: 9/12 = 75%  Volume: 19.9 g  ADELE 5, intermediate risk  NCCN risk: High risk    MRI (11/12/19): PIRADS 5 lesion at PZ. No EPE. No NVB or SV invasion.    Voiding complaints: present  ED: absent  Incontinence: absent    No prior abdominal surgeries.    History of HTN. No cardiac or pulmonary disorder. No angina on exertion.    ROS:  Neg except per HPI, specifically no bone pain, no unintentional weight loss, no anorexia, no night sweats    Past Medical History:   Diagnosis Date    Allergy     BPH (benign prostatic hypertrophy)     History of prostatitis     Hypertension        Past Surgical History:   Procedure Laterality Date    PROSTATE BIOPSY         Social History     Socioeconomic History    Marital status:      Spouse name: Not on file    Number of children: 3    Years of education: Not on file    Highest education level: Not on file   Occupational History    Occupation:    Social Needs    Financial resource strain: Not on file    Food insecurity:     Worry: Not on file     Inability: Not on file    Transportation needs:     Medical: Not on file     Non-medical: Not on file   Tobacco Use    Smoking status: Former Smoker     Years: 35.00     Types: Cigarettes     Start date: 6/24/2013    Smokeless tobacco: Never Used    Tobacco comment: 6 cig a day   Substance and Sexual Activity    Alcohol use: Yes     Comment: socially/ not weekly    Drug use: No    Sexual activity: Yes     Partners: Female   Lifestyle    Physical  activity:     Days per week: Not on file     Minutes per session: Not on file    Stress: Not on file   Relationships    Social connections:     Talks on phone: Not on file     Gets together: Not on file     Attends Sikhism service: Not on file     Active member of club or organization: Not on file     Attends meetings of clubs or organizations: Not on file     Relationship status: Not on file   Other Topics Concern    Not on file   Social History Narrative    Not on file       Family History   Problem Relation Age of Onset    Cataracts Mother     Hypertension Mother     Cataracts Father     Glaucoma Father     Hypertension Father     Blindness Other     Cancer Paternal Grandfather         lung/prostate    Diabetes Paternal Grandfather     Diabetes Maternal Grandmother     Heart disease Maternal Grandmother     Amblyopia Neg Hx     Strabismus Neg Hx     Retinal detachment Neg Hx     Macular degeneration Neg Hx     Stroke Neg Hx     Melanoma Neg Hx        Review of patient's allergies indicates:   Allergen Reactions    Codeine Nausea Only    Vicodin [hydrocodone-acetaminophen]      unknown       Current Outpatient Medications on File Prior to Visit   Medication Sig Dispense Refill    fluticasone propionate (FLONASE) 50 mcg/actuation nasal spray 1 spray (50 mcg total) by Each Nostril route 2 (two) times daily. 1 Bottle 0    gentamicin (GARAMYCIN) 40 mg/mL injection Inject into the muscle.      losartan-hydrochlorothiazide 50-12.5 mg (HYZAAR) 50-12.5 mg per tablet TAKE 1 TABLET BY MOUTH EVERY DAY 90 tablet 3    menthol (RICOLA) 1.1 mg Lozg by Mucous Membrane route.      promethazine-dextromethorphan (PROMETHAZINE-DM) 6.25-15 mg/5 mL Syrp Take 5 mLs by mouth every 4 to 6 hours as needed. 5 mL every 4 to 6 hours; maximum: 30 mL in 24 hours 118 mL 0    tamsulosin (FLOMAX) 0.4 mg Cap TAKE 1 CAPSULE BY MOUTH EVERY DAY 90 capsule 3     No current facility-administered medications on file prior to  "visit.        Anticoagulation:  No    Physical Exam:    Estimated body mass index is 24.52 kg/m² as calculated from the following:    Height as of 10/17/19: 5' 7" (1.702 m).    Weight as of 11/14/19: 71 kg (156 lb 8.4 oz).    General: No acute distress, well developed. AAOx3  Head: Normocephalic, Atraumatic  Eyes: Extra-occular movements intact, No discharge  Neck: supple, symmetrical, trachea midline  Lungs: normal respiratory effort, no respiratory distress, no wheezes  CV: regular rate, 2+ pulses  Abdomen: soft, non-tender, non-distended, no organomegaly  MSK: no edema, no deformities, normal ROM  Skin: skin color, texture, turgor normal.  Neurologic: no focal deficits, sensation intact    Labs:  Urine dipstick today shows positive for trace blood. Negative for all other tested components.    Lab Results   Component Value Date    WBC 4.87 08/14/2019    HGB 13.9 (L) 08/14/2019    HCT 43.1 08/14/2019    MCV 96 08/14/2019     08/14/2019           BMP  Lab Results   Component Value Date     08/14/2019    K 4.7 08/14/2019     08/14/2019    CO2 28 08/14/2019    BUN 16 08/14/2019    CREATININE 1.0 10/17/2019    CALCIUM 9.6 08/14/2019    ANIONGAP 9 08/14/2019    ESTGFRAFRICA >60.0 10/17/2019    EGFRNONAA >60.0 10/17/2019       Lab Results   Component Value Date    PSA 4.4 (H) 08/14/2019    PSA 3.5 08/08/2018    PSA 3.3 08/08/2017    PSA 2.3 06/02/2016    PSA 0.80 11/05/2012    PSA 2.26 09/27/2012    PSA 1.1 07/15/2011    PSA 0.95 10/29/2009    PSA 0.74 10/08/2008    PSADIAG 3.0 11/07/2018    PSADIAG 2.8 11/01/2017    PSADIAG 2.3 10/26/2016    PSADIAG 2.2 09/17/2015    PSADIAG 1.9 09/17/2014    PSADIAG 1.2 09/04/2013       Imaging:  mpMRI prostate (11/12/19): PIRADS 5 lesion at PZ. No EPE. No NVB or SV invasion.    Assessment: Nilton Chin is a 59 y.o. male with [id number] [place] 4+4=8 prostate adenocarcinoma.      Plan:   1. To OR on 11/25/19 for RALP with BPLND  2. Consents signed   3. I have " explained the risk, benefits, and alternatives of the procedure in detail. The patient voices understanding and all questions have been answered. Thedfg patient agrees to proceed as planned.   4. Type and screen ordered  5. Heparin Pre-op  6. F/u CBC, CMP tomorrow 11/21       Russell Henson MD

## 2019-11-21 ENCOUNTER — HOSPITAL ENCOUNTER (OUTPATIENT)
Dept: PREADMISSION TESTING | Facility: HOSPITAL | Age: 59
Discharge: HOME | End: 2019-11-21
Attending: ANESTHESIOLOGY
Payer: COMMERCIAL

## 2019-11-21 ENCOUNTER — HOSPITAL ENCOUNTER (OUTPATIENT)
Dept: CARDIOLOGY | Facility: CLINIC | Age: 59
Discharge: HOME | End: 2019-11-21
Attending: ANESTHESIOLOGY
Payer: COMMERCIAL

## 2019-11-21 VITALS
RESPIRATION RATE: 16 BRPM | HEIGHT: 67 IN | OXYGEN SATURATION: 99 % | TEMPERATURE: 99 F | WEIGHT: 156 LBS | DIASTOLIC BLOOD PRESSURE: 81 MMHG | BODY MASS INDEX: 24.48 KG/M2 | HEART RATE: 88 BPM | SYSTOLIC BLOOD PRESSURE: 140 MMHG

## 2019-11-21 DIAGNOSIS — Z01.818 PREOPERATIVE TESTING: ICD-10-CM

## 2019-11-21 PROCEDURE — 93010 EKG 12-LEAD: ICD-10-PCS | Mod: S$GLB,,, | Performed by: INTERNAL MEDICINE

## 2019-11-21 PROCEDURE — 93010 ELECTROCARDIOGRAM REPORT: CPT | Mod: S$GLB,,, | Performed by: INTERNAL MEDICINE

## 2019-11-21 PROCEDURE — 93005 EKG 12-LEAD: ICD-10-PCS | Mod: S$GLB,,, | Performed by: ANESTHESIOLOGY

## 2019-11-21 PROCEDURE — 93005 ELECTROCARDIOGRAM TRACING: CPT | Mod: S$GLB,,, | Performed by: ANESTHESIOLOGY

## 2019-11-21 RX ORDER — HYOSCYAMINE SULFATE 0.125 MG
125 TABLET ORAL EVERY 4 HOURS PRN
COMMUNITY
End: 2019-12-03 | Stop reason: ALTCHOICE

## 2019-11-21 RX ORDER — IBUPROFEN 200 MG
200 TABLET ORAL EVERY 6 HOURS PRN
COMMUNITY
End: 2020-01-16

## 2019-11-21 NOTE — DISCHARGE INSTRUCTIONS
Your surgery has been scheduled for:__________________________________________    You should report to:  ____[facility], located on the [place] side of the first floor of the           [facility] ([phone])  ____[facility], located on the [facility] side of the            Second floor of the [facility] ([phone])  ____3rd Floor [facility] located on the [facility] side of the [facility] [phone]  Please Note   - Tell your doctor if you take Aspirin, products containing Aspirin, herbal medications  or blood thinners, such as Coumadin, Ticlid, or Plavix.  (Consult your provider regarding holding or stopping before surgery).  - Arrange for someone to drive you home following surgery.  You will not be allowed to leave the surgical facility alone or drive yourself home following sedation and anesthesia.  Before Surgery  - Stop taking all herbal medications 14days prior to surgery  - No Motrin/Advil (Ibuprofen) 7 days before surgery  - No Aleve (Naproxen) 7 days before surgery  - Stop Taking Asprin, products containing Asprin _____days before surgery  - Stop taking blood thinners_______days before surgery  - No Goody's/BC  Powder 7 days before surgery  - Refrain from drinking alcoholic beverages for 24hours before and after surgery  - Stop or limit smoking _________days before surgery  - You may take Tylenol for pain  Night before Surgery  Stop ALL solid food, gum, candy (including vitamins) 8 hours before arrival time.  (Please note: If your surgeon gives you different eating and drinking instructions, please follow surgeon's directions.)  Stop all CLOUDY liquids: coffee with creamer, formula, tube feeds, cloudy juices, non-human milk and breast milk with additives, 6 hours prior to arrival time.  Stop plain breast milk 4 hours prior to arrival time.  The patient should be ENCOURAGED to drink carbohydrate-rich  clear liquids (sports drinks, clear juices) until 2 hours prior to arrival time.  CLEAR liquids include only water, black coffee NO creamer, clear oral rehydration drinks, clear sports drinks or clear fruit juices (no orange juice, no pulpy juices, no apple cider). Advise patients if they can read newsprint through the liquid, it qualifies as clear liquid.   IF IN DOUBT, drink water instead.   - Take a shower or bath (shower is recommended).  Bathe with Hibiclens soap or an antibacterial soap from the neck down.  If not supplied by your surgeon, hibiclens soap will need to be purchased over the counter in pharmacy.  Rinse soap off thoroughly.  - Shampoo your hair with your regular shampoo  The Day of Surgery  · NOTHING TO  DRINK 2 hours before arrival time. If you are told to take medication on the morning of surgery, it may be taken with a sip of water.   - Take another bath or shower with hibiclens or any antibacterial soap, to reduce the chance of infection.  - Take heart and blood pressure medications with a small sip of water, as advised by the perioperative team.  - Do not take fluid pills  - You may brush your teeth and rinse your mouth, but do not swall any additional water.   - Do not apply perfumes, powder, body lotions or deodorant on the day of surgery.  - Nail polish should be removed.  - Do not wear makeup or moisturizer  - Wear comfortable clothes, such as a button front shirt and loose fitting pants.  - Leave all jewelry, including body piercings, and valuables at home.    - Bring any devices you will neeed after surgery such as crutches or canes.  - If you have sleep apnea, please bring your CPAP machine  In the event that your physical condition changes including the onset of a cold or respiratory illness, or if you have to delay or cancel your surgery, please notify your surgeon.  Anesthesia: General Anesthesia     You are watched continuously during your procedure by your anesthesia provider.      Youre due to have surgery. During surgery, youll be given medicine called anesthesia or anesthetic. This will keep you comfortable and pain-free. Your anesthesia provider will use general anesthesia.  What is general anesthesia?  General anesthesia puts you into a state like deep sleep. It goes into the bloodstream (IV anesthetics), into the lungs (gas anesthetics), or both. You feel nothing during the procedure. You will not remember it. During the procedure, the anesthesia provider monitors you continuously. He or she checks your heart rate and rhythm, blood pressure, breathing, and blood oxygen.  · IV anesthetics. IV anesthetics are given through an IV line in your arm. Theyre often given first. This is so you are asleep before a gas anesthetic is started. Some kinds of IV anesthetics relieve pain. Others relax you. Your doctor will decide which kind is best in your case.  · Gas anesthetics. Gas anesthetics are breathed into the lungs. They are often used to keep you asleep. They can be given through a facemask or a tube placed in your larynx or trachea (breathing tube).  ? If you have a facemask, your anesthesia provider will most likely place it over your nose and mouth while youre still awake. Youll breathe oxygen through the mask as your IV anesthetic is started. Gas anesthetic may be added through the mask.  ? If you have a tube in the larynx or trachea, it will be inserted into your throat after youre asleep.  Anesthesia tools and medicines  You will likely have:  · IV anesthetics. These are put into an IV line into your bloodstream.  · Gas anesthetics. You breathe these anesthetics into your lungs, where they pass into your bloodstream.  · Pulse oximeter. This is a small clip that is attached to the end of your finger. This measures your blood oxygen level.  · Electrocardiography leads (electrodes). These are small sticky pads that are placed on your chest. They record your heart rate and  rhythm.  · Blood pressure cuff. This reads your blood pressure.  Risks and possible complications  General anesthesia has some risks. These include:  · Breathing problems  · Nausea and vomiting  · Sore throat or hoarseness (usually temporary)  · Allergic reaction to the anesthetic  · Irregular heartbeat (rare)  · Cardiac arrest (rare)   Anesthesia safety  · Follow all instructions you are given for how long not to eat or drink before your procedure.  · Be sure your doctor knows what medicines and drugs you take. This includes over-the-counter medicines, herbs, supplements, alcohol or other drugs. You will be asked when those were last taken.  · Have an adult family member or friend drive you home after the procedure.  · For the first 24 hours after your surgery:  ? Do not drive or use heavy equipment.  ? Do not make important decisions or sign legal documents. If important decisions or signing legal documents is necessary during the first 24 hours after surgery, have a trusted family member or spouse act on your behalf.  ? Avoid alcohol.  ? Have a responsible adult stay with you. He or she can watch for problems and help keep you safe.  Date Last Reviewed: 12/1/2016 © 2000-2017 [employer]. [address] [place] [address]. All rights reserved. This information is not intended as a substitute for professional medical care. Always follow your healthcare professional's instructions  Anesthesia: Regional Anesthesia    Youre scheduled for surgery. During surgery, youll receive medicine called anesthesia to keep you comfortable and pain-free. Your surgeon has decided that youll receive regional anesthesia. This sheet tells you what to expect with this type of anesthesia.  What is regional anesthesia?  Regional anesthesia numbs one region of your body. The anesthesia may be given around nerves or into veins in your arms, neck, or legs (nerve block or Rebekah block). Or it may be sent into the  spinal fluid (spinal anesthesia) or into the space just outside the spinal fluid (epidural anesthesia). You may also be given sedatives to help you relax.  Nerve block or Rebekah block  A small area of the body, such as an arm or leg, can be numbed using a nerve block or Rebekah block.  · Nerve block. During a nerve block, your skin is numbed. A needle is then inserted near nerves that serve the area to be numbed. Anesthetic is sent through the needle.  · IV regional or Ali Chukson block. For this type of block, an IV line is put into a vein. The blood flow to the area to be numbed is blocked for a short time. Anesthetic is sent through the IV.  Spinal anesthesia  Spinal anesthesia numbs your body from about the waist down.  · Anesthetic is injected into the spinal fluid. This is a substance that surrounds the spinal cord in your spinal column. The anesthetic blocks pain traveling from the body to the brain.  · To receive the anesthetic, your skin is numbed at the injection site on your back.  · A needle is then inserted into the spinal space. Anesthetic is sent into the spinal fluid through the needle.  Epidural anesthesia  Epidural anesthesia is most commonly used during childbirth and may also be used after surgical procedures of the chest, belly, and legs.  · Anesthetic is injected into the epidural space. This is just outside the dural sac which contains the spinal fluid.  · To receive the anesthetic, your skin is numbed at the injection site on your back.  · A needle is then inserted into the epidural space. Anesthetic is sent into the epidural space through the needle.  · A small flexible catheter may be attached to the needle and left in place. This allows for continuous injections or infusions of anesthetic.  Anesthesia tools and medicines that might be near you during your procedure  · Local anesthetic. This medicine is given through a needle numbs one region of your body.  · Electrocardiography leads (electrodes).  These are used to record your heart rate and rhythm.  · Blood pressure cuff. A cuff is placed on your arm to keep track of your blood pressure.  · Pulse oximeter. This small clip is placed on the end of the finger. It measures your blood oxygen level.  · Sedatives. These medicines may be given through an IV. They help to relax you and keep you comfortable. You may stay awake or sleep lightly.  · Oxygen. You may be given oxygen through a facemask.  Risks and possible complications  Regional anesthesia carries some risks. These include:  · Nausea and vomiting  · Headache  · Backache  · Decreased blood pressure  · Allergic reaction to the anesthetic  · Ongoing numbness (rare)  · Irregular heartbeat (rare)  · Cardiac arrest (rare)   Date Last Reviewed: 12/1/2016  © 6703-4263 [employer], [employer]. [address] [place] [address], [place] [address]. All rights reserved. This information is not intended as a substitute for professional medical care. Always follow your healthcare professional's instructions.

## 2019-11-22 ENCOUNTER — TELEPHONE (OUTPATIENT)
Dept: UROLOGY | Facility: CLINIC | Age: 59
End: 2019-11-22

## 2019-11-22 NOTE — TELEPHONE ENCOUNTER
Called patient to confirm surgery with Dr. Lawrence on 11/25/2019. Give arrival time of 5am and instructions as follows.    Pt should be on clear liquids the whole day prior to surgery.  Pt should do 2 rectal fleet enemas anytime on the day prior to surgery. Pt must take a shower or bath with Hibiclens soap the night before surgery and also the morning of surgery.  The pt must also drink a 12oz bottle of Gatorade or Powerade 2 to 3 hours prior to the arrival time of surgery.  Also, pt was instructed to continue Kegel exercises.  Pt verbalized understanding.

## 2019-11-25 ENCOUNTER — HOSPITAL ENCOUNTER (OUTPATIENT)
Facility: HOSPITAL | Age: 59
LOS: 1 days | Discharge: HOME | End: 2019-11-26
Attending: UROLOGY | Admitting: UROLOGY
Payer: COMMERCIAL

## 2019-11-25 ENCOUNTER — ANESTHESIA (OUTPATIENT)
Dept: SURGERY | Facility: HOSPITAL | Age: 59
End: 2019-11-25
Payer: COMMERCIAL

## 2019-11-25 DIAGNOSIS — C61 PROSTATE CANCER: ICD-10-CM

## 2019-11-25 PROCEDURE — 76942 ECHO GUIDE FOR BIOPSY: CPT | Performed by: STUDENT IN AN ORGANIZED HEALTH CARE EDUCATION/TRAINING PROGRAM

## 2019-11-25 PROCEDURE — 64461 ERECTOR SPINAE PLANE SINGLE INJECTION BLOCK: ICD-10-PCS | Mod: 59,50,, | Performed by: ANESTHESIOLOGY

## 2019-11-25 PROCEDURE — 25000003 PHARM REV CODE 250: Performed by: STUDENT IN AN ORGANIZED HEALTH CARE EDUCATION/TRAINING PROGRAM

## 2019-11-25 PROCEDURE — G0378 HOSPITAL OBSERVATION PER HR: HCPCS

## 2019-11-25 PROCEDURE — D9220A PRA ANESTHESIA: Mod: ,,, | Performed by: ANESTHESIOLOGY

## 2019-11-25 PROCEDURE — 63600175 PHARM REV CODE 636 W HCPCS: Performed by: ANESTHESIOLOGY

## 2019-11-25 PROCEDURE — 88307 TISSUE EXAM BY PATHOLOGIST: CPT | Performed by: PATHOLOGY

## 2019-11-25 PROCEDURE — 88309 TISSUE EXAM BY PATHOLOGIST: CPT | Mod: 26,,, | Performed by: PATHOLOGY

## 2019-11-25 PROCEDURE — 63600175 PHARM REV CODE 636 W HCPCS: Performed by: STUDENT IN AN ORGANIZED HEALTH CARE EDUCATION/TRAINING PROGRAM

## 2019-11-25 PROCEDURE — 88309 PR  SURG PATH,LEVEL VI: ICD-10-PCS | Mod: 26,,, | Performed by: PATHOLOGY

## 2019-11-25 PROCEDURE — 94799 UNLISTED PULMONARY SVC/PX: CPT

## 2019-11-25 PROCEDURE — 37000009 HC ANESTHESIA EA ADD 15 MINS: Performed by: UROLOGY

## 2019-11-25 PROCEDURE — 88305 TISSUE EXAM BY PATHOLOGIST: CPT | Mod: 59 | Performed by: PATHOLOGY

## 2019-11-25 PROCEDURE — C1729 CATH, DRAINAGE: HCPCS | Performed by: UROLOGY

## 2019-11-25 PROCEDURE — D9220A PRA ANESTHESIA: ICD-10-PCS | Mod: ,,, | Performed by: ANESTHESIOLOGY

## 2019-11-25 PROCEDURE — 37000008 HC ANESTHESIA 1ST 15 MINUTES: Performed by: UROLOGY

## 2019-11-25 PROCEDURE — 88305 TISSUE EXAM BY PATHOLOGIST: ICD-10-PCS | Mod: 26,,, | Performed by: PATHOLOGY

## 2019-11-25 PROCEDURE — 55866 PR LAP,PROSTATECTOMY,RADICAL,W/NERVE SPARE,INCL ROBOTIC: ICD-10-PCS | Mod: ,,, | Performed by: UROLOGY

## 2019-11-25 PROCEDURE — 27201423 OPTIME MED/SURG SUP & DEVICES STERILE SUPPLY: Performed by: UROLOGY

## 2019-11-25 PROCEDURE — 64461 PVB THORACIC SINGLE INJ SITE: CPT | Mod: 59,50,, | Performed by: ANESTHESIOLOGY

## 2019-11-25 PROCEDURE — 88305 TISSUE EXAM BY PATHOLOGIST: CPT | Mod: 26,,, | Performed by: PATHOLOGY

## 2019-11-25 PROCEDURE — 94761 N-INVAS EAR/PLS OXIMETRY MLT: CPT

## 2019-11-25 PROCEDURE — 27201037 HC PRESSURE MONITORING SET UP

## 2019-11-25 PROCEDURE — S0020 INJECTION, BUPIVICAINE HYDRO: HCPCS | Performed by: ANESTHESIOLOGY

## 2019-11-25 PROCEDURE — 71000033 HC RECOVERY, INTIAL HOUR: Performed by: UROLOGY

## 2019-11-25 PROCEDURE — 36000712 HC OR TIME LEV V 1ST 15 MIN: Performed by: UROLOGY

## 2019-11-25 PROCEDURE — 38571 LAPAROSCOPY LYMPHADENECTOMY: CPT | Mod: 51,,, | Performed by: UROLOGY

## 2019-11-25 PROCEDURE — 38571 PR LAP,PELVIC LYMPHADENECTOMY: ICD-10-PCS | Mod: 51,,, | Performed by: UROLOGY

## 2019-11-25 PROCEDURE — 64461 PVB THORACIC SINGLE INJ SITE: CPT | Mod: 59 | Performed by: STUDENT IN AN ORGANIZED HEALTH CARE EDUCATION/TRAINING PROGRAM

## 2019-11-25 PROCEDURE — 25000003 PHARM REV CODE 250: Performed by: ANESTHESIOLOGY

## 2019-11-25 PROCEDURE — 88309 TISSUE EXAM BY PATHOLOGIST: CPT | Performed by: PATHOLOGY

## 2019-11-25 PROCEDURE — 55866 LAPS SURG PRST8ECT RPBIC RAD: CPT | Mod: ,,, | Performed by: UROLOGY

## 2019-11-25 PROCEDURE — 36000713 HC OR TIME LEV V EA ADD 15 MIN: Performed by: UROLOGY

## 2019-11-25 RX ORDER — ONDANSETRON 8 MG/1
8 TABLET, ORALLY DISINTEGRATING ORAL EVERY 6 HOURS PRN
Status: DISCONTINUED | OUTPATIENT
Start: 2019-11-25 | End: 2019-11-25

## 2019-11-25 RX ORDER — PANTOPRAZOLE SODIUM 40 MG/1
40 TABLET, DELAYED RELEASE ORAL DAILY
Status: DISCONTINUED | OUTPATIENT
Start: 2019-11-25 | End: 2019-11-26 | Stop reason: HOSPADM

## 2019-11-25 RX ORDER — SODIUM CHLORIDE 9 MG/ML
INJECTION, SOLUTION INTRAVENOUS CONTINUOUS
Status: DISCONTINUED | OUTPATIENT
Start: 2019-11-25 | End: 2019-11-26 | Stop reason: HOSPADM

## 2019-11-25 RX ORDER — SODIUM CHLORIDE 9 MG/ML
INJECTION, SOLUTION INTRAVENOUS CONTINUOUS PRN
Status: DISCONTINUED | OUTPATIENT
Start: 2019-11-25 | End: 2019-11-25

## 2019-11-25 RX ORDER — ACETAMINOPHEN 500 MG
1000 TABLET ORAL EVERY 6 HOURS
Status: DISCONTINUED | OUTPATIENT
Start: 2019-11-25 | End: 2019-11-26 | Stop reason: HOSPADM

## 2019-11-25 RX ORDER — ROCURONIUM BROMIDE 10 MG/ML
INJECTION, SOLUTION INTRAVENOUS
Status: DISCONTINUED | OUTPATIENT
Start: 2019-11-25 | End: 2019-11-25

## 2019-11-25 RX ORDER — MIDAZOLAM HYDROCHLORIDE 1 MG/ML
0.5 INJECTION INTRAMUSCULAR; INTRAVENOUS
Status: DISCONTINUED | OUTPATIENT
Start: 2019-11-25 | End: 2019-11-25

## 2019-11-25 RX ORDER — HALOPERIDOL 5 MG/ML
0.5 INJECTION INTRAMUSCULAR EVERY 4 HOURS PRN
Status: DISCONTINUED | OUTPATIENT
Start: 2019-11-25 | End: 2019-11-25

## 2019-11-25 RX ORDER — PHENYLEPHRINE HYDROCHLORIDE 10 MG/ML
INJECTION INTRAVENOUS
Status: DISCONTINUED | OUTPATIENT
Start: 2019-11-25 | End: 2019-11-25

## 2019-11-25 RX ORDER — DEXAMETHASONE SODIUM PHOSPHATE 4 MG/ML
INJECTION, SOLUTION INTRA-ARTICULAR; INTRALESIONAL; INTRAMUSCULAR; INTRAVENOUS; SOFT TISSUE
Status: DISCONTINUED | OUTPATIENT
Start: 2019-11-25 | End: 2019-11-25

## 2019-11-25 RX ORDER — HEPARIN SODIUM 5000 [USP'U]/ML
5000 INJECTION, SOLUTION INTRAVENOUS; SUBCUTANEOUS ONCE
Status: COMPLETED | OUTPATIENT
Start: 2019-11-25 | End: 2019-11-25

## 2019-11-25 RX ORDER — PREGABALIN 150 MG/1
150 CAPSULE ORAL ONCE
Status: COMPLETED | OUTPATIENT
Start: 2019-11-25 | End: 2019-11-25

## 2019-11-25 RX ORDER — FENTANYL CITRATE 50 UG/ML
INJECTION, SOLUTION INTRAMUSCULAR; INTRAVENOUS
Status: DISCONTINUED | OUTPATIENT
Start: 2019-11-25 | End: 2019-11-25

## 2019-11-25 RX ORDER — SODIUM CHLORIDE 0.9 % (FLUSH) 0.9 %
10 SYRINGE (ML) INJECTION
Status: DISCONTINUED | OUTPATIENT
Start: 2019-11-25 | End: 2019-11-26 | Stop reason: HOSPADM

## 2019-11-25 RX ORDER — BUPIVACAINE HYDROCHLORIDE 7.5 MG/ML
INJECTION, SOLUTION EPIDURAL; RETROBULBAR
Status: COMPLETED | OUTPATIENT
Start: 2019-11-25 | End: 2019-11-25

## 2019-11-25 RX ORDER — LIDOCAINE HCL/PF 100 MG/5ML
SYRINGE (ML) INTRAVENOUS
Status: DISCONTINUED | OUTPATIENT
Start: 2019-11-25 | End: 2019-11-25

## 2019-11-25 RX ORDER — PREGABALIN 50 MG/1
150 CAPSULE ORAL NIGHTLY
Status: DISCONTINUED | OUTPATIENT
Start: 2019-11-25 | End: 2019-11-26 | Stop reason: HOSPADM

## 2019-11-25 RX ORDER — LOSARTAN POTASSIUM AND HYDROCHLOROTHIAZIDE 12.5; 5 MG/1; MG/1
1 TABLET ORAL DAILY
Status: DISCONTINUED | OUTPATIENT
Start: 2019-11-25 | End: 2019-11-26 | Stop reason: HOSPADM

## 2019-11-25 RX ORDER — KETAMINE HYDROCHLORIDE 100 MG/ML
INJECTION, SOLUTION INTRAMUSCULAR; INTRAVENOUS
Status: DISCONTINUED | OUTPATIENT
Start: 2019-11-25 | End: 2019-11-25

## 2019-11-25 RX ORDER — OXYCODONE HYDROCHLORIDE 5 MG/1
5 TABLET ORAL EVERY 4 HOURS PRN
Status: DISCONTINUED | OUTPATIENT
Start: 2019-11-25 | End: 2019-11-26 | Stop reason: HOSPADM

## 2019-11-25 RX ORDER — FENTANYL CITRATE 50 UG/ML
25 INJECTION, SOLUTION INTRAMUSCULAR; INTRAVENOUS EVERY 5 MIN PRN
Status: DISCONTINUED | OUTPATIENT
Start: 2019-11-25 | End: 2019-11-25

## 2019-11-25 RX ORDER — ACETAMINOPHEN 500 MG
1000 TABLET ORAL ONCE
Status: COMPLETED | OUTPATIENT
Start: 2019-11-25 | End: 2019-11-25

## 2019-11-25 RX ORDER — CEFAZOLIN SODIUM 1 G/3ML
INJECTION, POWDER, FOR SOLUTION INTRAMUSCULAR; INTRAVENOUS
Status: DISCONTINUED | OUTPATIENT
Start: 2019-11-25 | End: 2019-11-25

## 2019-11-25 RX ORDER — POLYETHYLENE GLYCOL 3350 17 G/17G
17 POWDER, FOR SOLUTION ORAL DAILY
Status: DISCONTINUED | OUTPATIENT
Start: 2019-11-25 | End: 2019-11-26 | Stop reason: HOSPADM

## 2019-11-25 RX ORDER — FLUTICASONE PROPIONATE 50 MCG
1 SPRAY, SUSPENSION (ML) NASAL 2 TIMES DAILY
Status: DISCONTINUED | OUTPATIENT
Start: 2019-11-25 | End: 2019-11-26 | Stop reason: HOSPADM

## 2019-11-25 RX ORDER — KETOROLAC TROMETHAMINE 30 MG/ML
15 INJECTION, SOLUTION INTRAMUSCULAR; INTRAVENOUS EVERY 8 HOURS
Status: DISCONTINUED | OUTPATIENT
Start: 2019-11-25 | End: 2019-11-26 | Stop reason: HOSPADM

## 2019-11-25 RX ORDER — ONDANSETRON 2 MG/ML
INJECTION INTRAMUSCULAR; INTRAVENOUS
Status: DISCONTINUED | OUTPATIENT
Start: 2019-11-25 | End: 2019-11-25

## 2019-11-25 RX ORDER — CEFAZOLIN SODIUM 1 G/3ML
2 INJECTION, POWDER, FOR SOLUTION INTRAMUSCULAR; INTRAVENOUS
Status: DISCONTINUED | OUTPATIENT
Start: 2019-11-25 | End: 2019-11-25

## 2019-11-25 RX ORDER — METHOCARBAMOL 500 MG/1
1000 TABLET, FILM COATED ORAL EVERY 6 HOURS PRN
Status: DISCONTINUED | OUTPATIENT
Start: 2019-11-25 | End: 2019-11-26 | Stop reason: HOSPADM

## 2019-11-25 RX ORDER — CELECOXIB 200 MG/1
400 CAPSULE ORAL ONCE
Status: COMPLETED | OUTPATIENT
Start: 2019-11-25 | End: 2019-11-25

## 2019-11-25 RX ORDER — HALOPERIDOL 5 MG/ML
0.5 INJECTION INTRAMUSCULAR EVERY 4 HOURS PRN
Status: DISCONTINUED | OUTPATIENT
Start: 2019-11-25 | End: 2019-11-25 | Stop reason: HOSPADM

## 2019-11-25 RX ORDER — ONDANSETRON 8 MG/1
8 TABLET, ORALLY DISINTEGRATING ORAL EVERY 6 HOURS PRN
Status: DISCONTINUED | OUTPATIENT
Start: 2019-11-25 | End: 2019-11-26 | Stop reason: HOSPADM

## 2019-11-25 RX ORDER — HYDROMORPHONE HYDROCHLORIDE 1 MG/ML
0.2 INJECTION, SOLUTION INTRAMUSCULAR; INTRAVENOUS; SUBCUTANEOUS EVERY 5 MIN PRN
Status: DISCONTINUED | OUTPATIENT
Start: 2019-11-25 | End: 2019-11-25 | Stop reason: HOSPADM

## 2019-11-25 RX ORDER — SCOLOPAMINE TRANSDERMAL SYSTEM 1 MG/1
1 PATCH, EXTENDED RELEASE TRANSDERMAL
Status: DISCONTINUED | OUTPATIENT
Start: 2019-11-25 | End: 2019-11-26 | Stop reason: HOSPADM

## 2019-11-25 RX ORDER — PROPOFOL 10 MG/ML
VIAL (ML) INTRAVENOUS
Status: DISCONTINUED | OUTPATIENT
Start: 2019-11-25 | End: 2019-11-25

## 2019-11-25 RX ADMIN — ROCURONIUM BROMIDE 50 MG: 10 INJECTION, SOLUTION INTRAVENOUS at 07:11

## 2019-11-25 RX ADMIN — PHENYLEPHRINE HYDROCHLORIDE 200 MCG: 10 INJECTION INTRAVENOUS at 07:11

## 2019-11-25 RX ADMIN — SODIUM CHLORIDE, SODIUM GLUCONATE, SODIUM ACETATE, POTASSIUM CHLORIDE, MAGNESIUM CHLORIDE, SODIUM PHOSPHATE, DIBASIC, AND POTASSIUM PHOSPHATE: .53; .5; .37; .037; .03; .012; .00082 INJECTION, SOLUTION INTRAVENOUS at 07:11

## 2019-11-25 RX ADMIN — EPHEDRINE SULFATE 10 MG: 50 INJECTION INTRAMUSCULAR; INTRAVENOUS; SUBCUTANEOUS at 10:11

## 2019-11-25 RX ADMIN — ROCURONIUM BROMIDE 10 MG: 10 INJECTION, SOLUTION INTRAVENOUS at 09:11

## 2019-11-25 RX ADMIN — PROMETHAZINE HYDROCHLORIDE 12.5 MG: 25 INJECTION INTRAMUSCULAR; INTRAVENOUS at 04:11

## 2019-11-25 RX ADMIN — SCOPALAMINE 1 PATCH: 1 PATCH, EXTENDED RELEASE TRANSDERMAL at 06:11

## 2019-11-25 RX ADMIN — SODIUM CHLORIDE: 0.9 INJECTION, SOLUTION INTRAVENOUS at 06:11

## 2019-11-25 RX ADMIN — SUGAMMADEX 143 MG: 100 INJECTION, SOLUTION INTRAVENOUS at 10:11

## 2019-11-25 RX ADMIN — ACETAMINOPHEN 1000 MG: 500 TABLET ORAL at 11:11

## 2019-11-25 RX ADMIN — PREGABALIN 150 MG: 150 CAPSULE ORAL at 06:11

## 2019-11-25 RX ADMIN — EPHEDRINE SULFATE 5 MG: 50 INJECTION INTRAMUSCULAR; INTRAVENOUS; SUBCUTANEOUS at 08:11

## 2019-11-25 RX ADMIN — KETAMINE HYDROCHLORIDE 10 MG: 100 INJECTION, SOLUTION, CONCENTRATE INTRAMUSCULAR; INTRAVENOUS at 09:11

## 2019-11-25 RX ADMIN — HEPARIN SODIUM 5000 UNITS: 5000 INJECTION, SOLUTION INTRAVENOUS; SUBCUTANEOUS at 06:11

## 2019-11-25 RX ADMIN — CEFAZOLIN 2 G: 330 INJECTION, POWDER, FOR SOLUTION INTRAMUSCULAR; INTRAVENOUS at 07:11

## 2019-11-25 RX ADMIN — FENTANYL CITRATE 100 MCG: 50 INJECTION, SOLUTION INTRAMUSCULAR; INTRAVENOUS at 07:11

## 2019-11-25 RX ADMIN — ROCURONIUM BROMIDE 20 MG: 10 INJECTION, SOLUTION INTRAVENOUS at 07:11

## 2019-11-25 RX ADMIN — DEXAMETHASONE SODIUM PHOSPHATE 8 MG: 4 INJECTION, SOLUTION INTRAMUSCULAR; INTRAVENOUS at 07:11

## 2019-11-25 RX ADMIN — ONDANSETRON 8 MG: 8 TABLET, ORALLY DISINTEGRATING ORAL at 02:11

## 2019-11-25 RX ADMIN — EPHEDRINE SULFATE 5 MG: 50 INJECTION INTRAMUSCULAR; INTRAVENOUS; SUBCUTANEOUS at 07:11

## 2019-11-25 RX ADMIN — PROPOFOL 100 MG: 10 INJECTION, EMULSION INTRAVENOUS at 07:11

## 2019-11-25 RX ADMIN — KETOROLAC TROMETHAMINE 15 MG: 30 INJECTION, SOLUTION INTRAMUSCULAR; INTRAVENOUS at 04:11

## 2019-11-25 RX ADMIN — ACETAMINOPHEN 1000 MG: 500 TABLET ORAL at 06:11

## 2019-11-25 RX ADMIN — PREGABALIN 150 MG: 50 CAPSULE ORAL at 10:11

## 2019-11-25 RX ADMIN — EPHEDRINE SULFATE 5 MG: 50 INJECTION INTRAMUSCULAR; INTRAVENOUS; SUBCUTANEOUS at 09:11

## 2019-11-25 RX ADMIN — SODIUM CHLORIDE: 0.9 INJECTION, SOLUTION INTRAVENOUS at 11:11

## 2019-11-25 RX ADMIN — METHOCARBAMOL TABLETS 1000 MG: 500 TABLET, COATED ORAL at 11:11

## 2019-11-25 RX ADMIN — KETAMINE HYDROCHLORIDE 10 MG: 100 INJECTION, SOLUTION, CONCENTRATE INTRAMUSCULAR; INTRAVENOUS at 08:11

## 2019-11-25 RX ADMIN — MIDAZOLAM HYDROCHLORIDE 2 MG: 1 INJECTION, SOLUTION INTRAMUSCULAR; INTRAVENOUS at 06:11

## 2019-11-25 RX ADMIN — PROPOFOL 50 MG: 10 INJECTION, EMULSION INTRAVENOUS at 07:11

## 2019-11-25 RX ADMIN — PANTOPRAZOLE SODIUM 40 MG: 40 TABLET, DELAYED RELEASE ORAL at 06:11

## 2019-11-25 RX ADMIN — SODIUM CHLORIDE, SODIUM GLUCONATE, SODIUM ACETATE, POTASSIUM CHLORIDE, MAGNESIUM CHLORIDE, SODIUM PHOSPHATE, DIBASIC, AND POTASSIUM PHOSPHATE: .53; .5; .37; .037; .03; .012; .00082 INJECTION, SOLUTION INTRAVENOUS at 06:11

## 2019-11-25 RX ADMIN — ONDANSETRON 4 MG: 2 INJECTION INTRAMUSCULAR; INTRAVENOUS at 09:11

## 2019-11-25 RX ADMIN — BUPIVACAINE HYDROCHLORIDE 30 ML: 7.5 INJECTION, SOLUTION EPIDURAL; RETROBULBAR at 07:11

## 2019-11-25 RX ADMIN — CELECOXIB 400 MG: 200 CAPSULE ORAL at 06:11

## 2019-11-25 RX ADMIN — KETOROLAC TROMETHAMINE 15 MG: 30 INJECTION, SOLUTION INTRAMUSCULAR; INTRAVENOUS at 09:11

## 2019-11-25 RX ADMIN — ROCURONIUM BROMIDE 20 MG: 10 INJECTION, SOLUTION INTRAVENOUS at 08:11

## 2019-11-25 RX ADMIN — KETAMINE HYDROCHLORIDE 30 MG: 100 INJECTION, SOLUTION, CONCENTRATE INTRAMUSCULAR; INTRAVENOUS at 07:11

## 2019-11-25 RX ADMIN — LIDOCAINE HYDROCHLORIDE 100 MG: 20 INJECTION, SOLUTION INTRAVENOUS at 07:11

## 2019-11-25 RX ADMIN — PHENYLEPHRINE HYDROCHLORIDE 100 MCG: 10 INJECTION INTRAVENOUS at 07:11

## 2019-11-25 NOTE — TRANSFER OF CARE
Anesthesia Transfer of Care Note    Patient: Nilton Chin    Procedure(s) Performed: Procedure(s) (LRB):  XI ROBOTIC PROSTATECTOMY (N/A)    Patient location: PACU    Anesthesia Type: general    Transport from OR: Transported from OR on 2-3 L/min O2 by NC with adequate spontaneous ventilation    Post pain: adequate analgesia    Post assessment: no apparent anesthetic complications    Post vital signs: stable    Level of consciousness: awake    Nausea/Vomiting: no nausea/vomiting    Complications: none    Transfer of care protocol was followed      Last vitals:   Visit Vitals  BP (!) 108/55 (BP Location: Right arm, Patient Position: Lying)   Pulse 100   Temp 36.8 °C (98.2 °F) (Oral)   Resp 18   Wt 71.7 kg (158 lb)   SpO2 100%   BMI 24.75 kg/m²

## 2019-11-25 NOTE — NURSING
Pt admitted to the floor. Oriented to room and environment. VSS. Resting comfortably in bed. Bed locked and in lowest position, call light within reach. Will continue to monitor.

## 2019-11-25 NOTE — ANESTHESIA PROCEDURE NOTES
Erector Spinae Plane Single Injection Block    Patient location during procedure: pre-op   Block not for primary anesthetic.  Reason for block: at surgeon's request and post-op pain management   Post-op Pain Location: bilateral chest wall pain  Start time: 11/25/2019 6:38 AM  Timeout: 11/25/2019 6:35 AM   End time: 11/25/2019 6:45 AM    Staffing  Authorizing Provider: Blas Cervantes MD  Performing Provider: Umu Rdz MD    Preanesthetic Checklist  Completed: patient identified, site marked, surgical consent, pre-op evaluation, timeout performed, IV checked, risks and benefits discussed and monitors and equipment checked  Peripheral Block  Patient position: sitting  Prep: ChloraPrep  Patient monitoring: heart rate, cardiac monitor, continuous pulse ox, continuous capnometry and frequent blood pressure checks  Block type: erector spinae plane (Erector Spinae Plane Block)  Laterality: bilateral  Injection technique: single shot  Location: T8-9  Needle  Needle type: Tuohy   Needle gauge: 17 G  Needle length: 3.5 in  Needle localization: anatomical landmarks and ultrasound guidance   -ultrasound image captured on disc.  Assessment  Injection assessment: negative aspiration, negative parasthesia and local visualized surrounding nerve  Paresthesia pain: none  Heart rate change: no  Slow fractionated injection: yes  Additional Notes  Patient tolerated well.  [facility]    30cc 0.375 bupi injected bilaterally

## 2019-11-25 NOTE — NURSING TRANSFER
Nursing Transfer Note      11/25/2019     Transfer To: 501    Transfer via bed    Transfer with n/a    Transported by PCT x 2     Medicines sent: flonase     Chart send with patient: Yes    Notified: spouse via text messaging system    Patient reassessed at: 11/25/2019  1143

## 2019-11-25 NOTE — ANESTHESIA POSTPROCEDURE EVALUATION
Anesthesia Post Evaluation    Patient: Nilton Chin    Procedure(s) Performed: Procedure(s) (LRB):  XI ROBOTIC PROSTATECTOMY (N/A)    Final Anesthesia Type: general    Patient location during evaluation: PACU  Patient participation: Yes- Able to Participate  Level of consciousness: awake and alert and oriented  Post-procedure vital signs: reviewed and stable  Pain management: adequate  Airway patency: patent    PONV status at discharge: No PONV  Anesthetic complications: no      Cardiovascular status: blood pressure returned to baseline, hemodynamically stable and stable  Respiratory status: unassisted, room air and spontaneous ventilation  Hydration status: euvolemic  Follow-up not needed.          Vitals Value Taken Time   /67 11/25/2019 11:46 AM   Temp 36.1 °C (97 °F) 11/25/2019 10:49 AM   Pulse 81 11/25/2019 11:50 AM   Resp 20 11/25/2019 11:50 AM   SpO2 94 % 11/25/2019 11:50 AM   Vitals shown include unvalidated device data.      No case tracking events are documented in the log.      Pain/Paulina Score: Pain Rating Prior to Med Admin: 0 (11/25/2019 11:33 AM)  Pain Rating Post Med Admin: 0 (11/25/2019  6:50 AM)  Paulina Score: 8 (11/25/2019 11:25 AM)

## 2019-11-26 VITALS
DIASTOLIC BLOOD PRESSURE: 70 MMHG | OXYGEN SATURATION: 100 % | HEART RATE: 76 BPM | WEIGHT: 158.75 LBS | SYSTOLIC BLOOD PRESSURE: 109 MMHG | RESPIRATION RATE: 16 BRPM | BODY MASS INDEX: 24.86 KG/M2 | TEMPERATURE: 99 F

## 2019-11-26 PROCEDURE — 25000003 PHARM REV CODE 250: Performed by: STUDENT IN AN ORGANIZED HEALTH CARE EDUCATION/TRAINING PROGRAM

## 2019-11-26 PROCEDURE — 63600175 PHARM REV CODE 636 W HCPCS: Performed by: STUDENT IN AN ORGANIZED HEALTH CARE EDUCATION/TRAINING PROGRAM

## 2019-11-26 RX ORDER — METHOCARBAMOL 500 MG/1
1000 TABLET, FILM COATED ORAL EVERY 6 HOURS PRN
Qty: 30 TABLET | Refills: 0 | Status: SHIPPED | OUTPATIENT
Start: 2019-11-26 | End: 2019-12-03

## 2019-11-26 RX ORDER — OXYCODONE HYDROCHLORIDE 5 MG/1
5 TABLET ORAL EVERY 4 HOURS PRN
Qty: 10 TABLET | Refills: 0 | Status: SHIPPED | OUTPATIENT
Start: 2019-11-26 | End: 2019-12-03

## 2019-11-26 RX ORDER — PROMETHAZINE HYDROCHLORIDE 12.5 MG/1
12.5 TABLET ORAL EVERY 6 HOURS PRN
Qty: 30 TABLET | Refills: 0 | Status: SHIPPED | OUTPATIENT
Start: 2019-11-26 | End: 2019-12-03

## 2019-11-26 RX ORDER — ACETAMINOPHEN 500 MG
1000 TABLET ORAL EVERY 6 HOURS
Qty: 30 TABLET | Refills: 0 | Status: SHIPPED | OUTPATIENT
Start: 2019-11-26 | End: 2020-01-16

## 2019-11-26 RX ORDER — POLYETHYLENE GLYCOL 3350 17 G/17G
17 POWDER, FOR SOLUTION ORAL DAILY
Qty: 10 PACKET | Refills: 0 | Status: SHIPPED | OUTPATIENT
Start: 2019-11-26 | End: 2019-12-03 | Stop reason: ALTCHOICE

## 2019-11-26 RX ORDER — NITROFURANTOIN MACROCRYSTALS 50 MG/1
50 CAPSULE ORAL EVERY 6 HOURS
Qty: 28 CAPSULE | Refills: 0 | Status: SHIPPED | OUTPATIENT
Start: 2019-11-26 | End: 2019-12-03 | Stop reason: ALTCHOICE

## 2019-11-26 RX ADMIN — KETOROLAC TROMETHAMINE 15 MG: 30 INJECTION, SOLUTION INTRAMUSCULAR; INTRAVENOUS at 06:11

## 2019-11-26 RX ADMIN — POLYETHYLENE GLYCOL 3350 17 G: 17 POWDER, FOR SOLUTION ORAL at 09:11

## 2019-11-26 RX ADMIN — PANTOPRAZOLE SODIUM 40 MG: 40 TABLET, DELAYED RELEASE ORAL at 09:11

## 2019-11-26 RX ADMIN — LOSARTAN POTASSIUM AND HYDROCHLOROTHIAZIDE 1 TABLET: 50; 12.5 TABLET, FILM COATED ORAL at 09:11

## 2019-11-26 RX ADMIN — ACETAMINOPHEN 1000 MG: 500 TABLET ORAL at 06:11

## 2019-11-26 NOTE — PLAN OF CARE
Pt resting in bed comfortably. PIV line intact and infusing, free of infection and irritation. Fall precautions maintained, no falls noted. Call light within reach, bed locked and in lowest position. Non-skid socks on while out of bed. Patient instructed to call for assistance. Skin integrity maintained as patient is independent with frequent repositioning. Nichols catheter in place draining to  bag. C/o nausea, managed with PRN meds, no other complaints or concerns. Progressing towards goals. Will continue to monitor and follow plan of care.

## 2019-11-26 NOTE — DISCHARGE SUMMARY
[facility]  Urology  Discharge Summary      Patient Name: Nilton Chin  MRN: [id number]  Admission Date: 11/25/2019  Hospital Length of Stay: 1 days  Discharge Date and Time:  11/26/2019 6:47 AM  Attending Physician: [place] Lawrence MD   Discharging Provider: Arnulfo Johnson MD  Primary Care Physician: Arnulfo Hammonds DO    HPI:    Nilton Chin is a 59 y.o. male with [id number] [place] 4+4=8 prostate adenocarcinoma.       The patient initially presented with elevated PSA.  PSA: 4.4  TRUS biopsy on 10/4/19 revealed the following:                                         LEFT                           RIGHT  Base                            Benign                         3+4=7, 1/1, 30%  Mid                              3+3=6, 2/2, 50%         4+4=8, 2/2, 60%  [place]                           3+4=7, 2/2, 25%         3+4=7, 2/2, 60%     Percentage of total cores positive: 9/12 = 75%  Volume: 19.9 g  ADELE 5, intermediate risk  NCCN risk: High risk     MRI (11/12/19): PIRADS 5 lesion at PZ. No EPE. No NVB or SV invasion.     Voiding complaints: present  ED: absent  Incontinence: absent     No prior abdominal surgeries.     History of HTN. No cardiac or pulmonary disorder. No angina on exertion.    Procedure(s) (LRB):  XI ROBOTIC PROSTATECTOMY (N/A)     Indwelling Lines/Drains at time of discharge:   Lines/Drains/Airways     Drain                 Urethral Catheter 11/25/19 0735 Straight-tip;Non-latex 18 Fr. less than 1 day                Hospital Course (synopsis of major diagnoses, care, treatment, and services provided during the course of the hospital stay): On 11/25 the patient underwent the above procedure. He tolerated it well and was kept overnight for monitoring. On POD1 he was ambulating, tolerating diet, with pain controlled. Incisions were clean, dry, and intact, abdomen benign, and Nichols draining light pink urine. He was discharged on POD1. He will follow up on 12/3 with an STACI for  voiding trial.    Consults:     Significant Diagnostic Studies:     Pending Diagnostic Studies:     Procedure Component Value Units Date/Time    Specimen to Pathology, Surgery Urology [[id number]] Collected:  11/25/19 1008    Order Status:  Sent Lab Status:  In process Updated:  11/25/19 0788          Final Active Diagnoses:    Diagnosis Date Noted POA    PRINCIPAL PROBLEM:  Prostate cancer [C61] 10/17/2019 Yes      Problems Resolved During this Admission:       Discharged Condition: good    Disposition: Home or Self Care    Follow Up:  Follow-up Information     Conchita Mejia NP On 12/3/2019.    Specialties:  Family Medicine, Urology  Contact information:  [address]  [facility] [address]  [phone]                 Patient Instructions:      Diet general     Call MD for:  extreme fatigue     Call MD for:  persistent dizziness or light-headedness     Call MD for:  hives     Call MD for:  redness, tenderness, or signs of infection (pain, swelling, redness, odor or green/yellow discharge around incision site)     Call MD for:  difficulty breathing, headache or visual disturbances     Call MD for:  severe uncontrolled pain     Call MD for:  persistent nausea and vomiting     Call MD for:  temperature >100.4     Medications:  Reconciled Home Medications:      Medication List      START taking these medications    acetaminophen 500 MG tablet  Commonly known as:  TYLENOL  Take 2 tablets (1,000 mg total) by mouth every 6 (six) hours.     methocarbamol 500 MG Tab  Commonly known as:  ROBAXIN  Take 2 tablets (1,000 mg total) by mouth every 6 (six) hours as needed (incisional pain).     nitrofurantoin 50 MG capsule  Commonly known as:  MACRODANTIN  Take 1 capsule (50 mg total) by mouth every 6 (six) hours. for 7 days     oxyCODONE 5 MG immediate release tablet  Commonly known as:  ROXICODONE  Take 1 tablet (5 mg total) by mouth every 4 (four) hours as needed.     polyethylene glycol 17 gram Pwpk  Commonly known  as:  GLYCOLAX  Take 17 g by mouth once daily.     promethazine 12.5 MG Tab  Commonly known as:  PHENERGAN  Take 1 tablet (12.5 mg total) by mouth every 6 (six) hours as needed (nausea).        CONTINUE taking these medications    fluticasone propionate 50 mcg/actuation nasal spray  Commonly known as:  FLONASE  1 spray (50 mcg total) by Each Nostril route 2 (two) times daily.     gentamicin 40 mg/mL injection  Commonly known as:  GARAMYCIN  Inject into the muscle.     hyoscyamine 0.125 mg Tab  Commonly known as:  ANASPAZ,LEVSIN  Take 125 mcg by mouth every 4 (four) hours as needed.     ibuprofen 200 MG tablet  Commonly known as:  ADVIL,MOTRIN  Take 200 mg by mouth every 6 (six) hours as needed for Pain.     losartan-hydrochlorothiazide 50-12.5 mg 50-12.5 mg per tablet  Commonly known as:  HYZAAR  TAKE 1 TABLET BY MOUTH EVERY DAY     promethazine-dextromethorphan 6.25-15 mg/5 mL Syrp  Commonly known as:  PROMETHAZINE-DM  Take 5 mLs by mouth every 4 to 6 hours as needed. 5 mL every 4 to 6 hours; maximum: 30 mL in 24 hours     Ricola 1.1 mg Lozg  Generic drug:  menthol  by Mucous Membrane route.     tamsulosin 0.4 mg Cap  Commonly known as:  FLOMAX  TAKE 1 CAPSULE BY MOUTH EVERY DAY            Time spent on the discharge of patient: 20 minutes    Arnulfo Johnson MD  Urology  [facility]

## 2019-11-26 NOTE — ADDENDUM NOTE
Addendum  created 11/26/19 0624 by Blas Cervantes MD    Attestation recorded in Intraprocedure, Intraprocedure Attestations filed

## 2019-11-26 NOTE — DISCHARGE INSTRUCTIONS
What to expect with your Robotic Assisted Laparoscopic Prostatectomy.  [facility]  Updated 06/10/2011   Surgery  o Your surgery will last between 1.5 - 3 hours.   After surgery  o You may or may not have a drain that is shaped like a grenade and put to suction  - This drain usually comes out on Post Op Day (POD) 1. It will remain if the output is high and the nurses will teach you how to record the output and you will come back a few days after you leave to have the drain removed  o You will have a catheter after your surgery.  - This catheter protects your tissues to allow for healing between the bladder neck and the urethra now that the prostate has been removed.  - NO ONE IS TO REMOVE THIS CATHETER OR CHANGE THE CATHETER OTHER THAN SOMEONE FROM [facility].  - If you have to go to the ER because your catheter is not draining, come to the [facility] if possible and they will call us if they are not able to irrigate your catheter.  - If you live out of town and have to go to a local ER, then DO NOT let that doctor remove your catheter. If they are unable to irrigate the catheter or are having troubles with it, have them page the [facility] resident on call immediately at [phone] to help answer any questions.  - The catheter should come out in 7-10 days.   - You will have a midline incision after surgery where the prostate was removed. This will be sewn with absorbable suture so you do not need to worry about having the sutures removed.  - You will have smaller incisions where the instruments were inserted that are also sewn closed with absorbable sutures.  o The night of surgery we expect and hope that you will:  - Walk - walking helps get the bowels moving. Also after your surgery, you are at a risk for a deep venous thrombosis (which is a clot in the legs that can form by remaining inactive or still for extended periods of time) and this can travel to your lungs and make you  feel short of breath. This is a very serious condition. Walking helps prevent a DVT from occurring.  - Eat - you do not have to eat a whole meal, but we want to make sure you can tolerate liquid and/or solid food without nausea and vomiting  - Use your incentive spirometer - this is the breathing apparatus that helps you expand your lungs. If and when you have pain you will not want to take deep breaths. But if you dont take deep breaths, you are at risk for pneumonia. The incentive spirometer will help prevent this from occurring by expanding your lungs.  o Symptoms you may experience immediately post-op:  - Bloating and/or shoulder pain - when we do this operation, we fill up your abdominal cavity with gas to better help us visualize the organs and allow our instruments to fit. After the surgery, not all the air can be removed and your body will eventually absorb this small amount of air. However this can make you feel bloated. In addition, when you sit up, the air can sit right under a muscle (the diaphragm) which has connecting nerves to the shoulders, which could explain why you have shoulder pain.  - Do not expect necessarily to have gas or to have a bowel movement - this goes along with the bloating, you may feel like you want to pass gas or have a bowel movement but you cant. This is normal and you will feel like this for a couple days. There are no pills to help with this. Small walks throughout the day should help with this.  - Pain - your pain should be able to be controlled with medicines by mouth that we prescribe. It is important for you to tell us if you are on any pain medications at home before the surgery as you may need stronger pain meds while in the hospital.  - Bladder spasms - this feels like you have to urinate but cant. Sometimes it can be due to clots clogging up your catheter. The nurse will irrigate the catheter, if there are no clots we can prescribe you an anti-spasmodic. We can also  send you home with a prescription for one.   If you go home on anti-spasmodics, do not take one the morning of your appointment to have your catheter removed or you may not be able to void.   You can go home when:  o Pain is controlled with medicines by mouth  o You are able to walk without difficulty or pain  o You are tolerating a regulating diet  o Your catheter is draining freely   When you go home:  o Catheter care  - Blood in your urine (hematuria) is normal. However if you are having clots or your catheter stops draining and you are experiencing abdominal pain, go to the ER.  - If the tip of your penis hurts with the catheter in place, you can put some Vaseline at the end of the catheter to help lubricate the catheter.  o Activity  - Continue to walk - small walks throughout the day are better than one long walk.   - Do not lift anything greater than 8 pounds for 6 weeks - we want your abdominal wall muscles to heal.  o Bowel Movements - Do not strain to have a bowel movement - the pain medicines will make you constipated. That is why we also ask you to take colace 2-3 x per day to help keep your bowels regular. If you are still having trouble, then you can also add Miralax once a day. Do not take any stool softeners if you are having diarrhea.  o Drain - If you have a drain (not your catheter, but a separate drain) then record the output and bring it with you to your next appointment  o Smoking - If you smoke, we encourage you to STOP. Smoking interferes with the healing process and will prolong your healing with continued smoking.  o Driving - Do not drive while you are on pain meds or with your catheter in place.  o Bathing - If you do not have a drain, you can shower 48 hours after your surgery. If you do have a drain, sponge bathe only until the drain is out.  o Dressing - you can remove the dressings if there is no drainage or change them as needed if there is. The little sterile band-aid strips will  fall off on their own in 10-14 days. If they have not fallen off then you can remove them yourself after 14 days.  o Kegels - do not start your Kegels until after your catheter is out.  o Restarting medicines -especially blood thinners (Coumadin, ASA,Plavix), Fish Oil. Discuss this with your physician prior to discharge   When to return to the ER  o Fever - If you have a fever >101.5, this could be due to a number of reasons such as infection of the urine or incision. If your catheter has been removed, you could possibly have a leak. It would be best to come to the ER so they can better evaluate you.  o Severe pain - pain is expected, but severe or new onset of pain is not normal.   o Inability to tolerate food or liquid with nausea and vomiting - it would be best to go to the ER for them to better evaluate you.      Consents to be obtained - Surgical and Blood.  Before you sign the consent, understand the following risks:  o Surgery consent  - Bleeding - the prostate and its surrounding vessels are very vascular and bleeding can occur requiring a blood transfusion.  - Erectile dysfunction (impotence) - you will not have erections after surgery immediately post-op.  Your potency depends on your age and if you were potent beforehand. Nerve-sparing also helps. However, it does not guarantee erectile function. There are options to discuss after surgery if you still remain impotent.  - Incontinence -95% of people regain their continence by one year. How fast you become continent again cannot be determined beforehand. Kegels, however, do help and you should start your kegels after your catheter is out.   - Blockage of ureters (the drainage tube between the kidney and the bladder)- if this occurrs, you will need another procedure to unblock the ureters.  - Stricture of the urethra or Bladder Neck Contracture - any time the urethra is instrumented, there is a risk of having strictures and this would require another  procedure to dilate the stricture. Bladder neck contracture can occur after any type of anastomotic procedure. You would present with difficulty voiding or emptying your bladder.  - Damage to rectal wall, bowel, liver, spleen or any of the surrounding organs   - Infection of urine or incision requiring further treatment  - Pulmonary Emboli (blood clots) - this is why we ask you to walk around after surgery and the night of surgery to help prevent clots  - Need for conversion to open procedure   - Air embolus - to introduce air into the abdomen a needle is inserted into the abdomen, if this enters a vein or artery it can cause an embolus that travels to the heart and cause death.  - Anastomotic leak - there is always a chance that the tissues have not healed when the catheter is removed and you may possibly have a leak. You may possibly present or complain of abdominal pain, weakness or fever. If you have these symptoms return to the ER. If you have a leak you will need a catheter to be replaced, likely under direct vision with a camera.   - Small bowel obstruction (SBO) - after surgery, people can have adhesions that form from inflammation caused by surgery and this can cause a SBO, which would present with nausea and vomiting and unable to pass stool.  - Ileus - your bowels can be in shock after surgery and you will present with nausea and vomiting. Sometimes this requires a tube that is placed into your stomach until your bowels start working again, this is temporary.  - Acute or chronic pain  - Re-insertion of catheter if there is a urinary leak  - Scars  - Death, paralysis from the neck or waist down, loss of limb  o Blood consents  - Risk of HIV and Hepatitis or any other blood borne disease  - Risk of allergic reaction to blood treated symptomatically  -

## 2019-11-26 NOTE — NURSING
Patient received discharge instructions. Verbalized understanding of all instructions. Educated on use of leg bag and care of johnson catheter. Pt returned demonstration. Prescriptions sent to patients personal pharmacy. PIVs removed, no redness/swelling, catheter tips intact. Awaiting wheelchair for discharge transport.

## 2019-12-03 ENCOUNTER — OFFICE VISIT (OUTPATIENT)
Dept: UROLOGY | Facility: CLINIC | Age: 59
End: 2019-12-03
Payer: COMMERCIAL

## 2019-12-03 VITALS
SYSTOLIC BLOOD PRESSURE: 139 MMHG | WEIGHT: 154.31 LBS | BODY MASS INDEX: 24.22 KG/M2 | HEIGHT: 67 IN | HEART RATE: 88 BPM | DIASTOLIC BLOOD PRESSURE: 88 MMHG

## 2019-12-03 DIAGNOSIS — N52.31 ERECTILE DYSFUNCTION FOLLOWING RADICAL PROSTATECTOMY: ICD-10-CM

## 2019-12-03 DIAGNOSIS — C61 PROSTATE CANCER: Primary | ICD-10-CM

## 2019-12-03 DIAGNOSIS — Z98.890 POST-OPERATIVE STATE: ICD-10-CM

## 2019-12-03 DIAGNOSIS — Z90.79 HISTORY OF ROBOT-ASSISTED LAPAROSCOPIC RADICAL PROSTATECTOMY: ICD-10-CM

## 2019-12-03 DIAGNOSIS — Z46.6 ENCOUNTER FOR FOLEY CATHETER REMOVAL: ICD-10-CM

## 2019-12-03 PROCEDURE — 99999 PR PBB SHADOW E&M-EST. PATIENT-LVL IV: CPT | Mod: PBBFAC,,, | Performed by: NURSE PRACTITIONER

## 2019-12-03 PROCEDURE — 99024 PR POST-OP FOLLOW-UP VISIT: ICD-10-PCS | Mod: S$GLB,,, | Performed by: NURSE PRACTITIONER

## 2019-12-03 PROCEDURE — 99999 PR PBB SHADOW E&M-EST. PATIENT-LVL IV: ICD-10-PCS | Mod: PBBFAC,,, | Performed by: NURSE PRACTITIONER

## 2019-12-03 PROCEDURE — 99024 POSTOP FOLLOW-UP VISIT: CPT | Mod: S$GLB,,, | Performed by: NURSE PRACTITIONER

## 2019-12-03 NOTE — PATIENT INSTRUCTIONS
Kegel Exercises     Kegel exercises dont require special clothing or equipment. Theyre easy to learn and simple to do. And if you do them right, no one can tell youre doing them, so they can be done almost anywhere. Your doctor, nurse, or physical therapist can answer any questions you have and help you get started.     A Weak Pelvic Floor   The pelvic floor muscles may weaken due to aging, pregnancy and vaginal childbirth, injury, surgery, chronic cough, or lack of exercise. If the pelvic floor is weak, your bladder and other pelvic organs may sag out of place. The urethra may also open too easily and allow urine to leak out. Kegel exercises can help you strengthen your pelvic floor muscles so they can better support the pelvic organs and control urine flow.     How Kegel Exercises Are Done   Try each of the Kegel exercises described below. When youre doing them, try not to move your leg, buttock, or stomach muscles.   While youre urinating, try to stop the flow of urine. Start and stop it as often as you can.   Contract as if you were stopping your urine stream, but do it when youre not urinating.   Tighten your rectum as if trying not to pass gas. Contract your anus, but dont move your buttocks.    Helpful Hints   Do your Kegels as often as you can. The more you do them, the faster youll feel the results.   Pick an activity you do often as a reminder. For instance, do your Kegels every time you sit down.   Tighten your pelvic floor before you sneeze, get up from a chair, cough, laugh, or lift. This protects your pelvic floor from injury and can help prevent urine leakage.      Try to hold each Kegel for a slow count to five. You probably wont be able to hold them for that long at first, but keep practicing. It will get easier as your pelvic floor gets stronger. Eventually, special weights that you place in your vagina may be recommended to help make your Kegels even more effective.    © 1738-6957 [employer]  [employer], [address] [place] [address], [place] [address]. All rights reserved. This information is not intended as a substitute for professional medical care. Always follow your healthcare professional's instructions.

## 2019-12-03 NOTE — PROGRESS NOTES
Subjective:       Patient ID: Nilton Chin is a 59 y.o. male.    Chief Complaint: Post-op Evaluation    Nilton Chin is a 59 y.o. Male with prostate cancer; s/p RALP with Dr. Lawrence  Final Pathology still processing    TRUS biopsy on 10/4/19 revealed the following:                                         LEFT                           RIGHT  Base                            Benign                         3+4=7, 1/1, 30%  Mid                              3+3=6, 2/2, 50%         4+4=8, 2/2, 60%  [place]                           3+4=7, 2/2, 25%         3+4=7, 2/2, 60%     Percentage of total cores positive: 9/12 = 75%  Volume: 19.9 g  ADELE 5, intermediate risk  NCCN risk: High risk     MRI (11/12/19): PIRADS 5 lesion at PZ. No EPE. No NVB or SV invasion.     Voiding complaints: present  ED: absent  Incontinence: absent      Today he is here for johnson removal.  He voices no complaints.  Not taking pain meds.  Good urine flow.  No issues with constipation.          Past Medical History:  No date: Allergy  No date: BPH (benign prostatic hypertrophy)  No date: History of prostatitis  No date: Hypertension    Past Surgical History:  No date: PROSTATE BIOPSY  11/25/2019: ROBOT-ASSISTED LAPAROSCOPIC PROSTATECTOMY USING DA EMELY   XI; N/A      Comment:  Procedure: XI ROBOTIC PROSTATECTOMY;  Surgeon: Jose Carlos Lawrence MD;  Location: [facility] OR [address] [place] [address];  Service:                Urology;  Laterality: N/A;  3hrs gen with regional     Review of patient's family history indicates:  Problem: Cataracts      Relation: Mother          Age of Onset: (Not Specified)  Problem: Hypertension      Relation: Mother          Age of Onset: (Not Specified)  Problem: Cataracts      Relation: Father          Age of Onset: (Not Specified)  Problem: Glaucoma      Relation: Father          Age of Onset: (Not Specified)  Problem: Hypertension      Relation: Father          Age of Onset: (Not Specified)  Problem: Blindness      Relation:  Other          Age of Onset: (Not Specified)  Problem: Cancer      Relation: Paternal Grandfather          Age of Onset: (Not Specified)          Comment: lung/prostate  Problem: Diabetes      Relation: Paternal Grandfather          Age of Onset: (Not Specified)  Problem: Diabetes      Relation: Maternal Grandmother          Age of Onset: (Not Specified)  Problem: Heart disease      Relation: Maternal Grandmother          Age of Onset: (Not Specified)  Problem: Amblyopia      Relation: Neg Hx          Age of Onset: (Not Specified)  Problem: Strabismus      Relation: Neg Hx          Age of Onset: (Not Specified)  Problem: Retinal detachment      Relation: Neg Hx          Age of Onset: (Not Specified)  Problem: Macular degeneration      Relation: Neg Hx          Age of Onset: (Not Specified)  Problem: Stroke      Relation: Neg Hx          Age of Onset: (Not Specified)  Problem: Melanoma      Relation: Neg Hx          Age of Onset: (Not Specified)      Social History    Socioeconomic History      Marital status:       Spouse name: Not on file      Number of children: 3      Years of education: Not on file      Highest education level: Not on file    Occupational History      Occupation:     Social Needs      Financial resource strain: Not on file      Food insecurity:        Worry: Not on file        Inability: Not on file      Transportation needs:        Medical: Not on file        Non-medical: Not on file    Tobacco Use      Smoking status: Former Smoker        Years: 35.00        Types: Cigarettes        Start date: 6/24/2013      Smokeless tobacco: Never Used      Tobacco comment: 6 cig a day    Substance and Sexual Activity      Alcohol use: Yes        Comment: socially/ not weekly      Drug use: No      Sexual activity: Yes        Partners: Female    Lifestyle      Physical activity:        Days per week: Not on file        Minutes per session: Not on file      Stress: Not on file     Relationships      Social connections:        Talks on phone: Not on file        Gets together: Not on file        Attends Sikh service: Not on file        Active member of club or organization: Not on file        Attends meetings of clubs or organizations: Not on file        Relationship status: Not on file    Other Topics      Concerns:        Not on file    Social History Narrative      Not on file      Allergies:  Codeine and Vicodin (hydrocodone-acetaminophen)    Medications:  Current Outpatient Medications:   polyethylene glycol (GLYCOLAX) 17 gram PwPk, Take 17 g by mouth once daily., Disp: 10 packet, Rfl: 0  promethazine-dextromethorphan (PROMETHAZINE-DM) 6.25-15 mg/5 mL Syrp, Take 5 mLs by mouth every 4 to 6 hours as needed. 5 mL every 4 to 6 hours; maximum: 30 mL in 24 hours, Disp: 118 mL, Rfl: 0  tamsulosin (FLOMAX) 0.4 mg Cap, TAKE 1 CAPSULE BY MOUTH EVERY DAY, Disp: 90 capsule, Rfl: 3  acetaminophen (TYLENOL) 500 MG tablet, Take 2 tablets (1,000 mg total) by mouth every 6 (six) hours., Disp: 30 tablet, Rfl: 0  fluticasone propionate (FLONASE) 50 mcg/actuation nasal spray, 1 spray (50 mcg total) by Each Nostril route 2 (two) times daily., Disp: 1 Bottle, Rfl: 0  gentamicin (GARAMYCIN) 40 mg/mL injection, Inject into the muscle., Disp: , Rfl:   hyoscyamine (ANASPAZ,LEVSIN) 0.125 mg Tab, Take 125 mcg by mouth every 4 (four) hours as needed., Disp: , Rfl:   ibuprofen (ADVIL,MOTRIN) 200 MG tablet, Take 200 mg by mouth every 6 (six) hours as needed for Pain., Disp: , Rfl:   losartan-hydrochlorothiazide 50-12.5 mg (HYZAAR) 50-12.5 mg per tablet, TAKE 1 TABLET BY MOUTH EVERY DAY, Disp: 90 tablet, Rfl: 3  menthol (RICOLA) 1.1 mg Lozg, by Mucous Membrane route., Disp: , Rfl:   methocarbamol (ROBAXIN) 500 MG Tab, Take 2 tablets (1,000 mg total) by mouth every 6 (six) hours as needed (incisional pain)., Disp: 30 tablet, Rfl: 0  nitrofurantoin (MACRODANTIN) 50 MG capsule, Take 1 capsule (50 mg total)  by mouth every 6 (six) hours. for 7 days, Disp: 28 capsule, Rfl: 0  oxyCODONE (ROXICODONE) 5 MG immediate release tablet, Take 1 tablet (5 mg total) by mouth every 4 (four) hours as needed., Disp: 10 tablet, Rfl: 0  promethazine (PHENERGAN) 12.5 MG Tab, Take 1 tablet (12.5 mg total) by mouth every 6 (six) hours as needed (nausea). (Patient not taking: Reported on 12/3/2019), Disp: 30 tablet, Rfl: 0      Review of Systems   Constitutional: Negative for chills and fever.   Respiratory: Negative.  Negative for shortness of breath.    Cardiovascular: Negative.  Negative for chest pain and leg swelling.   Gastrointestinal: Negative.  Negative for constipation, diarrhea, nausea and vomiting.   Genitourinary: Negative for penile pain, penile swelling, scrotal swelling and testicular pain.        Nichols draining well         Objective:      Physical Exam   Nursing note and vitals reviewed.  Constitutional: He is oriented to person, place, and time. Vital signs are normal. He appears well-developed and well-nourished. He is active and cooperative.  Non-toxic appearance. He does not have a sickly appearance.   HENT:   Head: Normocephalic and atraumatic.   Right Ear: External ear normal.   Left Ear: External ear normal.   Nose: Nose normal.   Mouth/Throat: Mucous membranes are normal.   Eyes: Conjunctivae and lids are normal. No scleral icterus.   Neck: Trachea normal, normal range of motion and full passive range of motion without pain. Neck supple. No JVD present. No tracheal deviation present.   Cardiovascular: Normal rate, S1 normal and S2 normal.    Pulmonary/Chest: Effort normal. No respiratory distress. He exhibits no tenderness.   Abdominal: Soft. Normal appearance. There is no hepatosplenomegaly. There is no tenderness. There is no CVA tenderness.   Genitourinary: Testes normal and penis normal.       Musculoskeletal: Normal range of motion.   Neurological: He is alert and oriented to person, place, and time. He has  normal strength.   Skin: Skin is warm, dry and intact.     Psychiatric: He has a normal mood and affect. His behavior is normal. Judgment and thought content normal.       Assessment:       1. Prostate cancer    2. History of robot-assisted laparoscopic radical prostatectomy    3. Erectile dysfunction following radical prostatectomy    4. Post-operative state    5. Encounter for Nichols catheter removal        Plan:     I spent 20 minutes with the patient of which more than half was spent in direct consultation with the patient in regards to our treatment and plan.    Education and recommendations of today's plan of care including home remedies.  Consult Dr. Lopez for penile rehab    [facility]  RTC one month with new PSA.  Patient was informed of the post operative pathology report is still processing.  Answered all his questions. We discussed his post op expectations. This month his focus is on Kegel exercises as instructed by the nurse. Informed only 4 times at day at 10 reps. No more, no less. This is a muscle that has to exercised but also rest to get stronger.    Also emphasized the importance of his activity restrictions. No heavy lifting. Nothing greater than 15 lbs or greater than a gallon of milk. He has had a major surgery and his body needs to heal on inside as well as the outside. Daily walks as well as heart healthy low fat diets were encouraged.     We discussed his daily care of his surgical  Wound sites. Discussed avoiding certain movements and stretches that put strain on the sites. He voiced understanding.    We will reevaluate his bladder control at his next visit. We will also discuss penile rehabilitation to help with his recover and return of his erections. Both of these issues vary on response with each patient. But not to worry he is in healing process, and we will handle each issue as they come along. He voices understanding.    This next visit we will get his first PSA. Informed  that we will set up a schedule of regular visits and PSA checks. These visits are at one month, then at 4 months, then every 6 months until 5 years after initial surgery.     After 5 years, the visits and PSA checks will be yearly up to 10 years post operatively. Informed these exams are important because we are monitoring for the possibility of reoccurance. The prostate and cancer was removed, but there is a chance that a microscopic cell was left behind. The surgeon can only remove what he can see. Monitoring the PSA as well as BRYAN's helps to discover this early if it happens.  Voices understanding. Call us with any questions. We will see him back in 1 month or PRN with any concerns.

## 2019-12-09 DIAGNOSIS — N40.1 BPH WITH URINARY OBSTRUCTION: ICD-10-CM

## 2019-12-09 DIAGNOSIS — N13.8 BPH WITH URINARY OBSTRUCTION: ICD-10-CM

## 2019-12-09 DIAGNOSIS — I10 ESSENTIAL HYPERTENSION: ICD-10-CM

## 2019-12-09 LAB
FINAL PATHOLOGIC DIAGNOSIS: NORMAL
GROSS: NORMAL
MICROSCOPIC EXAM: NORMAL

## 2019-12-09 RX ORDER — LOSARTAN POTASSIUM AND HYDROCHLOROTHIAZIDE 12.5; 5 MG/1; MG/1
TABLET ORAL
Qty: 90 TABLET | Refills: 3 | Status: SHIPPED | OUTPATIENT
Start: 2019-12-09 | End: 2021-02-17 | Stop reason: SDUPTHER

## 2019-12-09 RX ORDER — TAMSULOSIN HYDROCHLORIDE 0.4 MG/1
CAPSULE ORAL
Qty: 90 CAPSULE | Refills: 0 | OUTPATIENT
Start: 2019-12-09

## 2020-01-14 ENCOUNTER — LAB VISIT (OUTPATIENT)
Dept: LAB | Facility: HOSPITAL | Age: 60
End: 2020-01-14
Attending: NURSE PRACTITIONER
Payer: COMMERCIAL

## 2020-01-14 ENCOUNTER — PATIENT OUTREACH (OUTPATIENT)
Dept: ADMINISTRATIVE | Facility: OTHER | Age: 60
End: 2020-01-14

## 2020-01-14 DIAGNOSIS — Z46.6 ENCOUNTER FOR FOLEY CATHETER REMOVAL: ICD-10-CM

## 2020-01-14 DIAGNOSIS — N52.31 ERECTILE DYSFUNCTION FOLLOWING RADICAL PROSTATECTOMY: ICD-10-CM

## 2020-01-14 DIAGNOSIS — Z90.79 HISTORY OF ROBOT-ASSISTED LAPAROSCOPIC RADICAL PROSTATECTOMY: ICD-10-CM

## 2020-01-14 DIAGNOSIS — C61 PROSTATE CANCER: ICD-10-CM

## 2020-01-14 DIAGNOSIS — Z98.890 POST-OPERATIVE STATE: ICD-10-CM

## 2020-01-14 LAB — COMPLEXED PSA SERPL-MCNC: 0.02 NG/ML (ref 0–4)

## 2020-01-14 PROCEDURE — 84153 ASSAY OF PSA TOTAL: CPT

## 2020-01-14 PROCEDURE — 36415 COLL VENOUS BLD VENIPUNCTURE: CPT | Mod: PO

## 2020-01-16 ENCOUNTER — OFFICE VISIT (OUTPATIENT)
Dept: UROLOGY | Facility: CLINIC | Age: 60
End: 2020-01-16
Payer: COMMERCIAL

## 2020-01-16 VITALS
BODY MASS INDEX: 23.88 KG/M2 | HEART RATE: 86 BPM | SYSTOLIC BLOOD PRESSURE: 134 MMHG | BODY MASS INDEX: 23.88 KG/M2 | WEIGHT: 152.13 LBS | DIASTOLIC BLOOD PRESSURE: 98 MMHG | DIASTOLIC BLOOD PRESSURE: 98 MMHG | WEIGHT: 152.13 LBS | SYSTOLIC BLOOD PRESSURE: 134 MMHG | HEIGHT: 67 IN | HEIGHT: 67 IN | HEART RATE: 86 BPM

## 2020-01-16 DIAGNOSIS — C61 PROSTATE CANCER: ICD-10-CM

## 2020-01-16 DIAGNOSIS — N52.31 ERECTILE DYSFUNCTION FOLLOWING RADICAL PROSTATECTOMY: Primary | ICD-10-CM

## 2020-01-16 DIAGNOSIS — C61 PROSTATE CANCER: Primary | ICD-10-CM

## 2020-01-16 PROCEDURE — 99999 PR PBB SHADOW E&M-EST. PATIENT-LVL III: ICD-10-PCS | Mod: PBBFAC,,, | Performed by: UROLOGY

## 2020-01-16 PROCEDURE — 99244 OFF/OP CNSLTJ NEW/EST MOD 40: CPT | Mod: 24,S$GLB,, | Performed by: UROLOGY

## 2020-01-16 PROCEDURE — 99499 UNLISTED E&M SERVICE: CPT | Mod: S$GLB,,, | Performed by: UROLOGY

## 2020-01-16 PROCEDURE — 99499 NO LOS: ICD-10-PCS | Mod: S$GLB,,, | Performed by: UROLOGY

## 2020-01-16 PROCEDURE — 99999 PR PBB SHADOW E&M-EST. PATIENT-LVL III: CPT | Mod: PBBFAC,,, | Performed by: UROLOGY

## 2020-01-16 PROCEDURE — 99244 PR OFFICE CONSULTATION,LEVEL IV: ICD-10-PCS | Mod: 24,S$GLB,, | Performed by: UROLOGY

## 2020-01-16 RX ORDER — POLYETHYLENE GLYCOL 3350 17 G/17G
POWDER, FOR SOLUTION ORAL
Refills: 0 | COMMUNITY
Start: 2019-11-27 | End: 2020-01-16

## 2020-01-16 RX ORDER — HYOSCYAMINE SULFATE 0.12 MG/1
TABLET SUBLINGUAL
Refills: 1 | COMMUNITY
Start: 2019-11-19 | End: 2020-01-16

## 2020-01-16 RX ORDER — TADALAFIL 20 MG/1
TABLET ORAL
Qty: 30 TABLET | Refills: 11 | Status: SHIPPED | OUTPATIENT
Start: 2020-01-16 | End: 2020-09-03

## 2020-01-16 NOTE — PROGRESS NOTES
Clinic Note  1/16/2020      Subjective:         Chief Complaint:   HPI  Nilton Chin is a 59 y.o. male diagnosed with prostate cancer. Consult from Dr. Lopez.  Here with wife Maisha and daughter.  Negative family history.  [place] [employer]  robotic assisted laparoscopic prostatectomy 11/25, [id number], [facility] 3+4.  Continent and pad free. Doing Kegels. Continent and pad free since week 1.  Used no opioids used post op.     Stage-T2  PSA 4.4  Volume- 16 ccs (MRI)  MRI- 3.2 cm RPZ PI-RADS 5 lesion. Negative extra prostatic extension, NVB, Bladder, seminal vesicle, nodes.  Biopsy- 10/4 [facility] 8 right middle 2/2 60%; [facility] 3+4 left apex 2/2 50%, right base 1/1 60%, right apex 2/2 60%; [facility] 6 2/2 50%.  ADELE score- 5  NCCN- high risk      Lab Results   Component Value Date    PSA 4.4 (H) 08/14/2019    PSA 3.5 08/08/2018    PSA 3.3 08/08/2017    PSA 2.3 06/02/2016    PSA 0.80 11/05/2012    PSA 2.26 09/27/2012    PSA 1.1 07/15/2011    PSA 0.95 10/29/2009    PSA 0.74 10/08/2008    PSADIAG 0.02 01/14/2020    PSADIAG 3.0 11/07/2018    PSADIAG 2.8 11/01/2017    PSADIAG 2.3 10/26/2016    PSADIAG 2.2 09/17/2015    PSADIAG 1.9 09/17/2014    PSADIAG 1.2 09/04/2013      Past Medical History:   Diagnosis Date    Allergy     BPH (benign prostatic hypertrophy)     History of prostatitis     Hypertension      Family History   Problem Relation Age of Onset    Cataracts Mother     Hypertension Mother     Cataracts Father     Glaucoma Father     Hypertension Father     Blindness Other     Cancer Paternal Grandfather         lung/prostate    Diabetes Paternal Grandfather     Diabetes Maternal Grandmother     Heart disease Maternal Grandmother     Amblyopia Neg Hx     Strabismus Neg Hx     Retinal detachment Neg Hx     Macular degeneration Neg Hx     Stroke Neg Hx     Melanoma Neg Hx      Social History     Socioeconomic History    Marital status:      Spouse name: Not on file     "Number of children: 3    Years of education: Not on file    Highest education level: Not on file   Occupational History    Occupation:    Social Needs    Financial resource strain: Not on file    Food insecurity:     Worry: Not on file     Inability: Not on file    Transportation needs:     Medical: Not on file     Non-medical: Not on file   Tobacco Use    Smoking status: Former Smoker     Years: 35.00     Types: Cigarettes     Start date: 6/24/2013    Smokeless tobacco: Never Used    Tobacco comment: 6 cig a day   Substance and Sexual Activity    Alcohol use: Yes     Comment: socially/ not weekly    Drug use: No    Sexual activity: Yes     Partners: Female   Lifestyle    Physical activity:     Days per week: Not on file     Minutes per session: Not on file    Stress: Not on file   Relationships    Social connections:     Talks on phone: Not on file     Gets together: Not on file     Attends Church service: Not on file     Active member of club or organization: Not on file     Attends meetings of clubs or organizations: Not on file     Relationship status: Not on file   Other Topics Concern    Not on file   Social History Narrative    Not on file     Past Surgical History:   Procedure Laterality Date    PROSTATE BIOPSY      ROBOT-ASSISTED LAPAROSCOPIC PROSTATECTOMY USING DA EMELY XI N/A 11/25/2019    Procedure: XI ROBOTIC PROSTATECTOMY;  Surgeon: Jose Carlos Lawrence MD;  Location: [facility] OR [address] [place] [address];  Service: Urology;  Laterality: N/A;  3hrs gen with regional      Patient Active Problem List   Diagnosis    Cervical radiculopathy    Hypertension    Cervical neuropathy    Iliotibial band syndrome affecting lower leg    Anal fissure    Other hyperlipidemia    Prostate cancer    Erectile dysfunction following radical prostatectomy     Review of Systems      Objective:      BP (!) 134/98   Pulse 86   Ht 5' 7" (1.702 m)   Wt 69 kg (152 lb 1.9 oz)   BMI 23.82 kg/m²   Estimated " "body mass index is 23.82 kg/m² as calculated from the following:    Height as of this encounter: 5' 7" (1.702 m).    Weight as of this encounter: 69 kg (152 lb 1.9 oz).  Physical Exam   Abdominal:   Incisions healing well, no erythema or other evidence of infection.          Assessment and Plan:           Problem List Items Addressed This Visit     Prostate cancer - Primary          Follow up:   6 months with PSA.    Jose Carlos Lawrence        "

## 2020-01-16 NOTE — LETTER
January 16, 2020        Conchita Mejia NP  [address] [facility] [address]             [facility]  [address]  [place] [address]  Phone: [phone]   Patient: Nilton Chin   MR Number: [id number]   YOB: 1960   Date of Visit: 1/16/2020       Dear Dr. Mejia:    Thank you for referring Nilton Chin to me for evaluation. Attached you will find relevant portions of my assessment and plan of care.    If you have questions, please do not hesitate to call me. I look forward to following Nilton Chin along with you.    Sincerely,      Behzad Lopez MD            CC  No Recipients    Enclosure

## 2020-01-16 NOTE — PROGRESS NOTES
Mr. Chin is a 59 y.o. male presenting for a consultation at the request of Dr. Lawrence. Patient presents with ED.    PRESENTING ILLNESS:    Nilton Chin is a 59 y.o. male with prostate cancer s/p RALP on 11/25/19 by Dr. Lawrence.  Since then, he c/o ED.  Prior to surgery, he did not have ED.    He does not have JUAN.  He is wearing 0 pads/day.  No hematuria.  No dysuria.  Good FOS.    No bone pain or weight loss.    REVIEW OF SYSTEMS:    Nilton Chin denies headache, blurred vision, fever, nausea, vomiting, chills, abdominal pain, chest pain, sore throat, bleeding per rectum, cough, SOB, recent loss of consciousness, recent mental status changes, seizures, dizziness, or upper or lower extremity weakness.    POLO  1. 1  2. 0  3. 0  4. 0  5. 0      PATIENT HISTORY:    Past Medical History:   Diagnosis Date    Allergy     BPH (benign prostatic hypertrophy)     History of prostatitis     Hypertension        Family History   Problem Relation Age of Onset    Cataracts Mother     Hypertension Mother     Cataracts Father     Glaucoma Father     Hypertension Father     Blindness Other     Cancer Paternal Grandfather         lung/prostate    Diabetes Paternal Grandfather     Diabetes Maternal Grandmother     Heart disease Maternal Grandmother     Amblyopia Neg Hx     Strabismus Neg Hx     Retinal detachment Neg Hx     Macular degeneration Neg Hx     Stroke Neg Hx     Melanoma Neg Hx        Past Surgical History:   Procedure Laterality Date    PROSTATE BIOPSY      ROBOT-ASSISTED LAPAROSCOPIC PROSTATECTOMY USING DA EMELY XI N/A 11/25/2019    Procedure: XI ROBOTIC PROSTATECTOMY;  Surgeon: Jose Carlos Lawrence MD;  Location: [facility] OR [address] [place] [address];  Service: Urology;  Laterality: N/A;  3hrs gen with regional        Social History     Socioeconomic History    Marital status:      Spouse name: Not on file    Number of children: 3    Years of education: Not on file    Highest education level: Not on file    Occupational History    Occupation:    Social Needs    Financial resource strain: Not on file    Food insecurity:     Worry: Not on file     Inability: Not on file    Transportation needs:     Medical: Not on file     Non-medical: Not on file   Tobacco Use    Smoking status: Former Smoker     Years: 35.00     Types: Cigarettes     Start date: 6/24/2013    Smokeless tobacco: Never Used    Tobacco comment: 6 cig a day   Substance and Sexual Activity    Alcohol use: Yes     Comment: socially/ not weekly    Drug use: No    Sexual activity: Yes     Partners: Female   Lifestyle    Physical activity:     Days per week: Not on file     Minutes per session: Not on file    Stress: Not on file   Relationships    Social connections:     Talks on phone: Not on file     Gets together: Not on file     Attends Restorationist service: Not on file     Active member of club or organization: Not on file     Attends meetings of clubs or organizations: Not on file     Relationship status: Not on file   Other Topics Concern    Not on file   Social History Narrative    Not on file       Review of patient's allergies indicates:   Allergen Reactions    Codeine Nausea Only    Vicodin [hydrocodone-acetaminophen]      unknown         Current Outpatient Medications:     acetaminophen (TYLENOL) 500 MG tablet, Take 2 tablets (1,000 mg total) by mouth every 6 (six) hours., Disp: 30 tablet, Rfl: 0    fluticasone propionate (FLONASE) 50 mcg/actuation nasal spray, 1 spray (50 mcg total) by Each Nostril route 2 (two) times daily., Disp: 1 Bottle, Rfl: 0    hyoscyamine (LEVSIN/SL) 0.125 mg Subl, DIS 1 T UNT  Q 12 H PRF CRAMPS, Disp: , Rfl: 1    ibuprofen (ADVIL,MOTRIN) 200 MG tablet, Take 200 mg by mouth every 6 (six) hours as needed for Pain., Disp: , Rfl:     losartan-hydrochlorothiazide 50-12.5 mg (HYZAAR) 50-12.5 mg per tablet, TAKE 1 TABLET BY MOUTH EVERY DAY, Disp: 90 tablet, Rfl: 3    menthol (RICOLA) 1.1 mg  T[place]ha, by Mucous Membrane route., Disp: , Rfl:     polyethylene glycol (GLYCOLAX) 17 gram/dose powder, , Disp: , Rfl: 0      PHYSICAL EXAMINATION:    The patient generally appears in good health, is appropriately interactive, and is in no apparent distress.     Eyes: anicteric sclerae, moist conjunctivae; no lid-lag; PERRLA     HENT: Atraumatic; oropharynx clear with moist mucous membranes and no mucosal ulcerations;normal hard and soft palate. No evidence of lymphadenopathy.    Neck: Trachea midline.  No thyromegaly.    Musculoskeletal: No abnormal gait.    Skin: No lesions.    Mental: Cooperative with normal affect.  Is oriented to time, place, and person.    Neuro: Grossly intact.    Chest: Normal inspiratory effort.   No accessory muscles.  No audible wheezes.  Respirations symmetric on inspiration and expiration.    Heart: Regular rhythm.      Abdomen:  Soft, non-tender. No masses or organomegaly. Bladder is not palpable. No evidence of flank discomfort. No evidence of inguinal hernia.    Genitourinary: The penis is circumcised with no evidence of plaques or induration. The urethral meatus is normal. The testes, epididymides, and cord structures are normal in size and contour bilaterally. The scrotum is normal in size and contour.    Extremities: No clubbing, cyanosis, or edema        LABS:      Lab Results   Component Value Date    PSA 4.4 (H) 08/14/2019    PSA 3.5 08/08/2018    PSA 3.3 08/08/2017    PSADIAG 0.02 01/14/2020    PSADIAG 3.0 11/07/2018    PSADIAG 2.8 11/01/2017        IMPRESSION:    Encounter Diagnoses   Name Primary?    Erectile dysfunction following radical prostatectomy Yes    Prostate cancer        PLAN:    1. Discussed penile rehab today.  The risks, benefits, and the evidence was discussed with him today.  We discussed different options.  He would like to try Cialis.  Side effects discussed.  A new Rx was given.  2. RTC 3 months to re-evaluate.    Copy to: Howard

## 2020-01-28 ENCOUNTER — PATIENT OUTREACH (OUTPATIENT)
Dept: ADMINISTRATIVE | Facility: OTHER | Age: 60
End: 2020-01-28

## 2020-01-29 ENCOUNTER — OFFICE VISIT (OUTPATIENT)
Dept: OPTOMETRY | Facility: CLINIC | Age: 60
End: 2020-01-29
Payer: COMMERCIAL

## 2020-01-29 DIAGNOSIS — Z83.511 FAMILY HISTORY OF GLAUCOMA: ICD-10-CM

## 2020-01-29 DIAGNOSIS — H52.4 PRESBYOPIA: ICD-10-CM

## 2020-01-29 DIAGNOSIS — H04.123 DRY EYES, BILATERAL: Primary | ICD-10-CM

## 2020-01-29 PROCEDURE — 92014 PR EYE EXAM, EST PATIENT,COMPREHESV: ICD-10-PCS | Mod: S$GLB,,, | Performed by: OPTOMETRIST

## 2020-01-29 PROCEDURE — 92015 DETERMINE REFRACTIVE STATE: CPT | Mod: S$GLB,,, | Performed by: OPTOMETRIST

## 2020-01-29 PROCEDURE — 92014 COMPRE OPH EXAM EST PT 1/>: CPT | Mod: S$GLB,,, | Performed by: OPTOMETRIST

## 2020-01-29 PROCEDURE — 99999 PR PBB SHADOW E&M-EST. PATIENT-LVL II: ICD-10-PCS | Mod: PBBFAC,,, | Performed by: OPTOMETRIST

## 2020-01-29 PROCEDURE — 99999 PR PBB SHADOW E&M-EST. PATIENT-LVL II: CPT | Mod: PBBFAC,,, | Performed by: OPTOMETRIST

## 2020-01-29 PROCEDURE — 92015 PR REFRACTION: ICD-10-PCS | Mod: S$GLB,,, | Performed by: OPTOMETRIST

## 2020-01-29 NOTE — PROGRESS NOTES
HPI     DLS  12/04/2018  HX     Dry Eyes  Pt here for Annual eye exam. Pt wears OTC +2.50  Distance vision clear.  GLARE+  Floaters-  Flashes-      Last edited by Salazar Rios on 1/29/2020  9:07 AM. (History)        ROS     Positive for: Skin (rosacea), Eyes (ocular rosacea hx)    Negative for: Constitutional, Gastrointestinal, Neurological,   Genitourinary, Musculoskeletal, HENT, Endocrine, Cardiovascular,   Respiratory, Psychiatric, Allergic/Imm, Heme/Lymph    Last edited by Arnulfo Blake, OD on 1/29/2020  9:16 AM. (History)        Assessment /Plan     For exam results, see Encounter Report.    Dry eyes, bilateral    Family history of glaucoma    Presbyopia      1. hyp/Presby--pt happy w otc readers  2. Family History of Glaucoma.  Advised yearly exam   3. Ocular rosacea hx--see prev notes.  Pt doing well on otc SYSTANE BAL ATs QID for now      PLAN:    rtc 1 yr

## 2020-02-17 ENCOUNTER — PATIENT MESSAGE (OUTPATIENT)
Dept: ADMINISTRATIVE | Facility: OTHER | Age: 60
End: 2020-02-17

## 2020-03-31 ENCOUNTER — PATIENT MESSAGE (OUTPATIENT)
Dept: UROLOGY | Facility: CLINIC | Age: 60
End: 2020-03-31

## 2020-04-09 ENCOUNTER — PATIENT OUTREACH (OUTPATIENT)
Dept: ADMINISTRATIVE | Facility: OTHER | Age: 60
End: 2020-04-09

## 2020-04-13 ENCOUNTER — OFFICE VISIT (OUTPATIENT)
Dept: UROLOGY | Facility: CLINIC | Age: 60
End: 2020-04-13
Payer: COMMERCIAL

## 2020-04-13 DIAGNOSIS — C61 PROSTATE CANCER: ICD-10-CM

## 2020-04-13 DIAGNOSIS — I10 ESSENTIAL HYPERTENSION: Chronic | ICD-10-CM

## 2020-04-13 DIAGNOSIS — N52.31 ERECTILE DYSFUNCTION FOLLOWING RADICAL PROSTATECTOMY: Primary | ICD-10-CM

## 2020-04-13 DIAGNOSIS — E78.49 OTHER HYPERLIPIDEMIA: ICD-10-CM

## 2020-04-13 PROCEDURE — 99214 OFFICE O/P EST MOD 30 MIN: CPT | Mod: 95,,, | Performed by: UROLOGY

## 2020-04-13 PROCEDURE — 99214 PR OFFICE/OUTPT VISIT, EST, LEVL IV, 30-39 MIN: ICD-10-PCS | Mod: 95,,, | Performed by: UROLOGY

## 2020-04-13 NOTE — LETTER
April 13, 2020      Conchita Mejia, EMELY  [address] [facility] [address]  [facility] [address]           [facility] - Urology 4th Floor  [address]  [facility] [address]  Phone: [phone]          Patient: Nilton Chin   MR Number: [id number]   YOB: 1960   Date of Visit: 4/13/2020       Dear Conchita Mejia:    Thank you for referring Nilton Chin to me for evaluation. Attached you will find relevant portions of my assessment and plan of care.    If you have questions, please do not hesitate to call me. I look forward to following Nilton Chin along with you.    Sincerely,    Behzad Lopez MD    Enclosure  CC:  No Recipients    If you would like to receive this communication electronically, please contact [email or web address] or [phone] to request more information on [employer] access.    For providers and/or their staff who would like to refer a patient to [facility], please contact us through our one-stop-shop provider referral line, [facility], at [phone].    If you feel you have received this communication in error or would no longer like to receive these types of communications, please e-mail [email or web address]

## 2020-04-13 NOTE — PROGRESS NOTES
The patient location is: Home  The chief complaint leading to consultation is: ED  Visit type: Virtual visit with synchronous audio and video  Total time spent with patient: 11 min  Each patient to whom he or she provides medical services by telemedicine is:  (1) informed of the relationship between the physician and patient and the respective role of any other health care provider with respect to management of the patient; and (2) notified that he or she may decline to receive medical services by telemedicine and may withdraw from such care at any time.    Notes: Mr. Chin is a 60 y.o. with prostate cancer s/p RALP on 11/25/19 by Dr. Lawrence.  Since then, he c/o ED.  Prior to surgery, he did not have ED.  He's been taking Cialis with a soft erection.  Not satisfactory for penetration.    He does not have JUAN.  He is wearing 0 pads/day.  No hematuria.  No dysuria.  Good FOS.    No bone pain or weight loss.      ROS:  Nilton Chin denies chest pain, sore throat, SOB, fever/chills, N/V.  The remainder of the ROS is negative except what is in the HPI and PMH.    Assessment:  Encounter Diagnoses   Name Primary?    Erectile dysfunction following radical prostatectomy Yes    Prostate cancer     Other hyperlipidemia     Essential hypertension    HTN, controlled  Hyperlipidemia, controlled    Plan:  1. Discussed optiosn for his ED.  He'd like to continue Cialis for now.  2. RTC 2 months.

## 2020-05-29 ENCOUNTER — PATIENT MESSAGE (OUTPATIENT)
Dept: UROLOGY | Facility: CLINIC | Age: 60
End: 2020-05-29

## 2020-06-01 ENCOUNTER — PATIENT OUTREACH (OUTPATIENT)
Dept: ADMINISTRATIVE | Facility: OTHER | Age: 60
End: 2020-06-01

## 2020-06-01 ENCOUNTER — TELEPHONE (OUTPATIENT)
Dept: UROLOGY | Facility: CLINIC | Age: 60
End: 2020-06-01

## 2020-06-01 NOTE — TELEPHONE ENCOUNTER
Returned call to pt. Pt reports he is feeling better. Pain was not severe and it is not swollen. He was checking in with  due to concern. Pt will f/u at office visit as scheduled on Wednesday.

## 2020-06-03 ENCOUNTER — OFFICE VISIT (OUTPATIENT)
Dept: UROLOGY | Facility: CLINIC | Age: 60
End: 2020-06-03
Payer: COMMERCIAL

## 2020-06-03 VITALS
HEIGHT: 67 IN | SYSTOLIC BLOOD PRESSURE: 135 MMHG | BODY MASS INDEX: 24.4 KG/M2 | HEART RATE: 81 BPM | WEIGHT: 155.44 LBS | DIASTOLIC BLOOD PRESSURE: 92 MMHG

## 2020-06-03 DIAGNOSIS — E78.49 OTHER HYPERLIPIDEMIA: ICD-10-CM

## 2020-06-03 DIAGNOSIS — I10 ESSENTIAL HYPERTENSION: Chronic | ICD-10-CM

## 2020-06-03 DIAGNOSIS — C61 PROSTATE CANCER: ICD-10-CM

## 2020-06-03 DIAGNOSIS — N52.31 ERECTILE DYSFUNCTION FOLLOWING RADICAL PROSTATECTOMY: Primary | ICD-10-CM

## 2020-06-03 PROCEDURE — 99214 PR OFFICE/OUTPT VISIT, EST, LEVL IV, 30-39 MIN: ICD-10-PCS | Mod: S$GLB,,, | Performed by: UROLOGY

## 2020-06-03 PROCEDURE — 3075F PR MOST RECENT SYSTOLIC BLOOD PRESS GE 130-139MM HG: ICD-10-PCS | Mod: CPTII,S$GLB,, | Performed by: UROLOGY

## 2020-06-03 PROCEDURE — 3080F DIAST BP >= 90 MM HG: CPT | Mod: CPTII,S$GLB,, | Performed by: UROLOGY

## 2020-06-03 PROCEDURE — 99999 PR PBB SHADOW E&M-EST. PATIENT-LVL III: ICD-10-PCS | Mod: PBBFAC,,, | Performed by: UROLOGY

## 2020-06-03 PROCEDURE — 3008F PR BODY MASS INDEX (BMI) DOCUMENTED: ICD-10-PCS | Mod: CPTII,S$GLB,, | Performed by: UROLOGY

## 2020-06-03 PROCEDURE — 99214 OFFICE O/P EST MOD 30 MIN: CPT | Mod: S$GLB,,, | Performed by: UROLOGY

## 2020-06-03 PROCEDURE — 99999 PR PBB SHADOW E&M-EST. PATIENT-LVL III: CPT | Mod: PBBFAC,,, | Performed by: UROLOGY

## 2020-06-03 PROCEDURE — 3075F SYST BP GE 130 - 139MM HG: CPT | Mod: CPTII,S$GLB,, | Performed by: UROLOGY

## 2020-06-03 PROCEDURE — 3080F PR MOST RECENT DIASTOLIC BLOOD PRESSURE >= 90 MM HG: ICD-10-PCS | Mod: CPTII,S$GLB,, | Performed by: UROLOGY

## 2020-06-03 PROCEDURE — 3008F BODY MASS INDEX DOCD: CPT | Mod: CPTII,S$GLB,, | Performed by: UROLOGY

## 2020-06-03 RX ORDER — MELOXICAM 15 MG/1
TABLET ORAL
COMMUNITY
Start: 2020-05-17 | End: 2022-08-29

## 2020-06-03 NOTE — PROGRESS NOTES
Mr. Chin is a 60 y.o. male presenting with ED.    PRESENTING ILLNESS:    Nilton Chin is a 60 y.o. male with prostate cancer s/p RALP on 11/25/19 by Dr. Lawrence.  Since then, he c/o ED.  Prior to surgery, he did not have ED.  He's been taking Cialis with a soft erection.  Not satisfactory for penetration.    He does not have JUAN.  He is wearing 0 pads/day.  No hematuria.  No dysuria.  Good FOS.    No bone pain or weight loss.    REVIEW OF SYSTEMS:    Nilton Chin denies headache, blurred vision, fever, nausea, vomiting, chills, abdominal pain, chest pain, sore throat, bleeding per rectum, cough, SOB, recent loss of consciousness, recent mental status changes, seizures, dizziness, or upper or lower extremity weakness.    POLO  1. 1  2. 1  3. 1  4. 1  5. 2     PATIENT HISTORY:    Past Medical History:   Diagnosis Date    Allergy     BPH (benign prostatic hypertrophy)     History of prostatitis     Hypertension        Family History   Problem Relation Age of Onset    Cataracts Mother     Hypertension Mother     Cataracts Father     Glaucoma Father     Hypertension Father     Blindness Other     Cancer Paternal Grandfather         lung/prostate    Diabetes Paternal Grandfather     Diabetes Maternal Grandmother     Heart disease Maternal Grandmother     Amblyopia Neg Hx     Strabismus Neg Hx     Retinal detachment Neg Hx     Macular degeneration Neg Hx     Stroke Neg Hx     Melanoma Neg Hx        Past Surgical History:   Procedure Laterality Date    PROSTATE BIOPSY      ROBOT-ASSISTED LAPAROSCOPIC PROSTATECTOMY USING DA EMELY XI N/A 11/25/2019    Procedure: XI ROBOTIC PROSTATECTOMY;  Surgeon: Jose Carlos Lawrence MD;  Location: [facility] OR [address] [place] [address];  Service: Urology;  Laterality: N/A;  3hrs gen with regional        Social History     Socioeconomic History    Marital status:      Spouse name: Not on file    Number of children: 3    Years of education: Not on file    Highest education  level: Not on file   Occupational History    Occupation:    Social Needs    Financial resource strain: Not on file    Food insecurity:     Worry: Not on file     Inability: Not on file    Transportation needs:     Medical: Not on file     Non-medical: Not on file   Tobacco Use    Smoking status: Former Smoker     Years: 35.00     Types: Cigarettes     Start date: 6/24/2013    Smokeless tobacco: Never Used    Tobacco comment: 6 cig a day   Substance and Sexual Activity    Alcohol use: Yes     Comment: socially/ not weekly    Drug use: No    Sexual activity: Yes     Partners: Female   Lifestyle    Physical activity:     Days per week: Not on file     Minutes per session: Not on file    Stress: Not on file   Relationships    Social connections:     Talks on phone: Not on file     Gets together: Not on file     Attends Baptism service: Not on file     Active member of club or organization: Not on file     Attends meetings of clubs or organizations: Not on file     Relationship status: Not on file   Other Topics Concern    Not on file   Social History Narrative    Not on file       Review of patient's allergies indicates:   Allergen Reactions    Codeine Nausea Only    Vicodin [hydrocodone-acetaminophen]      unknown         Current Outpatient Medications:     fluticasone propionate (FLONASE) 50 mcg/actuation nasal spray, 1 spray (50 mcg total) by Each Nostril route 2 (two) times daily., Disp: 1 Bottle, Rfl: 0    losartan-hydrochlorothiazide 50-12.5 mg (HYZAAR) 50-12.5 mg per tablet, TAKE 1 TABLET BY MOUTH EVERY DAY, Disp: 90 tablet, Rfl: 3    meloxicam (MOBIC) 15 MG tablet, TK 1 T PO  ONCE D, Disp: , Rfl:     menthol (RICOLA) 1.1 mg Lozg, by Mucous Membrane route., Disp: , Rfl:     tadalafil (CIALIS) 20 MG Tab, 1 PO QOD, Disp: 30 tablet, Rfl: 11      PHYSICAL EXAMINATION:    The patient generally appears in good health, is appropriately interactive, and is in no apparent distress.      Eyes: anicteric sclerae, moist conjunctivae; no lid-lag; PERRLA     HENT: Atraumatic; oropharynx clear with moist mucous membranes and no mucosal ulcerations;normal hard and soft palate. No evidence of lymphadenopathy.    Neck: Trachea midline.  No thyromegaly.    Musculoskeletal: No abnormal gait.    Skin: No lesions.    Mental: Cooperative with normal affect.  Is oriented to time, place, and person.    Neuro: Grossly intact.    Chest: Normal inspiratory effort.   No accessory muscles.  No audible wheezes.  Respirations symmetric on inspiration and expiration.    Heart: Regular rhythm.      Abdomen:  Soft, non-tender. No masses or organomegaly. Bladder is not palpable. No evidence of flank discomfort. No evidence of inguinal hernia.    Genitourinary: The penis is circumcised with no evidence of plaques or induration. The urethral meatus is normal. The testes, epididymides, and cord structures are normal in size and contour bilaterally. The scrotum is normal in size and contour.    Extremities: No clubbing, cyanosis, or edema        LABS:      Lab Results   Component Value Date    PSA 4.4 (H) 08/14/2019    PSA 3.5 08/08/2018    PSA 3.3 08/08/2017    PSADIAG 0.02 01/14/2020    PSADIAG 3.0 11/07/2018    PSADIAG 2.8 11/01/2017        IMPRESSION:    Encounter Diagnoses   Name Primary?    Erectile dysfunction following radical prostatectomy Yes    Prostate cancer     Essential hypertension     Other hyperlipidemia    HTN, controlled  Hyperlipidemia, controlled    PLAN:    1. Discussed options for his ED.  He'd like to continue Cialis for now.  He was given info on [facility] and will think about it.  2. RTC 3 months.    Copy to:

## 2020-06-18 ENCOUNTER — TELEPHONE (OUTPATIENT)
Dept: INTERNAL MEDICINE | Facility: CLINIC | Age: 60
End: 2020-06-18

## 2020-06-18 DIAGNOSIS — Z00.00 ANNUAL PHYSICAL EXAM: Primary | ICD-10-CM

## 2020-06-18 NOTE — TELEPHONE ENCOUNTER
----- Message from Ofelia Mckeon sent at 6/18/2020  2:38 PM CDT -----  Doctor appointment and lab have been scheduled.  Please link lab orders to the lab appointment.  Date of doctor appointment:  8/21  Date of lab appointment:  8/14  Physical or EP: PHYS  Comments:

## 2020-07-14 ENCOUNTER — LAB VISIT (OUTPATIENT)
Dept: LAB | Facility: HOSPITAL | Age: 60
End: 2020-07-14
Attending: INTERNAL MEDICINE
Payer: COMMERCIAL

## 2020-07-14 DIAGNOSIS — C61 PROSTATE CANCER: ICD-10-CM

## 2020-07-14 LAB — COMPLEXED PSA SERPL-MCNC: 0.14 NG/ML (ref 0–4)

## 2020-07-14 PROCEDURE — 36415 COLL VENOUS BLD VENIPUNCTURE: CPT | Mod: PO

## 2020-07-14 PROCEDURE — 84153 ASSAY OF PSA TOTAL: CPT

## 2020-07-15 ENCOUNTER — PATIENT OUTREACH (OUTPATIENT)
Dept: ADMINISTRATIVE | Facility: OTHER | Age: 60
End: 2020-07-15

## 2020-07-16 ENCOUNTER — OFFICE VISIT (OUTPATIENT)
Dept: UROLOGY | Facility: CLINIC | Age: 60
End: 2020-07-16
Payer: COMMERCIAL

## 2020-07-16 VITALS
HEART RATE: 66 BPM | DIASTOLIC BLOOD PRESSURE: 91 MMHG | WEIGHT: 158.75 LBS | SYSTOLIC BLOOD PRESSURE: 317 MMHG | BODY MASS INDEX: 24.92 KG/M2 | HEIGHT: 67 IN

## 2020-07-16 DIAGNOSIS — C61 PROSTATE CANCER: Primary | ICD-10-CM

## 2020-07-16 PROCEDURE — 3008F BODY MASS INDEX DOCD: CPT | Mod: CPTII,S$GLB,, | Performed by: UROLOGY

## 2020-07-16 PROCEDURE — 99214 OFFICE O/P EST MOD 30 MIN: CPT | Mod: S$GLB,,, | Performed by: UROLOGY

## 2020-07-16 PROCEDURE — 3080F DIAST BP >= 90 MM HG: CPT | Mod: CPTII,S$GLB,, | Performed by: UROLOGY

## 2020-07-16 PROCEDURE — 3008F PR BODY MASS INDEX (BMI) DOCUMENTED: ICD-10-PCS | Mod: CPTII,S$GLB,, | Performed by: UROLOGY

## 2020-07-16 PROCEDURE — 99214 PR OFFICE/OUTPT VISIT, EST, LEVL IV, 30-39 MIN: ICD-10-PCS | Mod: S$GLB,,, | Performed by: UROLOGY

## 2020-07-16 PROCEDURE — 99999 PR PBB SHADOW E&M-EST. PATIENT-LVL III: ICD-10-PCS | Mod: PBBFAC,,, | Performed by: UROLOGY

## 2020-07-16 PROCEDURE — 3077F PR MOST RECENT SYSTOLIC BLOOD PRESSURE >= 140 MM HG: ICD-10-PCS | Mod: CPTII,S$GLB,, | Performed by: UROLOGY

## 2020-07-16 PROCEDURE — 3080F PR MOST RECENT DIASTOLIC BLOOD PRESSURE >= 90 MM HG: ICD-10-PCS | Mod: CPTII,S$GLB,, | Performed by: UROLOGY

## 2020-07-16 PROCEDURE — 99999 PR PBB SHADOW E&M-EST. PATIENT-LVL III: CPT | Mod: PBBFAC,,, | Performed by: UROLOGY

## 2020-07-16 PROCEDURE — 3077F SYST BP >= 140 MM HG: CPT | Mod: CPTII,S$GLB,, | Performed by: UROLOGY

## 2020-07-16 NOTE — PROGRESS NOTES
Clinic Note  7/16/2020      Subjective:         Chief Complaint:   HPI  Nilton Chin is a 60 y.o. male diagnosed with prostate cancer. Consult from Dr. Lopez.  Here with wife Maisha and daughter.  Negative family history.  [place] [employer]  robotic assisted laparoscopic prostatectomy 11/25/2019- [id number], [place] 3+4.  Continent and pad free. Doing Kegels. Continent and pad free since week 1.  Used no opioids used post op.     Stage-T2  PSA 4.4  Volume- 16 ccs (MRI)  MRI- 3.2 cm RPZ PI-RADS 5 lesion. Negative extra prostatic extension, NVB, Bladder, seminal vesicle, nodes.  Biopsy- 10/4 [place] 8 right middle 2/2 60%; [place] 3+4 left apex 2/2 50%, right base 1/1 60%, right apex 2/2 60%; [place] 6 2/2 50%.  ADELE score- 5  NCCN- high risk         Lab Results   Component Value Date    PSA 4.4 (H) 08/14/2019    PSA 3.5 08/08/2018    PSA 3.3 08/08/2017    PSA 2.3 06/02/2016    PSA 0.80 11/05/2012    PSA 2.26 09/27/2012    PSA 1.1 07/15/2011    PSA 0.95 10/29/2009    PSA 0.74 10/08/2008    PSADIAG 0.14 07/14/2020    PSADIAG 0.02 01/14/2020    PSADIAG 3.0 11/07/2018    PSADIAG 2.8 11/01/2017    PSADIAG 2.3 10/26/2016    PSADIAG 2.2 09/17/2015    PSADIAG 1.9 09/17/2014    PSADIAG 1.2 09/04/2013      Past Medical History:   Diagnosis Date    Allergy     BPH (benign prostatic hypertrophy)     History of prostatitis     Hypertension      Family History   Problem Relation Age of Onset    Cataracts Mother     Hypertension Mother     Cataracts Father     Glaucoma Father     Hypertension Father     Blindness Other     Cancer Paternal Grandfather         lung/prostate    Diabetes Paternal Grandfather     Diabetes Maternal Grandmother     Heart disease Maternal Grandmother     Amblyopia Neg Hx     Strabismus Neg Hx     Retinal detachment Neg Hx     Macular degeneration Neg Hx     Stroke Neg Hx     Melanoma Neg Hx      Social History     Socioeconomic History    Marital status:       Spouse name: Not on file    Number of children: 3    Years of education: Not on file    Highest education level: Not on file   Occupational History    Occupation:    Social Needs    Financial resource strain: Not on file    Food insecurity     Worry: Not on file     Inability: Not on file    Transportation needs     Medical: Not on file     Non-medical: Not on file   Tobacco Use    Smoking status: Former Smoker     Years: 35.00     Types: Cigarettes     Start date: 6/24/2013    Smokeless tobacco: Never Used    Tobacco comment: 6 cig a day   Substance and Sexual Activity    Alcohol use: Yes     Comment: socially/ not weekly    Drug use: No    Sexual activity: Yes     Partners: Female   Lifestyle    Physical activity     Days per week: Not on file     Minutes per session: Not on file    Stress: Not on file   Relationships    Social connections     Talks on phone: Not on file     Gets together: Not on file     Attends Christian service: Not on file     Active member of club or organization: Not on file     Attends meetings of clubs or organizations: Not on file     Relationship status: Not on file   Other Topics Concern    Not on file   Social History Narrative    Not on file     Past Surgical History:   Procedure Laterality Date    PROSTATE BIOPSY      ROBOT-ASSISTED LAPAROSCOPIC PROSTATECTOMY USING DA EMELY XI N/A 11/25/2019    Procedure: XI ROBOTIC PROSTATECTOMY;  Surgeon: Jose Carlos Lawrence MD;  Location: [facility] OR [address] [place] [address];  Service: Urology;  Laterality: N/A;  3hrs gen with regional      Patient Active Problem List   Diagnosis    Cervical radiculopathy    Hypertension    Cervical neuropathy    Iliotibial band syndrome affecting lower leg    Anal fissure    Other hyperlipidemia    Prostate cancer    Erectile dysfunction following radical prostatectomy     Review of Systems   Constitutional: Negative for appetite change, chills, fatigue, fever and unexpected weight change.  "  HENT: Negative for nosebleeds.    Respiratory: Negative for shortness of breath and wheezing.    Cardiovascular: Negative for chest pain, palpitations and leg swelling.   Gastrointestinal: Negative for abdominal distention, abdominal pain, constipation, diarrhea, nausea and vomiting.   Genitourinary: Negative for dysuria, hematuria and urgency.   Musculoskeletal: Negative for arthralgias and back pain.   Skin: Negative for pallor.   Neurological: Negative for dizziness, seizures and syncope.   Hematological: Negative for adenopathy.   Psychiatric/Behavioral: Negative for dysphoric mood.         Objective:      There were no vitals taken for this visit.  Estimated body mass index is 24.34 kg/m² as calculated from the following:    Height as of 6/3/20: 5' 7" (1.702 m).    Weight as of 6/3/20: 70.5 kg (155 lb 6.8 oz).  Physical Exam   Constitutional: He is oriented to person, place, and time. He appears well-developed. No distress.   HENT:   Head: Atraumatic.   Neck: No tracheal deviation present.   Cardiovascular: Normal rate.    Pulmonary/Chest: Effort normal. No respiratory distress. He has no wheezes.   Abdominal: Soft. Bowel sounds are normal. He exhibits no distension and no mass. There is no abdominal tenderness. There is no rebound and no guarding.   Neurological: He is alert and oriented to person, place, and time.   Skin: Skin is warm and dry. He is not diaphoretic.     Psychiatric: His behavior is normal. Judgment and thought content normal.         Assessment and Plan:           Problem List Items Addressed This Visit     None          Follow up:   psa in 4 months  Jose Carlos Lawrence          "

## 2020-08-14 ENCOUNTER — LAB VISIT (OUTPATIENT)
Dept: LAB | Facility: HOSPITAL | Age: 60
End: 2020-08-14
Attending: INTERNAL MEDICINE
Payer: COMMERCIAL

## 2020-08-14 DIAGNOSIS — Z00.00 ANNUAL PHYSICAL EXAM: ICD-10-CM

## 2020-08-14 LAB
ALBUMIN SERPL BCP-MCNC: 4.3 G/DL (ref 3.5–5.2)
ALP SERPL-CCNC: 63 U/L (ref 55–135)
ALT SERPL W/O P-5'-P-CCNC: 16 U/L (ref 10–44)
ANION GAP SERPL CALC-SCNC: 8 MMOL/L (ref 8–16)
AST SERPL-CCNC: 19 U/L (ref 10–40)
BASOPHILS # BLD AUTO: 0.02 K/UL (ref 0–0.2)
BASOPHILS NFR BLD: 0.4 % (ref 0–1.9)
BILIRUB SERPL-MCNC: 1.4 MG/DL (ref 0.1–1)
BUN SERPL-MCNC: 19 MG/DL (ref 6–20)
CALCIUM SERPL-MCNC: 9.8 MG/DL (ref 8.7–10.5)
CHLORIDE SERPL-SCNC: 102 MMOL/L (ref 95–110)
CHOLEST SERPL-MCNC: 238 MG/DL (ref 120–199)
CHOLEST/HDLC SERPL: 4.3 {RATIO} (ref 2–5)
CO2 SERPL-SCNC: 29 MMOL/L (ref 23–29)
COMPLEXED PSA SERPL-MCNC: 0.16 NG/ML (ref 0–4)
CREAT SERPL-MCNC: 1.1 MG/DL (ref 0.5–1.4)
DIFFERENTIAL METHOD: ABNORMAL
EOSINOPHIL # BLD AUTO: 0.1 K/UL (ref 0–0.5)
EOSINOPHIL NFR BLD: 1.5 % (ref 0–8)
ERYTHROCYTE [DISTWIDTH] IN BLOOD BY AUTOMATED COUNT: 12 % (ref 11.5–14.5)
EST. GFR  (AFRICAN AMERICAN): >60 ML/MIN/1.73 M^2
EST. GFR  (NON AFRICAN AMERICAN): >60 ML/MIN/1.73 M^2
GLUCOSE SERPL-MCNC: 94 MG/DL (ref 70–110)
HCT VFR BLD AUTO: 43.8 % (ref 40–54)
HDLC SERPL-MCNC: 55 MG/DL (ref 40–75)
HDLC SERPL: 23.1 % (ref 20–50)
HGB BLD-MCNC: 14.2 G/DL (ref 14–18)
IMM GRANULOCYTES # BLD AUTO: 0.02 K/UL (ref 0–0.04)
IMM GRANULOCYTES NFR BLD AUTO: 0.4 % (ref 0–0.5)
LDLC SERPL CALC-MCNC: 164.8 MG/DL (ref 63–159)
LYMPHOCYTES # BLD AUTO: 1.4 K/UL (ref 1–4.8)
LYMPHOCYTES NFR BLD: 30.6 % (ref 18–48)
MCH RBC QN AUTO: 30.7 PG (ref 27–31)
MCHC RBC AUTO-ENTMCNC: 32.4 G/DL (ref 32–36)
MCV RBC AUTO: 95 FL (ref 82–98)
MONOCYTES # BLD AUTO: 0.4 K/UL (ref 0.3–1)
MONOCYTES NFR BLD: 8.7 % (ref 4–15)
NEUTROPHILS # BLD AUTO: 2.7 K/UL (ref 1.8–7.7)
NEUTROPHILS NFR BLD: 58.4 % (ref 38–73)
NONHDLC SERPL-MCNC: 183 MG/DL
NRBC BLD-RTO: 0 /100 WBC
PLATELET # BLD AUTO: 187 K/UL (ref 150–350)
PMV BLD AUTO: 13 FL (ref 9.2–12.9)
POTASSIUM SERPL-SCNC: 4 MMOL/L (ref 3.5–5.1)
PROT SERPL-MCNC: 7.3 G/DL (ref 6–8.4)
RBC # BLD AUTO: 4.63 M/UL (ref 4.6–6.2)
SODIUM SERPL-SCNC: 139 MMOL/L (ref 136–145)
TRIGL SERPL-MCNC: 91 MG/DL (ref 30–150)
TSH SERPL DL<=0.005 MIU/L-ACNC: 2.67 UIU/ML (ref 0.4–4)
WBC # BLD AUTO: 4.58 K/UL (ref 3.9–12.7)

## 2020-08-14 PROCEDURE — 84443 ASSAY THYROID STIM HORMONE: CPT

## 2020-08-14 PROCEDURE — 80053 COMPREHEN METABOLIC PANEL: CPT

## 2020-08-14 PROCEDURE — 36415 COLL VENOUS BLD VENIPUNCTURE: CPT | Mod: PO

## 2020-08-14 PROCEDURE — 80061 LIPID PANEL: CPT

## 2020-08-14 PROCEDURE — 84153 ASSAY OF PSA TOTAL: CPT

## 2020-08-14 PROCEDURE — 85025 COMPLETE CBC W/AUTO DIFF WBC: CPT

## 2020-08-21 ENCOUNTER — OFFICE VISIT (OUTPATIENT)
Dept: INTERNAL MEDICINE | Facility: CLINIC | Age: 60
End: 2020-08-21
Payer: COMMERCIAL

## 2020-08-21 VITALS
SYSTOLIC BLOOD PRESSURE: 122 MMHG | HEART RATE: 71 BPM | WEIGHT: 155.88 LBS | HEIGHT: 67 IN | DIASTOLIC BLOOD PRESSURE: 82 MMHG | RESPIRATION RATE: 17 BRPM | BODY MASS INDEX: 24.47 KG/M2 | TEMPERATURE: 99 F

## 2020-08-21 DIAGNOSIS — E78.49 OTHER HYPERLIPIDEMIA: ICD-10-CM

## 2020-08-21 DIAGNOSIS — C61 PROSTATE CANCER: ICD-10-CM

## 2020-08-21 DIAGNOSIS — Z00.00 ANNUAL PHYSICAL EXAM: Primary | ICD-10-CM

## 2020-08-21 DIAGNOSIS — I10 ESSENTIAL HYPERTENSION: Chronic | ICD-10-CM

## 2020-08-21 DIAGNOSIS — K60.2 ANAL FISSURE: ICD-10-CM

## 2020-08-21 PROCEDURE — 99396 PR PREVENTIVE VISIT,EST,40-64: ICD-10-PCS | Mod: S$GLB,,, | Performed by: INTERNAL MEDICINE

## 2020-08-21 PROCEDURE — 99396 PREV VISIT EST AGE 40-64: CPT | Mod: S$GLB,,, | Performed by: INTERNAL MEDICINE

## 2020-08-21 PROCEDURE — 3074F PR MOST RECENT SYSTOLIC BLOOD PRESSURE < 130 MM HG: ICD-10-PCS | Mod: CPTII,S$GLB,, | Performed by: INTERNAL MEDICINE

## 2020-08-21 PROCEDURE — 3008F BODY MASS INDEX DOCD: CPT | Mod: CPTII,S$GLB,, | Performed by: INTERNAL MEDICINE

## 2020-08-21 PROCEDURE — 3079F DIAST BP 80-89 MM HG: CPT | Mod: CPTII,S$GLB,, | Performed by: INTERNAL MEDICINE

## 2020-08-21 PROCEDURE — 3008F PR BODY MASS INDEX (BMI) DOCUMENTED: ICD-10-PCS | Mod: CPTII,S$GLB,, | Performed by: INTERNAL MEDICINE

## 2020-08-21 PROCEDURE — 99999 PR PBB SHADOW E&M-EST. PATIENT-LVL III: ICD-10-PCS | Mod: PBBFAC,,, | Performed by: INTERNAL MEDICINE

## 2020-08-21 PROCEDURE — 3074F SYST BP LT 130 MM HG: CPT | Mod: CPTII,S$GLB,, | Performed by: INTERNAL MEDICINE

## 2020-08-21 PROCEDURE — 99999 PR PBB SHADOW E&M-EST. PATIENT-LVL III: CPT | Mod: PBBFAC,,, | Performed by: INTERNAL MEDICINE

## 2020-08-21 PROCEDURE — 3079F PR MOST RECENT DIASTOLIC BLOOD PRESSURE 80-89 MM HG: ICD-10-PCS | Mod: CPTII,S$GLB,, | Performed by: INTERNAL MEDICINE

## 2020-08-21 RX ORDER — TADALAFIL 20 MG/1
20 TABLET ORAL EVERY OTHER DAY
COMMUNITY
Start: 2020-07-13 | End: 2020-09-03

## 2020-08-21 NOTE — PROGRESS NOTES
Subjective:       Patient ID: Nilton Chin is a 60 y.o. male.    Chief Complaint: Annual Exam    HPI   60 y.o. Male here for annual exam.      Vaccines: Influenza (declined); Tetanus (2015)  Eye exam: 2016  Colonoscopy: 2015- NL per pt     Exercise: walks 3x/wk  Diet: regular     Past Medical History:    Allergy                                                       BPH (benign prostatic hypertrophy)                            History of prostatitis                                        Hypertension                                                Surgical History:   Prostatectomy(11/19)  Social History    Marital status:              Spouse name:                       Years of education:                 Number of children: 3              Occupational History  Occupation          Employer            Comment                                              Social History Main Topics    Smoking status: Former Smoker                                                                Packs/day: 0.00      Years: 35.00          Types: Cigarettes       Start date: 6/24/2013    Smokeless status: Never Used                        Comment: 6 cig a day    Alcohol use: Yes                Comment: socially/ not weekly    Drug use: No              Sexual activity: Yes               Partners with: Female      -- Codeine -- Nausea Only   -- Vicodin [Hydrocodone-Acetaminophen]     --  unknown  Review of Systems   Constitutional: Negative for activity change, appetite change, chills, diaphoresis, fatigue, fever and unexpected weight change.   HENT: Negative for nasal congestion, mouth sores, postnasal drip, rhinorrhea, sinus pressure/congestion, sneezing, sore throat, trouble swallowing and voice change.    Eyes: Negative for discharge, itching and visual disturbance.   Respiratory: Negative for cough, chest tightness, shortness of breath and wheezing.    Cardiovascular: Negative for chest pain, palpitations and leg  swelling.   Gastrointestinal: Negative for abdominal pain, blood in stool, constipation, diarrhea, nausea and vomiting.   Endocrine: Negative for cold intolerance and heat intolerance.   Genitourinary: Negative for difficulty urinating, dysuria, flank pain, hematuria and urgency.   Musculoskeletal: Negative for arthralgias, back pain, myalgias and neck pain.   Integumentary:  Negative for rash and wound.   Allergic/Immunologic: Negative for environmental allergies and food allergies.   Neurological: Negative for dizziness, tremors, seizures, syncope, weakness and headaches.   Hematological: Negative for adenopathy. Does not bruise/bleed easily.   Psychiatric/Behavioral: Negative for confusion, sleep disturbance and suicidal ideas. The patient is not nervous/anxious.          Objective:      Physical Exam  Constitutional:       General: He is not in acute distress.     Appearance: He is well-developed. He is not diaphoretic.   HENT:      Head: Normocephalic and atraumatic.      Right Ear: External ear normal.      Left Ear: External ear normal.      Nose: Nose normal.      Mouth/Throat:      Pharynx: No oropharyngeal exudate.   Eyes:      General: No scleral icterus.        Right eye: No discharge.         Left eye: No discharge.      Conjunctiva/sclera: Conjunctivae normal.      Pupils: Pupils are equal, round, and reactive to light.   Neck:      Musculoskeletal: Normal range of motion and neck supple.      Thyroid: No thyromegaly.      Vascular: No JVD.   Cardiovascular:      Rate and Rhythm: Normal rate and regular rhythm.      Heart sounds: Normal heart sounds. No murmur.   Pulmonary:      Effort: Pulmonary effort is normal. No respiratory distress.      Breath sounds: Normal breath sounds. No wheezing or rales.   Abdominal:      General: Bowel sounds are normal. There is no distension.      Palpations: Abdomen is soft.      Tenderness: There is no abdominal tenderness. There is no guarding.   Lymphadenopathy:       Cervical: No cervical adenopathy.   Skin:     General: Skin is warm and dry.      Coloration: Skin is not pale.      Findings: No rash.   Neurological:      Mental Status: He is alert and oriented to person, place, and time.   Psychiatric:         Judgment: Judgment normal.         Assessment:       1. Annual physical exam    2. Essential hypertension    3. Anal fissure    4. Other hyperlipidemia    5. Prostate cancer        Plan:    1. Blood work reviewed with pt       Vaccines: Influenza (declined); Tetanus (2015)       Eye exam: 2016       Colonoscopy: 2015- NL per pt   2. HTN- controlled on Hyzaar 50-12.5 mg daily    3. Anal fissure- stable on topical meds, followed by GI   4. HLD- diet controlled   5. Prostate cancer- s/p prostatectomy(11/19)- followed by Urology

## 2020-09-03 ENCOUNTER — PATIENT OUTREACH (OUTPATIENT)
Dept: ADMINISTRATIVE | Facility: OTHER | Age: 60
End: 2020-09-03

## 2020-09-03 ENCOUNTER — OFFICE VISIT (OUTPATIENT)
Dept: UROLOGY | Facility: CLINIC | Age: 60
End: 2020-09-03
Payer: COMMERCIAL

## 2020-09-03 VITALS
SYSTOLIC BLOOD PRESSURE: 139 MMHG | DIASTOLIC BLOOD PRESSURE: 87 MMHG | HEART RATE: 67 BPM | WEIGHT: 157.88 LBS | HEIGHT: 67 IN | BODY MASS INDEX: 24.78 KG/M2

## 2020-09-03 DIAGNOSIS — C61 PROSTATE CANCER: ICD-10-CM

## 2020-09-03 DIAGNOSIS — I10 ESSENTIAL HYPERTENSION: Chronic | ICD-10-CM

## 2020-09-03 DIAGNOSIS — N52.31 ERECTILE DYSFUNCTION FOLLOWING RADICAL PROSTATECTOMY: Primary | ICD-10-CM

## 2020-09-03 DIAGNOSIS — E78.49 OTHER HYPERLIPIDEMIA: ICD-10-CM

## 2020-09-03 PROCEDURE — 3008F BODY MASS INDEX DOCD: CPT | Mod: CPTII,S$GLB,, | Performed by: UROLOGY

## 2020-09-03 PROCEDURE — 3079F DIAST BP 80-89 MM HG: CPT | Mod: CPTII,S$GLB,, | Performed by: UROLOGY

## 2020-09-03 PROCEDURE — 99999 PR PBB SHADOW E&M-EST. PATIENT-LVL III: CPT | Mod: PBBFAC,,, | Performed by: UROLOGY

## 2020-09-03 PROCEDURE — 3008F PR BODY MASS INDEX (BMI) DOCUMENTED: ICD-10-PCS | Mod: CPTII,S$GLB,, | Performed by: UROLOGY

## 2020-09-03 PROCEDURE — 99214 OFFICE O/P EST MOD 30 MIN: CPT | Mod: S$GLB,,, | Performed by: UROLOGY

## 2020-09-03 PROCEDURE — 3075F PR MOST RECENT SYSTOLIC BLOOD PRESS GE 130-139MM HG: ICD-10-PCS | Mod: CPTII,S$GLB,, | Performed by: UROLOGY

## 2020-09-03 PROCEDURE — 99999 PR PBB SHADOW E&M-EST. PATIENT-LVL III: ICD-10-PCS | Mod: PBBFAC,,, | Performed by: UROLOGY

## 2020-09-03 PROCEDURE — 99214 PR OFFICE/OUTPT VISIT, EST, LEVL IV, 30-39 MIN: ICD-10-PCS | Mod: S$GLB,,, | Performed by: UROLOGY

## 2020-09-03 PROCEDURE — 3079F PR MOST RECENT DIASTOLIC BLOOD PRESSURE 80-89 MM HG: ICD-10-PCS | Mod: CPTII,S$GLB,, | Performed by: UROLOGY

## 2020-09-03 PROCEDURE — 3075F SYST BP GE 130 - 139MM HG: CPT | Mod: CPTII,S$GLB,, | Performed by: UROLOGY

## 2020-09-03 RX ORDER — TADALAFIL 20 MG/1
TABLET ORAL
Qty: 30 TABLET | Refills: 11 | Status: SHIPPED | OUTPATIENT
Start: 2020-09-03 | End: 2022-08-29

## 2020-09-03 NOTE — PROGRESS NOTES
LINKS immunization registry updated  Care Everywhere updated  Health Maintenance updated  Chart reviewed for overdue Proactive [facility] Encounters ([facility]) health maintenance testing (CRS, Breast Ca, Diabetic Eye Exam)   Orders entered:N/A

## 2020-09-03 NOTE — PROGRESS NOTES
Mr. Chin is a 60 y.o. male presenting with ED.    PRESENTING ILLNESS:    Nilton Chin is a 60 y.o. male with prostate cancer s/p RALP on 11/25/19 by Dr. Lawrence.  Since then, he c/o ED.  Prior to surgery, he did not have ED.  He's been taking Cialis with a soft erection.  Has been able to penetrate.     He does not have JUAN.  He is wearing 0 pads/day.  No hematuria.  No dysuria.  Good FOS.    No bone pain or weight loss.    REVIEW OF SYSTEMS:    Nilton Chin denies headache, blurred vision, fever, nausea, vomiting, chills, abdominal pain, chest pain, sore throat, bleeding per rectum, cough, SOB, recent loss of consciousness, recent mental status changes, seizures, dizziness, or upper or lower extremity weakness.    POLO  1. 2  2. 2  3. 2  4. 3  5. 2     PATIENT HISTORY:    Past Medical History:   Diagnosis Date    Allergy     BPH (benign prostatic hypertrophy)     History of prostatitis     Hypertension        Family History   Problem Relation Age of Onset    Cataracts Mother     Hypertension Mother     Cataracts Father     Glaucoma Father     Hypertension Father     Blindness Other     Cancer Paternal Grandfather         lung/prostate    Diabetes Paternal Grandfather     Diabetes Maternal Grandmother     Heart disease Maternal Grandmother     Amblyopia Neg Hx     Strabismus Neg Hx     Retinal detachment Neg Hx     Macular degeneration Neg Hx     Stroke Neg Hx     Melanoma Neg Hx        Past Surgical History:   Procedure Laterality Date    PROSTATE BIOPSY      ROBOT-ASSISTED LAPAROSCOPIC PROSTATECTOMY USING DA EMELY XI N/A 11/25/2019    Procedure: XI ROBOTIC PROSTATECTOMY;  Surgeon: Jose Carlos Lawrence MD;  Location: [facility] OR [address] [place] [address];  Service: Urology;  Laterality: N/A;  3hrs gen with regional        Social History     Socioeconomic History    Marital status:      Spouse name: Not on file    Number of children: 3    Years of education: Not on file    Highest education level:  Not on file   Occupational History    Occupation:    Social Needs    Financial resource strain: Not on file    Food insecurity     Worry: Not on file     Inability: Not on file    Transportation needs     Medical: Not on file     Non-medical: Not on file   Tobacco Use    Smoking status: Former Smoker     Years: 35.00     Types: Cigarettes     Start date: 6/24/2013    Smokeless tobacco: Never Used    Tobacco comment: 6 cig a day   Substance and Sexual Activity    Alcohol use: Yes     Comment: socially/ not weekly    Drug use: No    Sexual activity: Yes     Partners: Female   Lifestyle    Physical activity     Days per week: Not on file     Minutes per session: Not on file    Stress: Not on file   Relationships    Social connections     Talks on phone: Not on file     Gets together: Not on file     Attends Confucianist service: Not on file     Active member of club or organization: Not on file     Attends meetings of clubs or organizations: Not on file     Relationship status: Not on file   Other Topics Concern    Not on file   Social History Narrative    Not on file       Review of patient's allergies indicates:   Allergen Reactions    Codeine Nausea Only    Vicodin [hydrocodone-acetaminophen]      unknown         Current Outpatient Medications:     losartan-hydrochlorothiazide 50-12.5 mg (HYZAAR) 50-12.5 mg per tablet, TAKE 1 TABLET BY MOUTH EVERY DAY, Disp: 90 tablet, Rfl: 3    meloxicam (MOBIC) 15 MG tablet, TK 1 T PO  ONCE D, Disp: , Rfl:     tadalafil (ADCIRCA) 20 mg Tab, Take 20 mg by mouth every other day., Disp: , Rfl:     tadalafil (CIALIS) 20 MG Tab, 1 PO QOD, Disp: 30 tablet, Rfl: 11      PHYSICAL EXAMINATION:    The patient generally appears in good health, is appropriately interactive, and is in no apparent distress.     Eyes: anicteric sclerae, moist conjunctivae; no lid-lag; PERRLA     HENT: Atraumatic; oropharynx clear with moist mucous membranes and no mucosal  ulcerations;normal hard and soft palate. No evidence of lymphadenopathy.    Neck: Trachea midline.  No thyromegaly.    Musculoskeletal: No abnormal gait.    Skin: No lesions.    Mental: Cooperative with normal affect.  Is oriented to time, place, and person.    Neuro: Grossly intact.    Chest: Normal inspiratory effort.   No accessory muscles.  No audible wheezes.  Respirations symmetric on inspiration and expiration.    Heart: Regular rhythm.      Abdomen:  Soft, non-tender. No masses or organomegaly. Bladder is not palpable. No evidence of flank discomfort. No evidence of inguinal hernia.    Genitourinary: The penis is circumcised with no evidence of plaques or induration. The urethral meatus is normal. The testes, epididymides, and cord structures are normal in size and contour bilaterally. The scrotum is normal in size and contour.    Extremities: No clubbing, cyanosis, or edema        LABS:      Lab Results   Component Value Date    PSA 0.16 08/14/2020    PSA 4.4 (H) 08/14/2019    PSA 3.5 08/08/2018    PSADIAG 0.14 07/14/2020    PSADIAG 0.02 01/14/2020    PSADIAG 3.0 11/07/2018        IMPRESSION:    Encounter Diagnoses   Name Primary?    Erectile dysfunction following radical prostatectomy Yes    Prostate cancer     Essential hypertension     Other hyperlipidemia    HTN, controlled  Hyperlipidemia, controlled    PLAN:    1. Discussed options for his ED.  He'd like to continue Cialis for now.  He was given info on [facility] and will think about it.  2. RTC 6 months.    Copy to:

## 2020-11-18 ENCOUNTER — LAB VISIT (OUTPATIENT)
Dept: LAB | Facility: HOSPITAL | Age: 60
End: 2020-11-18
Attending: UROLOGY
Payer: COMMERCIAL

## 2020-11-18 ENCOUNTER — PATIENT OUTREACH (OUTPATIENT)
Dept: ADMINISTRATIVE | Facility: OTHER | Age: 60
End: 2020-11-18

## 2020-11-18 DIAGNOSIS — C61 PROSTATE CANCER: ICD-10-CM

## 2020-11-18 LAB — COMPLEXED PSA SERPL-MCNC: 0.2 NG/ML (ref 0–4)

## 2020-11-18 PROCEDURE — 84153 ASSAY OF PSA TOTAL: CPT

## 2020-11-18 PROCEDURE — 36415 COLL VENOUS BLD VENIPUNCTURE: CPT | Mod: PO

## 2020-11-18 NOTE — PROGRESS NOTES
LINKS immunization registry not responding  Health Maintenance updated  Chart reviewed for overdue Proactive [facility] Encounters ([facility]) health maintenance testing (CRS, Breast Ca, Diabetic Eye Exam)   Orders entered:N/A

## 2020-11-19 ENCOUNTER — OFFICE VISIT (OUTPATIENT)
Dept: UROLOGY | Facility: CLINIC | Age: 60
End: 2020-11-19
Payer: COMMERCIAL

## 2020-11-19 VITALS
SYSTOLIC BLOOD PRESSURE: 144 MMHG | HEIGHT: 67 IN | BODY MASS INDEX: 25.51 KG/M2 | DIASTOLIC BLOOD PRESSURE: 89 MMHG | HEART RATE: 83 BPM | WEIGHT: 162.5 LBS

## 2020-11-19 DIAGNOSIS — C61 PROSTATE CANCER: Primary | ICD-10-CM

## 2020-11-19 PROCEDURE — 99999 PR PBB SHADOW E&M-EST. PATIENT-LVL III: ICD-10-PCS | Mod: PBBFAC,,, | Performed by: UROLOGY

## 2020-11-19 PROCEDURE — 3079F DIAST BP 80-89 MM HG: CPT | Mod: CPTII,S$GLB,, | Performed by: UROLOGY

## 2020-11-19 PROCEDURE — 1126F AMNT PAIN NOTED NONE PRSNT: CPT | Mod: S$GLB,,, | Performed by: UROLOGY

## 2020-11-19 PROCEDURE — 3008F BODY MASS INDEX DOCD: CPT | Mod: CPTII,S$GLB,, | Performed by: UROLOGY

## 2020-11-19 PROCEDURE — 3008F PR BODY MASS INDEX (BMI) DOCUMENTED: ICD-10-PCS | Mod: CPTII,S$GLB,, | Performed by: UROLOGY

## 2020-11-19 PROCEDURE — 99214 PR OFFICE/OUTPT VISIT, EST, LEVL IV, 30-39 MIN: ICD-10-PCS | Mod: S$GLB,,, | Performed by: UROLOGY

## 2020-11-19 PROCEDURE — 3077F PR MOST RECENT SYSTOLIC BLOOD PRESSURE >= 140 MM HG: ICD-10-PCS | Mod: CPTII,S$GLB,, | Performed by: UROLOGY

## 2020-11-19 PROCEDURE — 1126F PR PAIN SEVERITY QUANTIFIED, NO PAIN PRESENT: ICD-10-PCS | Mod: S$GLB,,, | Performed by: UROLOGY

## 2020-11-19 PROCEDURE — 99999 PR PBB SHADOW E&M-EST. PATIENT-LVL III: CPT | Mod: PBBFAC,,, | Performed by: UROLOGY

## 2020-11-19 PROCEDURE — 3079F PR MOST RECENT DIASTOLIC BLOOD PRESSURE 80-89 MM HG: ICD-10-PCS | Mod: CPTII,S$GLB,, | Performed by: UROLOGY

## 2020-11-19 PROCEDURE — 99214 OFFICE O/P EST MOD 30 MIN: CPT | Mod: S$GLB,,, | Performed by: UROLOGY

## 2020-11-19 PROCEDURE — 3077F SYST BP >= 140 MM HG: CPT | Mod: CPTII,S$GLB,, | Performed by: UROLOGY

## 2020-11-19 NOTE — PROGRESS NOTES
Clinic Note  11/19/2020      Subjective:         Chief Complaint:   HPI  Nilton Chin is a 60 y.o. male diagnosed with prostate cancer. Consult from Dr. Lopez.  Here with wife Maisha and daughter.  Negative family history.  [place] [employer]  robotic assisted laparoscopic prostatectomy 11/25/2019- [id number], [place] 3+4.  Continent and pad free. Doing Kegels. Continent and pad free since week 1.  Used no opioids used post op.     Stage-T2  PSA 4.4  Volume- 16 ccs (MRI)  MRI- 3.2 cm RPZ PI-RADS 5 lesion. Negative extra prostatic extension, NVB, Bladder, seminal vesicle, nodes.  Biopsy- 10/4 [place] 8 right middle 2/2 60%; [place] 3+4 left apex 2/2 50%, right base 1/1 60%, right apex 2/2 60%; [place] 6 2/2 50%.  ADELE score- 5  NCCN- high risk      Lab Results   Component Value Date    PSA 0.16 08/14/2020    PSA 4.4 (H) 08/14/2019    PSA 3.5 08/08/2018    PSA 3.3 08/08/2017    PSA 2.3 06/02/2016    PSA 0.80 11/05/2012    PSA 2.26 09/27/2012    PSA 1.1 07/15/2011    PSA 0.95 10/29/2009    PSA 0.74 10/08/2008    PSADIAG 0.20 11/18/2020    PSADIAG 0.14 07/14/2020    PSADIAG 0.02 01/14/2020    PSADIAG 3.0 11/07/2018    PSADIAG 2.8 11/01/2017    PSADIAG 2.3 10/26/2016    PSADIAG 2.2 09/17/2015    PSADIAG 1.9 09/17/2014    PSADIAG 1.2 09/04/2013      Past Medical History:   Diagnosis Date    Allergy     History of prostatitis     Hypertension      Family History   Problem Relation Age of Onset    Cataracts Mother     Hypertension Mother     Cataracts Father     Glaucoma Father     Hypertension Father     Blindness Other     Cancer Paternal Grandfather         lung/prostate    Diabetes Paternal Grandfather     Diabetes Maternal Grandmother     Heart disease Maternal Grandmother     Amblyopia Neg Hx     Strabismus Neg Hx     Retinal detachment Neg Hx     Macular degeneration Neg Hx     Stroke Neg Hx     Melanoma Neg Hx      Social History     Socioeconomic History    Marital status:       Spouse name: Not on file    Number of children: 3    Years of education: Not on file    Highest education level: Not on file   Occupational History    Occupation:    Social Needs    Financial resource strain: Not on file    Food insecurity     Worry: Not on file     Inability: Not on file    Transportation needs     Medical: Not on file     Non-medical: Not on file   Tobacco Use    Smoking status: Former Smoker     Years: 35.00     Types: Cigarettes     Start date: 6/24/2013    Smokeless tobacco: Never Used    Tobacco comment: 6 cig a day   Substance and Sexual Activity    Alcohol use: Yes     Comment: socially/ not weekly    Drug use: No    Sexual activity: Yes     Partners: Female   Lifestyle    Physical activity     Days per week: Not on file     Minutes per session: Not on file    Stress: Not on file   Relationships    Social connections     Talks on phone: Not on file     Gets together: Not on file     Attends Scientology service: Not on file     Active member of club or organization: Not on file     Attends meetings of clubs or organizations: Not on file     Relationship status: Not on file   Other Topics Concern    Not on file   Social History Narrative    Not on file     Past Surgical History:   Procedure Laterality Date    PROSTATE BIOPSY      ROBOT-ASSISTED LAPAROSCOPIC PROSTATECTOMY USING DA EMELY XI N/A 11/25/2019    Procedure: XI ROBOTIC PROSTATECTOMY;  Surgeon: Jose Carlos Lawrence MD;  Location: [facility] OR [address] [place] [address];  Service: Urology;  Laterality: N/A;  3hrs gen with regional      Patient Active Problem List   Diagnosis    Cervical radiculopathy    Hypertension    Cervical neuropathy    Iliotibial band syndrome affecting lower leg    Anal fissure    Other hyperlipidemia    Prostate cancer    Erectile dysfunction following radical prostatectomy     Review of Systems   Constitutional: Negative for appetite change, chills, fatigue, fever and unexpected weight  "change.   HENT: Negative for nosebleeds.    Respiratory: Negative for shortness of breath and wheezing.    Cardiovascular: Negative for chest pain, palpitations and leg swelling.   Gastrointestinal: Negative for abdominal distention, abdominal pain, constipation, diarrhea, nausea and vomiting.   Genitourinary: Negative for dysuria and hematuria.   Musculoskeletal: Negative for arthralgias and back pain.   Skin: Negative for pallor.   Neurological: Negative for dizziness, seizures and syncope.   Hematological: Negative for adenopathy.   Psychiatric/Behavioral: Negative for dysphoric mood.         Objective:      There were no vitals taken for this visit.  Estimated body mass index is 24.72 kg/m² as calculated from the following:    Height as of 9/3/20: 5' 7" (1.702 m).    Weight as of 9/3/20: 71.6 kg (157 lb 13.6 oz).  Physical Exam  Constitutional:       General: He is not in acute distress.     Appearance: He is well-developed. He is not diaphoretic.   HENT:      Head: Atraumatic.   Neck:      Trachea: No tracheal deviation.   Cardiovascular:      Rate and Rhythm: Normal rate.   Pulmonary:      Effort: Pulmonary effort is normal. No respiratory distress.      Breath sounds: No wheezing.   Abdominal:      General: Bowel sounds are normal. There is no distension.      Palpations: Abdomen is soft. There is no mass.      Tenderness: There is no abdominal tenderness. There is no guarding or rebound.   Skin:     General: Skin is warm and dry.   Neurological:      Mental Status: He is alert and oriented to person, place, and time.   Psychiatric:         Behavior: Behavior normal.         Thought Content: Thought content normal.         Judgment: Judgment normal.           Assessment and Plan:           Problem List Items Addressed This Visit     Prostate cancer - Primary          Follow up:   Long discussion about BCR. Discussed EBRT and repeat PSA.  Appointment with Dr Mera Lawrence          "

## 2020-11-30 ENCOUNTER — INITIAL CONSULT (OUTPATIENT)
Dept: RADIATION ONCOLOGY | Facility: CLINIC | Age: 60
End: 2020-11-30
Payer: COMMERCIAL

## 2020-11-30 VITALS
HEART RATE: 74 BPM | SYSTOLIC BLOOD PRESSURE: 136 MMHG | DIASTOLIC BLOOD PRESSURE: 85 MMHG | RESPIRATION RATE: 16 BRPM | HEIGHT: 67 IN | BODY MASS INDEX: 26.02 KG/M2 | WEIGHT: 165.81 LBS

## 2020-11-30 DIAGNOSIS — C61 PROSTATE CANCER: ICD-10-CM

## 2020-11-30 PROCEDURE — 99999 PR PBB SHADOW E&M-EST. PATIENT-LVL III: ICD-10-PCS | Mod: PBBFAC,,, | Performed by: RADIOLOGY

## 2020-11-30 PROCEDURE — 3075F PR MOST RECENT SYSTOLIC BLOOD PRESS GE 130-139MM HG: ICD-10-PCS | Mod: CPTII,S$GLB,, | Performed by: RADIOLOGY

## 2020-11-30 PROCEDURE — 99203 PR OFFICE/OUTPT VISIT, NEW, LEVL III, 30-44 MIN: ICD-10-PCS | Mod: S$GLB,,, | Performed by: RADIOLOGY

## 2020-11-30 PROCEDURE — 3079F PR MOST RECENT DIASTOLIC BLOOD PRESSURE 80-89 MM HG: ICD-10-PCS | Mod: CPTII,S$GLB,, | Performed by: RADIOLOGY

## 2020-11-30 PROCEDURE — 99203 OFFICE O/P NEW LOW 30 MIN: CPT | Mod: S$GLB,,, | Performed by: RADIOLOGY

## 2020-11-30 PROCEDURE — 3079F DIAST BP 80-89 MM HG: CPT | Mod: CPTII,S$GLB,, | Performed by: RADIOLOGY

## 2020-11-30 PROCEDURE — 3075F SYST BP GE 130 - 139MM HG: CPT | Mod: CPTII,S$GLB,, | Performed by: RADIOLOGY

## 2020-11-30 PROCEDURE — 99999 PR PBB SHADOW E&M-EST. PATIENT-LVL III: CPT | Mod: PBBFAC,,, | Performed by: RADIOLOGY

## 2020-11-30 RX ORDER — BICALUTAMIDE 50 MG/1
50 TABLET, FILM COATED ORAL DAILY
Qty: 90 TABLET | Refills: 1 | Status: SHIPPED | OUTPATIENT
Start: 2020-11-30 | End: 2021-12-20

## 2020-11-30 NOTE — LETTER
November 30, 2020      Jose Carlos Lawrence MD  [address]  [facility] [address]           [place] - Radiation Oncology 2nd Floor  [address]  [facility] [address]  Phone: [phone]  Fax: [phone]          Patient: Nilton Chin   MR Number: [id number]   YOB: 1960   Date of Visit: 11/30/2020       Dear Dr. Jose Carlos Lawrence:    Thank you for referring Nilton Chin to me for evaluation. Attached you will find relevant portions of my assessment and plan of care.    If you have questions, please do not hesitate to call me. I look forward to following Nilton Chin along with you.    Sincerely,    Jovanni Tesfaye Jr., MD    Enclosure  CC:  No Recipients    If you would like to receive this communication electronically, please contact [email or web address] or [phone] to request more information on [employer] access.    For providers and/or their staff who would like to refer a patient to [facility], please contact us through our one-stop-shop provider referral line, [facility], at [phone].    If you feel you have received this communication in error or would no longer like to receive these types of communications, please e-mail [email or web address]

## 2020-11-30 NOTE — PROGRESS NOTES
"  HISTORY OF PRESENT ILLNESS:   This patient presents for discussion of salvage irradiation for his prostate cancer.      Mr. Chin has a history of pathologic Stage IIIB (pT3a, pN0, cM0, PSA: 4.4, Grade Group: 2) adenocarcinoma of the prostate.  The patient initially presented in August of 2019 with a slightly elevated screening PSA of 4.4 ng/ml.  BRYAN was negative. Biopsies of the prostate on 10/4/19 revealed [place] 8 (4+4) adenocarcinoma in the Rt. mid gland.  There was [place] 7 (3+4) adenocarcinoma at the Lt. apex, Rt. apex and Rt. base.  There was [place] 6 (3+3) disease at the Lt. mid gland.  MRI of the prostate on 11/12/19 revealed a 16.2 cc prostate there was a 3.2 cm T2 hypointense lesion in the Rt. apex, Pi-RADS 5. There was no extraprostatic extension, neurovascular or seminal vesicle invasion.  The patient underwent RALP with bilateral tori dissection on 11/25/19.  Pathology revealed a 30 g prostate measuring 3.4 x 3.8 x 2.9 cm.  There was "moderate" total volume of Ulises 7 (3+4) adenocarcinoma.  There was extraprostatic extension posteriorly at the apex.  There was no seminal vesicle or urinary bladder neck invasion.  The margins of resection were negative.  Two Rt. and 1 Lt. pelvic nodes were negative for tumor involvement.  Postoperatively, the patient recovered well.  Denies urinary incontinence.  Sexually active and able to achieve erection.  PSA  on 1/14/20 returned at 0.02 ng/ml.  Repeat PSA in July of 2020 returned at 0.14 ng/ml.  His most recent PSA of 11/18/20 was 0.2 ng/ml.  The patient presents for discussion of salvage irradiation.      REVIEW OF SYSTEMS:   Review of Systems   Constitutional: Negative for chills, fever and malaise/fatigue.   Respiratory: Negative for cough and shortness of breath.    Cardiovascular: Negative for chest pain and palpitations.   Gastrointestinal: Negative for abdominal pain, constipation and diarrhea.   Genitourinary: Negative for dysuria, frequency, " hematuria and urgency.         PAST MEDICAL HISTORY:  Past Medical History:   Diagnosis Date    Allergy     History of prostatitis     Hypertension        PAST SURGICAL HISTORY:  Past Surgical History:   Procedure Laterality Date    PROSTATE BIOPSY      ROBOT-ASSISTED LAPAROSCOPIC PROSTATECTOMY USING DA EMELY XI N/A 11/25/2019    Procedure: XI ROBOTIC PROSTATECTOMY;  Surgeon: Jose Carlos Lawrence MD;  Location: [facility] OR [address] [place] [address];  Service: Urology;  Laterality: N/A;  3hrs gen with regional        ALLERGIES:   Review of patient's allergies indicates:   Allergen Reactions    Codeine Nausea Only    Vicodin [hydrocodone-acetaminophen]      unknown       MEDICATIONS:  Current Outpatient Medications   Medication Sig    losartan-hydrochlorothiazide 50-12.5 mg (HYZAAR) 50-12.5 mg per tablet TAKE 1 TABLET BY MOUTH EVERY DAY    meloxicam (MOBIC) 15 MG tablet TK 1 T PO  ONCE D    tadalafiL (CIALIS) 20 MG Tab 1 PO QOD    bicalutamide (CASODEX) 50 MG Tab Take 1 tablet (50 mg total) by mouth once daily.     Current Facility-Administered Medications   Medication    [START ON 12/10/2020] triptorelin pamoate SusR 22.5 mg       SOCIAL HISTORY:  Social History     Socioeconomic History    Marital status:      Spouse name: Not on file    Number of children: 3    Years of education: Not on file    Highest education level: Not on file   Occupational History    Occupation:    Social Needs    Financial resource strain: Not on file    Food insecurity     Worry: Not on file     Inability: Not on file    Transportation needs     Medical: Not on file     Non-medical: Not on file   Tobacco Use    Smoking status: Former Smoker     Years: 35.00     Types: Cigarettes     Start date: 6/24/2013    Smokeless tobacco: Never Used    Tobacco comment: 6 cig a day   Substance and Sexual Activity    Alcohol use: Yes     Comment: socially/ not weekly    Drug use: No    Sexual activity: Yes     Partners: Female    Lifestyle    Physical activity     Days per week: Not on file     Minutes per session: Not on file    Stress: Not on file   Relationships    Social connections     Talks on phone: Not on file     Gets together: Not on file     Attends Judaism service: Not on file     Active member of club or organization: Not on file     Attends meetings of clubs or organizations: Not on file     Relationship status: Not on file   Other Topics Concern    Not on file   Social History Narrative    Not on file       FAMILY HISTORY:  Family History   Problem Relation Age of Onset    Cataracts Mother     Hypertension Mother     Cataracts Father     Glaucoma Father     Hypertension Father     Blindness Other     Cancer Paternal Grandfather         lung/prostate    Diabetes Paternal Grandfather     Diabetes Maternal Grandmother     Heart disease Maternal Grandmother     Amblyopia Neg Hx     Strabismus Neg Hx     Retinal detachment Neg Hx     Macular degeneration Neg Hx     Stroke Neg Hx     Melanoma Neg Hx          PHYSICAL EXAMINATION:  Vitals:    20 0905   BP: 136/85   Pulse: 74   Resp: 16     Physical Exam   Constitutional: He is oriented to person, place, and time and well-developed, well-nourished, and in no distress.   Abdominal: Soft. He exhibits no distension.   Neurological: He is alert and oriented to person, place, and time.   Psychiatric: Mood, affect and judgment normal.       ASSESSMENT/PLAN:  History of pathologic Stage IIIB (pT3a, pN0, cM0, PSA: 4.4, Grade Group: 2) adenocarcinoma of the prostate now with evidence of biochemical failure    ECO    I had a long discussion with the patient and his wife.  I reviewed his pathology and discussed the significance of his increasing PSA .  Explained his PSA behavior indicates the likelihood of recurrent disease.  Discussed the concepts of local vs. distant recurrent disease.  Discussed the rational for consideration of salvage irradiation to the  prostate bed and pelvic nodes using external beam therapy.  Discussed the procedures, risks and benefits of therapy.  We also explained the rational for considering combined therapy with radiotherapy and 6 months of hormonal deprivation therapy. Explained the procedures, risks and benefits of hormonal therapy.  We recommended he consider salvage irradiation along with 6 months of hormonal deprivation therapy.  The patient was agreeable to proceed with therapy.  We will plan to begin bicalutamide today with Trelstar injection planned for next week.  Will plan to begin radiotherapy in late January.      Psychosocial Distress screening score of Distress Score: 0 noted and reviewed. No intervention indicated.      I spent approximately 45 minutes reviewing the available records and evaluating the patient, out of which over 50% of the time was spent face to face with the patient in counseling and coordinating this patient's care.

## 2020-12-10 ENCOUNTER — CLINICAL SUPPORT (OUTPATIENT)
Dept: RADIATION ONCOLOGY | Facility: CLINIC | Age: 60
End: 2020-12-10
Payer: COMMERCIAL

## 2020-12-10 PROCEDURE — 96402 CHEMO HORMON ANTINEOPL SQ/IM: CPT | Mod: S$GLB,,, | Performed by: RADIOLOGY

## 2020-12-10 PROCEDURE — 96402 PR CHEMOTHER HORMON ANTINEOPL SUB-Q/IM: ICD-10-PCS | Mod: S$GLB,,, | Performed by: RADIOLOGY

## 2020-12-14 ENCOUNTER — TELEPHONE (OUTPATIENT)
Dept: RADIATION ONCOLOGY | Facility: CLINIC | Age: 60
End: 2020-12-14

## 2020-12-14 NOTE — TELEPHONE ENCOUNTER
Called to schedule planning session; no answer (someone picked up the phone and held the line open.) Appointment scheduled and placed in the mail. Will continue to reach out to the patient.

## 2021-01-28 ENCOUNTER — HOSPITAL ENCOUNTER (OUTPATIENT)
Dept: RADIATION THERAPY | Facility: HOSPITAL | Age: 61
Discharge: HOME | End: 2021-01-28
Attending: RADIOLOGY
Payer: COMMERCIAL

## 2021-01-28 PROCEDURE — 77290 THER RAD SIMULAJ FIELD CPLX: CPT | Mod: TC | Performed by: RADIOLOGY

## 2021-01-28 PROCEDURE — 77263 PR  RADIATION THERAPY PLAN COMPLEX: ICD-10-PCS | Mod: ,,, | Performed by: RADIOLOGY

## 2021-01-28 PROCEDURE — 77263 THER RADIOLOGY TX PLNG CPLX: CPT | Mod: ,,, | Performed by: RADIOLOGY

## 2021-01-28 PROCEDURE — 77334 RADIATION TREATMENT AID(S): CPT | Mod: TC | Performed by: RADIOLOGY

## 2021-01-28 PROCEDURE — 77334 RADIATION TREATMENT AID(S): CPT | Mod: 26,,, | Performed by: RADIOLOGY

## 2021-01-28 PROCEDURE — 77014 HC CT GUIDANCE RADIATION THERAPY FLDS PLACEMENT: CPT | Mod: TC | Performed by: RADIOLOGY

## 2021-01-28 PROCEDURE — 77334 PR  RADN TREATMENT AID(S) COMPLX: ICD-10-PCS | Mod: 26,,, | Performed by: RADIOLOGY

## 2021-02-01 ENCOUNTER — HOSPITAL ENCOUNTER (OUTPATIENT)
Dept: RADIATION THERAPY | Facility: HOSPITAL | Age: 61
Discharge: HOME | End: 2021-02-01
Attending: RADIOLOGY
Payer: COMMERCIAL

## 2021-02-03 PROCEDURE — 77301 RADIOTHERAPY DOSE PLAN IMRT: CPT | Mod: 26,,, | Performed by: RADIOLOGY

## 2021-02-03 PROCEDURE — 77301 PR  INTEN MOD RADIOTHER PLAN W/DOSE VOL HIST: ICD-10-PCS | Mod: 26,,, | Performed by: RADIOLOGY

## 2021-02-03 PROCEDURE — 77301 RADIOTHERAPY DOSE PLAN IMRT: CPT | Mod: TC | Performed by: RADIOLOGY

## 2021-02-04 PROCEDURE — 77300 PR RADIATION THERAPY,DOSIMETRY PLAN: ICD-10-PCS | Mod: 26,,, | Performed by: RADIOLOGY

## 2021-02-04 PROCEDURE — 77338 DESIGN MLC DEVICE FOR IMRT: CPT | Mod: TC | Performed by: RADIOLOGY

## 2021-02-04 PROCEDURE — 77338 PR  MLC IMRT DESIGN & CONSTRUCTION PER IMRT PLAN: ICD-10-PCS | Mod: 26,,, | Performed by: RADIOLOGY

## 2021-02-04 PROCEDURE — 77014 PR  CT GUIDANCE PLACEMENT RAD THERAPY FIELDS: ICD-10-PCS | Mod: 26,,, | Performed by: RADIOLOGY

## 2021-02-04 PROCEDURE — 77300 RADIATION THERAPY DOSE PLAN: CPT | Mod: 26,,, | Performed by: RADIOLOGY

## 2021-02-04 PROCEDURE — 77300 RADIATION THERAPY DOSE PLAN: CPT | Mod: TC | Performed by: RADIOLOGY

## 2021-02-04 PROCEDURE — 77338 DESIGN MLC DEVICE FOR IMRT: CPT | Mod: 26,,, | Performed by: RADIOLOGY

## 2021-02-04 PROCEDURE — 77385 HC IMRT, SIMPLE: CPT | Performed by: RADIOLOGY

## 2021-02-04 PROCEDURE — 77014 PR  CT GUIDANCE PLACEMENT RAD THERAPY FIELDS: CPT | Mod: 26,,, | Performed by: RADIOLOGY

## 2021-02-04 PROCEDURE — 77014 HC CT GUIDANCE RADIATION THERAPY FLDS PLACEMENT: CPT | Mod: TC | Performed by: RADIOLOGY

## 2021-02-05 PROCEDURE — 77014 PR  CT GUIDANCE PLACEMENT RAD THERAPY FIELDS: CPT | Mod: 26,,, | Performed by: RADIOLOGY

## 2021-02-05 PROCEDURE — 77014 HC CT GUIDANCE RADIATION THERAPY FLDS PLACEMENT: CPT | Mod: TC | Performed by: RADIOLOGY

## 2021-02-05 PROCEDURE — 77014 PR  CT GUIDANCE PLACEMENT RAD THERAPY FIELDS: ICD-10-PCS | Mod: 26,,, | Performed by: RADIOLOGY

## 2021-02-05 PROCEDURE — 77385 HC IMRT, SIMPLE: CPT | Performed by: RADIOLOGY

## 2021-02-08 PROCEDURE — 77014 HC CT GUIDANCE RADIATION THERAPY FLDS PLACEMENT: CPT | Mod: TC | Performed by: RADIOLOGY

## 2021-02-08 PROCEDURE — 77014 PR  CT GUIDANCE PLACEMENT RAD THERAPY FIELDS: CPT | Mod: 26,,, | Performed by: RADIOLOGY

## 2021-02-08 PROCEDURE — 77385 HC IMRT, SIMPLE: CPT | Performed by: RADIOLOGY

## 2021-02-08 PROCEDURE — 77014 PR  CT GUIDANCE PLACEMENT RAD THERAPY FIELDS: ICD-10-PCS | Mod: 26,,, | Performed by: RADIOLOGY

## 2021-02-09 PROCEDURE — 77014 PR  CT GUIDANCE PLACEMENT RAD THERAPY FIELDS: ICD-10-PCS | Mod: 26,,, | Performed by: RADIOLOGY

## 2021-02-09 PROCEDURE — 77014 PR  CT GUIDANCE PLACEMENT RAD THERAPY FIELDS: CPT | Mod: 26,,, | Performed by: RADIOLOGY

## 2021-02-09 PROCEDURE — 77385 HC IMRT, SIMPLE: CPT | Performed by: RADIOLOGY

## 2021-02-09 PROCEDURE — 77014 HC CT GUIDANCE RADIATION THERAPY FLDS PLACEMENT: CPT | Mod: TC | Performed by: RADIOLOGY

## 2021-02-10 PROCEDURE — 77336 RADIATION PHYSICS CONSULT: CPT | Performed by: RADIOLOGY

## 2021-02-10 PROCEDURE — 77014 PR  CT GUIDANCE PLACEMENT RAD THERAPY FIELDS: CPT | Mod: 26,,, | Performed by: RADIOLOGY

## 2021-02-10 PROCEDURE — 77014 HC CT GUIDANCE RADIATION THERAPY FLDS PLACEMENT: CPT | Mod: TC | Performed by: RADIOLOGY

## 2021-02-10 PROCEDURE — 77014 PR  CT GUIDANCE PLACEMENT RAD THERAPY FIELDS: ICD-10-PCS | Mod: 26,,, | Performed by: RADIOLOGY

## 2021-02-10 PROCEDURE — 77385 HC IMRT, SIMPLE: CPT | Performed by: RADIOLOGY

## 2021-02-11 PROCEDURE — 77014 PR  CT GUIDANCE PLACEMENT RAD THERAPY FIELDS: ICD-10-PCS | Mod: 26,,, | Performed by: RADIOLOGY

## 2021-02-11 PROCEDURE — 77385 HC IMRT, SIMPLE: CPT | Performed by: RADIOLOGY

## 2021-02-11 PROCEDURE — 77014 HC CT GUIDANCE RADIATION THERAPY FLDS PLACEMENT: CPT | Mod: TC | Performed by: RADIOLOGY

## 2021-02-11 PROCEDURE — 77014 PR  CT GUIDANCE PLACEMENT RAD THERAPY FIELDS: CPT | Mod: 26,,, | Performed by: RADIOLOGY

## 2021-02-15 PROCEDURE — 77385 HC IMRT, SIMPLE: CPT | Performed by: RADIOLOGY

## 2021-02-15 PROCEDURE — 77417 THER RADIOLOGY PORT IMAGE(S): CPT | Performed by: RADIOLOGY

## 2021-02-15 PROCEDURE — 77387 PR GUIDANCE FOR RADIATION TREATMENT DELIVERY: ICD-10-PCS | Mod: 26,,, | Performed by: RADIOLOGY

## 2021-02-15 PROCEDURE — 77387 GUIDANCE FOR RADJ TX DLVR: CPT | Mod: 26,,, | Performed by: RADIOLOGY

## 2021-02-17 DIAGNOSIS — I10 ESSENTIAL HYPERTENSION: ICD-10-CM

## 2021-02-17 PROCEDURE — 77385 HC IMRT, SIMPLE: CPT | Performed by: RADIOLOGY

## 2021-02-17 PROCEDURE — 77387 GUIDANCE FOR RADJ TX DLVR: CPT | Mod: 26,,, | Performed by: RADIOLOGY

## 2021-02-17 PROCEDURE — 77387 PR GUIDANCE FOR RADIATION TREATMENT DELIVERY: ICD-10-PCS | Mod: 26,,, | Performed by: RADIOLOGY

## 2021-02-18 ENCOUNTER — DOCUMENTATION ONLY (OUTPATIENT)
Dept: RADIATION ONCOLOGY | Facility: CLINIC | Age: 61
End: 2021-02-18

## 2021-02-18 PROCEDURE — 77014 PR  CT GUIDANCE PLACEMENT RAD THERAPY FIELDS: CPT | Mod: 26,,, | Performed by: RADIOLOGY

## 2021-02-18 PROCEDURE — 77014 HC CT GUIDANCE RADIATION THERAPY FLDS PLACEMENT: CPT | Mod: TC | Performed by: RADIOLOGY

## 2021-02-18 PROCEDURE — 77014 PR  CT GUIDANCE PLACEMENT RAD THERAPY FIELDS: ICD-10-PCS | Mod: 26,,, | Performed by: RADIOLOGY

## 2021-02-18 PROCEDURE — 77385 HC IMRT, SIMPLE: CPT | Performed by: RADIOLOGY

## 2021-02-19 PROCEDURE — 77387 GUIDANCE FOR RADJ TX DLVR: CPT | Mod: 26,,, | Performed by: RADIOLOGY

## 2021-02-19 PROCEDURE — 77385 HC IMRT, SIMPLE: CPT | Performed by: RADIOLOGY

## 2021-02-19 PROCEDURE — 77387 PR GUIDANCE FOR RADIATION TREATMENT DELIVERY: ICD-10-PCS | Mod: 26,,, | Performed by: RADIOLOGY

## 2021-02-19 RX ORDER — LOSARTAN POTASSIUM AND HYDROCHLOROTHIAZIDE 12.5; 5 MG/1; MG/1
1 TABLET ORAL DAILY
Qty: 90 TABLET | Refills: 1 | Status: SHIPPED | OUTPATIENT
Start: 2021-02-19 | End: 2021-08-23 | Stop reason: SDUPTHER

## 2021-02-22 PROCEDURE — 77014 HC CT GUIDANCE RADIATION THERAPY FLDS PLACEMENT: CPT | Mod: TC | Performed by: RADIOLOGY

## 2021-02-22 PROCEDURE — 77014 PR  CT GUIDANCE PLACEMENT RAD THERAPY FIELDS: ICD-10-PCS | Mod: 26,,, | Performed by: RADIOLOGY

## 2021-02-22 PROCEDURE — 77385 HC IMRT, SIMPLE: CPT | Performed by: RADIOLOGY

## 2021-02-22 PROCEDURE — 77014 PR  CT GUIDANCE PLACEMENT RAD THERAPY FIELDS: CPT | Mod: 26,,, | Performed by: RADIOLOGY

## 2021-02-22 PROCEDURE — 77336 RADIATION PHYSICS CONSULT: CPT | Performed by: RADIOLOGY

## 2021-02-23 PROCEDURE — 77387 GUIDANCE FOR RADJ TX DLVR: CPT | Mod: 26,,, | Performed by: RADIOLOGY

## 2021-02-23 PROCEDURE — 77385 HC IMRT, SIMPLE: CPT | Performed by: RADIOLOGY

## 2021-02-23 PROCEDURE — 77387 PR GUIDANCE FOR RADIATION TREATMENT DELIVERY: ICD-10-PCS | Mod: 26,,, | Performed by: RADIOLOGY

## 2021-02-23 PROCEDURE — 77417 THER RADIOLOGY PORT IMAGE(S): CPT | Performed by: RADIOLOGY

## 2021-02-24 ENCOUNTER — PATIENT OUTREACH (OUTPATIENT)
Dept: ADMINISTRATIVE | Facility: OTHER | Age: 61
End: 2021-02-24

## 2021-02-24 PROCEDURE — 77014 HC CT GUIDANCE RADIATION THERAPY FLDS PLACEMENT: CPT | Mod: TC | Performed by: RADIOLOGY

## 2021-02-24 PROCEDURE — 77014 PR  CT GUIDANCE PLACEMENT RAD THERAPY FIELDS: ICD-10-PCS | Mod: 26,,, | Performed by: RADIOLOGY

## 2021-02-24 PROCEDURE — 77385 HC IMRT, SIMPLE: CPT | Performed by: RADIOLOGY

## 2021-02-24 PROCEDURE — 77014 PR  CT GUIDANCE PLACEMENT RAD THERAPY FIELDS: CPT | Mod: 26,,, | Performed by: RADIOLOGY

## 2021-02-25 ENCOUNTER — OFFICE VISIT (OUTPATIENT)
Dept: OPTOMETRY | Facility: CLINIC | Age: 61
End: 2021-02-25
Payer: COMMERCIAL

## 2021-02-25 ENCOUNTER — DOCUMENTATION ONLY (OUTPATIENT)
Dept: RADIATION ONCOLOGY | Facility: CLINIC | Age: 61
End: 2021-02-25

## 2021-02-25 DIAGNOSIS — H52.4 PRESBYOPIA: ICD-10-CM

## 2021-02-25 DIAGNOSIS — H04.123 DRY EYES, BILATERAL: Primary | ICD-10-CM

## 2021-02-25 DIAGNOSIS — H25.13 NUCLEAR SCLEROSIS, BILATERAL: ICD-10-CM

## 2021-02-25 DIAGNOSIS — Z83.511 FAMILY HISTORY OF GLAUCOMA: ICD-10-CM

## 2021-02-25 PROCEDURE — 92015 PR REFRACTION: ICD-10-PCS | Mod: S$GLB,,, | Performed by: OPTOMETRIST

## 2021-02-25 PROCEDURE — 77387 PR GUIDANCE FOR RADIATION TREATMENT DELIVERY: ICD-10-PCS | Mod: 26,,, | Performed by: RADIOLOGY

## 2021-02-25 PROCEDURE — 77385 HC IMRT, SIMPLE: CPT | Performed by: RADIOLOGY

## 2021-02-25 PROCEDURE — 92014 PR EYE EXAM, EST PATIENT,COMPREHESV: ICD-10-PCS | Mod: S$GLB,,, | Performed by: OPTOMETRIST

## 2021-02-25 PROCEDURE — 92014 COMPRE OPH EXAM EST PT 1/>: CPT | Mod: S$GLB,,, | Performed by: OPTOMETRIST

## 2021-02-25 PROCEDURE — 99999 PR PBB SHADOW E&M-EST. PATIENT-LVL II: CPT | Mod: PBBFAC,,, | Performed by: OPTOMETRIST

## 2021-02-25 PROCEDURE — 92015 DETERMINE REFRACTIVE STATE: CPT | Mod: S$GLB,,, | Performed by: OPTOMETRIST

## 2021-02-25 PROCEDURE — 1126F AMNT PAIN NOTED NONE PRSNT: CPT | Mod: S$GLB,,, | Performed by: OPTOMETRIST

## 2021-02-25 PROCEDURE — 1126F PR PAIN SEVERITY QUANTIFIED, NO PAIN PRESENT: ICD-10-PCS | Mod: S$GLB,,, | Performed by: OPTOMETRIST

## 2021-02-25 PROCEDURE — 99999 PR PBB SHADOW E&M-EST. PATIENT-LVL II: ICD-10-PCS | Mod: PBBFAC,,, | Performed by: OPTOMETRIST

## 2021-02-25 PROCEDURE — 77387 GUIDANCE FOR RADJ TX DLVR: CPT | Mod: 26,,, | Performed by: RADIOLOGY

## 2021-02-26 PROCEDURE — 77014 HC CT GUIDANCE RADIATION THERAPY FLDS PLACEMENT: CPT | Mod: TC | Performed by: RADIOLOGY

## 2021-02-26 PROCEDURE — 77014 PR  CT GUIDANCE PLACEMENT RAD THERAPY FIELDS: ICD-10-PCS | Mod: 26,,, | Performed by: RADIOLOGY

## 2021-02-26 PROCEDURE — 77014 PR  CT GUIDANCE PLACEMENT RAD THERAPY FIELDS: CPT | Mod: 26,,, | Performed by: RADIOLOGY

## 2021-02-26 PROCEDURE — 77385 HC IMRT, SIMPLE: CPT | Performed by: RADIOLOGY

## 2021-03-01 ENCOUNTER — HOSPITAL ENCOUNTER (OUTPATIENT)
Dept: RADIATION THERAPY | Facility: HOSPITAL | Age: 61
Discharge: HOME | End: 2021-03-01
Attending: RADIOLOGY
Payer: COMMERCIAL

## 2021-03-01 ENCOUNTER — PATIENT MESSAGE (OUTPATIENT)
Dept: UROLOGY | Facility: CLINIC | Age: 61
End: 2021-03-01

## 2021-03-01 PROCEDURE — 77387 PR GUIDANCE FOR RADIATION TREATMENT DELIVERY: ICD-10-PCS | Mod: 26,,, | Performed by: RADIOLOGY

## 2021-03-01 PROCEDURE — 77387 GUIDANCE FOR RADJ TX DLVR: CPT | Mod: 26,,, | Performed by: RADIOLOGY

## 2021-03-01 PROCEDURE — 77385 HC IMRT, SIMPLE: CPT | Performed by: RADIOLOGY

## 2021-03-01 PROCEDURE — 77336 RADIATION PHYSICS CONSULT: CPT | Performed by: RADIOLOGY

## 2021-03-02 PROCEDURE — 77385 HC IMRT, SIMPLE: CPT | Performed by: RADIOLOGY

## 2021-03-02 PROCEDURE — 77014 PR  CT GUIDANCE PLACEMENT RAD THERAPY FIELDS: CPT | Mod: 26,,, | Performed by: RADIOLOGY

## 2021-03-02 PROCEDURE — 77014 PR  CT GUIDANCE PLACEMENT RAD THERAPY FIELDS: ICD-10-PCS | Mod: 26,,, | Performed by: RADIOLOGY

## 2021-03-02 PROCEDURE — 77014 HC CT GUIDANCE RADIATION THERAPY FLDS PLACEMENT: CPT | Mod: TC | Performed by: RADIOLOGY

## 2021-03-03 ENCOUNTER — DOCUMENTATION ONLY (OUTPATIENT)
Dept: RADIATION ONCOLOGY | Facility: CLINIC | Age: 61
End: 2021-03-03

## 2021-03-03 PROCEDURE — 77385 HC IMRT, SIMPLE: CPT | Performed by: RADIOLOGY

## 2021-03-03 PROCEDURE — 77417 THER RADIOLOGY PORT IMAGE(S): CPT | Performed by: RADIOLOGY

## 2021-03-03 PROCEDURE — 77387 GUIDANCE FOR RADJ TX DLVR: CPT | Mod: 26,,, | Performed by: RADIOLOGY

## 2021-03-03 PROCEDURE — 77387 PR GUIDANCE FOR RADIATION TREATMENT DELIVERY: ICD-10-PCS | Mod: 26,,, | Performed by: RADIOLOGY

## 2021-03-04 PROCEDURE — 77014 PR  CT GUIDANCE PLACEMENT RAD THERAPY FIELDS: ICD-10-PCS | Mod: 26,,, | Performed by: RADIOLOGY

## 2021-03-04 PROCEDURE — 77014 HC CT GUIDANCE RADIATION THERAPY FLDS PLACEMENT: CPT | Mod: TC | Performed by: RADIOLOGY

## 2021-03-04 PROCEDURE — 77385 HC IMRT, SIMPLE: CPT | Performed by: RADIOLOGY

## 2021-03-04 PROCEDURE — 77014 PR  CT GUIDANCE PLACEMENT RAD THERAPY FIELDS: CPT | Mod: 26,,, | Performed by: RADIOLOGY

## 2021-03-05 PROCEDURE — 77385 HC IMRT, SIMPLE: CPT | Performed by: RADIOLOGY

## 2021-03-05 PROCEDURE — 77387 GUIDANCE FOR RADJ TX DLVR: CPT | Mod: 26,,, | Performed by: RADIOLOGY

## 2021-03-05 PROCEDURE — 77387 PR GUIDANCE FOR RADIATION TREATMENT DELIVERY: ICD-10-PCS | Mod: 26,,, | Performed by: RADIOLOGY

## 2021-03-08 PROCEDURE — 77014 HC CT GUIDANCE RADIATION THERAPY FLDS PLACEMENT: CPT | Mod: TC | Performed by: RADIOLOGY

## 2021-03-08 PROCEDURE — 77014 PR  CT GUIDANCE PLACEMENT RAD THERAPY FIELDS: ICD-10-PCS | Mod: 26,,, | Performed by: RADIOLOGY

## 2021-03-08 PROCEDURE — 77385 HC IMRT, SIMPLE: CPT | Performed by: RADIOLOGY

## 2021-03-08 PROCEDURE — 77336 RADIATION PHYSICS CONSULT: CPT | Performed by: RADIOLOGY

## 2021-03-08 PROCEDURE — 77014 PR  CT GUIDANCE PLACEMENT RAD THERAPY FIELDS: CPT | Mod: 26,,, | Performed by: RADIOLOGY

## 2021-03-09 PROCEDURE — 77387 GUIDANCE FOR RADJ TX DLVR: CPT | Mod: 26,,, | Performed by: RADIOLOGY

## 2021-03-09 PROCEDURE — 77387 PR GUIDANCE FOR RADIATION TREATMENT DELIVERY: ICD-10-PCS | Mod: 26,,, | Performed by: RADIOLOGY

## 2021-03-09 PROCEDURE — 77385 HC IMRT, SIMPLE: CPT | Performed by: RADIOLOGY

## 2021-03-10 PROCEDURE — 77014 PR  CT GUIDANCE PLACEMENT RAD THERAPY FIELDS: ICD-10-PCS | Mod: 26,,, | Performed by: RADIOLOGY

## 2021-03-10 PROCEDURE — 77300 PR RADIATION THERAPY,DOSIMETRY PLAN: ICD-10-PCS | Mod: 26,,, | Performed by: RADIOLOGY

## 2021-03-10 PROCEDURE — 77300 RADIATION THERAPY DOSE PLAN: CPT | Mod: 26,,, | Performed by: RADIOLOGY

## 2021-03-10 PROCEDURE — 77338 DESIGN MLC DEVICE FOR IMRT: CPT | Mod: 26,,, | Performed by: RADIOLOGY

## 2021-03-10 PROCEDURE — 77338 PR  MLC IMRT DESIGN & CONSTRUCTION PER IMRT PLAN: ICD-10-PCS | Mod: 26,,, | Performed by: RADIOLOGY

## 2021-03-10 PROCEDURE — 77014 HC CT GUIDANCE RADIATION THERAPY FLDS PLACEMENT: CPT | Mod: TC | Performed by: RADIOLOGY

## 2021-03-10 PROCEDURE — 77300 RADIATION THERAPY DOSE PLAN: CPT | Mod: TC | Performed by: RADIOLOGY

## 2021-03-10 PROCEDURE — 77385 HC IMRT, SIMPLE: CPT | Performed by: RADIOLOGY

## 2021-03-10 PROCEDURE — 77014 PR  CT GUIDANCE PLACEMENT RAD THERAPY FIELDS: CPT | Mod: 26,,, | Performed by: RADIOLOGY

## 2021-03-10 PROCEDURE — 77338 DESIGN MLC DEVICE FOR IMRT: CPT | Mod: TC,59 | Performed by: RADIOLOGY

## 2021-03-11 ENCOUNTER — DOCUMENTATION ONLY (OUTPATIENT)
Dept: RADIATION ONCOLOGY | Facility: CLINIC | Age: 61
End: 2021-03-11

## 2021-03-11 PROCEDURE — 77385 HC IMRT, SIMPLE: CPT | Performed by: RADIOLOGY

## 2021-03-11 PROCEDURE — 77387 PR GUIDANCE FOR RADIATION TREATMENT DELIVERY: ICD-10-PCS | Mod: 26,,, | Performed by: RADIOLOGY

## 2021-03-11 PROCEDURE — 77387 GUIDANCE FOR RADJ TX DLVR: CPT | Mod: 26,,, | Performed by: RADIOLOGY

## 2021-03-12 ENCOUNTER — IMMUNIZATION (OUTPATIENT)
Dept: PRIMARY CARE CLINIC | Facility: CLINIC | Age: 61
End: 2021-03-12
Payer: COMMERCIAL

## 2021-03-12 DIAGNOSIS — Z23 NEED FOR VACCINATION: Primary | ICD-10-CM

## 2021-03-12 PROCEDURE — 77385 HC IMRT, SIMPLE: CPT | Performed by: RADIOLOGY

## 2021-03-12 PROCEDURE — 91300 PR SARS-COV- 2 COVID-19 VACCINE, NO PRSV, 30MCG/0.3ML, IM: ICD-10-PCS | Mod: S$GLB,,, | Performed by: INTERNAL MEDICINE

## 2021-03-12 PROCEDURE — 77014 PR  CT GUIDANCE PLACEMENT RAD THERAPY FIELDS: CPT | Mod: 26,,, | Performed by: RADIOLOGY

## 2021-03-12 PROCEDURE — 91300 PR SARS-COV- 2 COVID-19 VACCINE, NO PRSV, 30MCG/0.3ML, IM: CPT | Mod: S$GLB,,, | Performed by: INTERNAL MEDICINE

## 2021-03-12 PROCEDURE — 0001A PR IMMUNIZ ADMIN, SARS-COV-2 COVID-19 VACC, 30MCG/0.3ML, 1ST DOSE: ICD-10-PCS | Mod: CV19,S$GLB,, | Performed by: INTERNAL MEDICINE

## 2021-03-12 PROCEDURE — 77014 PR  CT GUIDANCE PLACEMENT RAD THERAPY FIELDS: ICD-10-PCS | Mod: 26,,, | Performed by: RADIOLOGY

## 2021-03-12 PROCEDURE — 77014 HC CT GUIDANCE RADIATION THERAPY FLDS PLACEMENT: CPT | Mod: TC | Performed by: RADIOLOGY

## 2021-03-12 PROCEDURE — 0001A PR IMMUNIZ ADMIN, SARS-COV-2 COVID-19 VACC, 30MCG/0.3ML, 1ST DOSE: CPT | Mod: CV19,S$GLB,, | Performed by: INTERNAL MEDICINE

## 2021-03-12 RX ADMIN — Medication 0.3 ML: at 12:03

## 2021-03-15 PROCEDURE — 77385 HC IMRT, SIMPLE: CPT | Performed by: RADIOLOGY

## 2021-03-15 PROCEDURE — 77387 GUIDANCE FOR RADJ TX DLVR: CPT | Mod: 26,,, | Performed by: RADIOLOGY

## 2021-03-15 PROCEDURE — 77387 PR GUIDANCE FOR RADIATION TREATMENT DELIVERY: ICD-10-PCS | Mod: 26,,, | Performed by: RADIOLOGY

## 2021-03-15 PROCEDURE — 77336 RADIATION PHYSICS CONSULT: CPT | Performed by: RADIOLOGY

## 2021-03-16 PROCEDURE — 77014 PR  CT GUIDANCE PLACEMENT RAD THERAPY FIELDS: CPT | Mod: 26,,, | Performed by: RADIOLOGY

## 2021-03-16 PROCEDURE — 77385 HC IMRT, SIMPLE: CPT | Performed by: RADIOLOGY

## 2021-03-16 PROCEDURE — 77014 HC CT GUIDANCE RADIATION THERAPY FLDS PLACEMENT: CPT | Mod: TC | Performed by: RADIOLOGY

## 2021-03-16 PROCEDURE — 77014 PR  CT GUIDANCE PLACEMENT RAD THERAPY FIELDS: ICD-10-PCS | Mod: 26,,, | Performed by: RADIOLOGY

## 2021-03-18 ENCOUNTER — DOCUMENTATION ONLY (OUTPATIENT)
Dept: RADIATION ONCOLOGY | Facility: CLINIC | Age: 61
End: 2021-03-18

## 2021-03-18 PROCEDURE — 77385 HC IMRT, SIMPLE: CPT | Performed by: RADIOLOGY

## 2021-03-18 PROCEDURE — 77387 PR GUIDANCE FOR RADIATION TREATMENT DELIVERY: ICD-10-PCS | Mod: 26,,, | Performed by: RADIOLOGY

## 2021-03-18 PROCEDURE — 77417 THER RADIOLOGY PORT IMAGE(S): CPT | Performed by: RADIOLOGY

## 2021-03-18 PROCEDURE — 77387 GUIDANCE FOR RADJ TX DLVR: CPT | Mod: 26,,, | Performed by: RADIOLOGY

## 2021-03-19 PROCEDURE — 77014 HC CT GUIDANCE RADIATION THERAPY FLDS PLACEMENT: CPT | Mod: TC | Performed by: RADIOLOGY

## 2021-03-19 PROCEDURE — 77014 PR  CT GUIDANCE PLACEMENT RAD THERAPY FIELDS: ICD-10-PCS | Mod: 26,,, | Performed by: RADIOLOGY

## 2021-03-19 PROCEDURE — 77385 HC IMRT, SIMPLE: CPT | Performed by: RADIOLOGY

## 2021-03-19 PROCEDURE — 77014 PR  CT GUIDANCE PLACEMENT RAD THERAPY FIELDS: CPT | Mod: 26,,, | Performed by: RADIOLOGY

## 2021-03-22 PROCEDURE — 77387 PR GUIDANCE FOR RADIATION TREATMENT DELIVERY: ICD-10-PCS | Mod: 26,,, | Performed by: RADIOLOGY

## 2021-03-22 PROCEDURE — 77385 HC IMRT, SIMPLE: CPT | Performed by: RADIOLOGY

## 2021-03-22 PROCEDURE — 77387 GUIDANCE FOR RADJ TX DLVR: CPT | Mod: 26,,, | Performed by: RADIOLOGY

## 2021-03-23 PROCEDURE — 77385 HC IMRT, SIMPLE: CPT | Performed by: RADIOLOGY

## 2021-03-23 PROCEDURE — 77014 PR  CT GUIDANCE PLACEMENT RAD THERAPY FIELDS: ICD-10-PCS | Mod: 26,,, | Performed by: RADIOLOGY

## 2021-03-23 PROCEDURE — 77014 PR  CT GUIDANCE PLACEMENT RAD THERAPY FIELDS: CPT | Mod: 26,,, | Performed by: RADIOLOGY

## 2021-03-23 PROCEDURE — 77014 HC CT GUIDANCE RADIATION THERAPY FLDS PLACEMENT: CPT | Mod: TC | Performed by: RADIOLOGY

## 2021-03-24 PROCEDURE — 77385 HC IMRT, SIMPLE: CPT | Performed by: RADIOLOGY

## 2021-03-24 PROCEDURE — 77387 PR GUIDANCE FOR RADIATION TREATMENT DELIVERY: ICD-10-PCS | Mod: 26,,, | Performed by: RADIOLOGY

## 2021-03-24 PROCEDURE — 77336 RADIATION PHYSICS CONSULT: CPT | Performed by: RADIOLOGY

## 2021-03-24 PROCEDURE — 77387 GUIDANCE FOR RADJ TX DLVR: CPT | Mod: 26,,, | Performed by: RADIOLOGY

## 2021-03-25 ENCOUNTER — DOCUMENTATION ONLY (OUTPATIENT)
Dept: RADIATION ONCOLOGY | Facility: CLINIC | Age: 61
End: 2021-03-25

## 2021-03-25 DIAGNOSIS — C61 PROSTATE CANCER: Primary | ICD-10-CM

## 2021-03-25 PROCEDURE — 77014 HC CT GUIDANCE RADIATION THERAPY FLDS PLACEMENT: CPT | Mod: TC | Performed by: RADIOLOGY

## 2021-03-25 PROCEDURE — 77014 PR  CT GUIDANCE PLACEMENT RAD THERAPY FIELDS: ICD-10-PCS | Mod: 26,,, | Performed by: RADIOLOGY

## 2021-03-25 PROCEDURE — 77385 HC IMRT, SIMPLE: CPT | Performed by: RADIOLOGY

## 2021-03-25 PROCEDURE — 77014 PR  CT GUIDANCE PLACEMENT RAD THERAPY FIELDS: CPT | Mod: 26,,, | Performed by: RADIOLOGY

## 2021-03-26 ENCOUNTER — LAB VISIT (OUTPATIENT)
Dept: LAB | Facility: HOSPITAL | Age: 61
End: 2021-03-26
Attending: RADIOLOGY
Payer: COMMERCIAL

## 2021-03-26 DIAGNOSIS — C61 PROSTATE CANCER: ICD-10-CM

## 2021-03-26 LAB
ALBUMIN SERPL BCP-MCNC: 4.2 G/DL (ref 3.5–5.2)
ALP SERPL-CCNC: 71 U/L (ref 55–135)
ALT SERPL W/O P-5'-P-CCNC: 23 U/L (ref 10–44)
AST SERPL-CCNC: 21 U/L (ref 10–40)
BILIRUB DIRECT SERPL-MCNC: 0.4 MG/DL (ref 0.1–0.3)
BILIRUB SERPL-MCNC: 1.3 MG/DL (ref 0.1–1)
PROT SERPL-MCNC: 7.5 G/DL (ref 6–8.4)

## 2021-03-26 PROCEDURE — 77387 PR GUIDANCE FOR RADIATION TREATMENT DELIVERY: ICD-10-PCS | Mod: 26,,, | Performed by: RADIOLOGY

## 2021-03-26 PROCEDURE — 77387 GUIDANCE FOR RADJ TX DLVR: CPT | Mod: 26,,, | Performed by: RADIOLOGY

## 2021-03-26 PROCEDURE — 77385 HC IMRT, SIMPLE: CPT | Performed by: RADIOLOGY

## 2021-03-26 PROCEDURE — 80076 HEPATIC FUNCTION PANEL: CPT | Performed by: RADIOLOGY

## 2021-03-26 PROCEDURE — 77417 THER RADIOLOGY PORT IMAGE(S): CPT | Performed by: RADIOLOGY

## 2021-03-26 PROCEDURE — 36415 COLL VENOUS BLD VENIPUNCTURE: CPT | Performed by: RADIOLOGY

## 2021-03-29 PROCEDURE — 77386 HC IMRT, COMPLEX: CPT | Performed by: RADIOLOGY

## 2021-03-29 PROCEDURE — 77385 HC IMRT, SIMPLE: CPT | Performed by: RADIOLOGY

## 2021-03-29 PROCEDURE — 77014 HC CT GUIDANCE RADIATION THERAPY FLDS PLACEMENT: CPT | Mod: TC | Performed by: RADIOLOGY

## 2021-03-29 PROCEDURE — 77014 PR  CT GUIDANCE PLACEMENT RAD THERAPY FIELDS: CPT | Mod: 26,,, | Performed by: RADIOLOGY

## 2021-03-29 PROCEDURE — 77014 PR  CT GUIDANCE PLACEMENT RAD THERAPY FIELDS: ICD-10-PCS | Mod: 26,,, | Performed by: RADIOLOGY

## 2021-03-30 PROCEDURE — 77336 RADIATION PHYSICS CONSULT: CPT | Performed by: RADIOLOGY

## 2021-03-30 PROCEDURE — 77387 GUIDANCE FOR RADJ TX DLVR: CPT | Mod: 26,,, | Performed by: RADIOLOGY

## 2021-03-30 PROCEDURE — 77385 HC IMRT, SIMPLE: CPT | Performed by: RADIOLOGY

## 2021-03-30 PROCEDURE — 77387 PR GUIDANCE FOR RADIATION TREATMENT DELIVERY: ICD-10-PCS | Mod: 26,,, | Performed by: RADIOLOGY

## 2021-03-31 PROCEDURE — 77385 HC IMRT, SIMPLE: CPT | Performed by: RADIOLOGY

## 2021-03-31 PROCEDURE — 77014 PR  CT GUIDANCE PLACEMENT RAD THERAPY FIELDS: CPT | Mod: 26,,, | Performed by: RADIOLOGY

## 2021-03-31 PROCEDURE — 77014 PR  CT GUIDANCE PLACEMENT RAD THERAPY FIELDS: ICD-10-PCS | Mod: 26,,, | Performed by: RADIOLOGY

## 2021-03-31 PROCEDURE — 77014 HC CT GUIDANCE RADIATION THERAPY FLDS PLACEMENT: CPT | Mod: TC | Performed by: RADIOLOGY

## 2021-04-01 ENCOUNTER — HOSPITAL ENCOUNTER (OUTPATIENT)
Dept: RADIATION THERAPY | Facility: HOSPITAL | Age: 61
Discharge: HOME | End: 2021-04-01
Attending: RADIOLOGY
Payer: COMMERCIAL

## 2021-04-01 ENCOUNTER — DOCUMENTATION ONLY (OUTPATIENT)
Dept: RADIATION ONCOLOGY | Facility: CLINIC | Age: 61
End: 2021-04-01

## 2021-04-01 PROCEDURE — 77385 HC IMRT, SIMPLE: CPT | Performed by: RADIOLOGY

## 2021-04-01 PROCEDURE — 77387 PR GUIDANCE FOR RADIATION TREATMENT DELIVERY: ICD-10-PCS | Mod: 26,,, | Performed by: RADIOLOGY

## 2021-04-01 PROCEDURE — 77387 GUIDANCE FOR RADJ TX DLVR: CPT | Mod: 26,,, | Performed by: RADIOLOGY

## 2021-04-02 ENCOUNTER — IMMUNIZATION (OUTPATIENT)
Dept: PRIMARY CARE CLINIC | Facility: CLINIC | Age: 61
End: 2021-04-02
Payer: COMMERCIAL

## 2021-04-02 DIAGNOSIS — Z23 NEED FOR VACCINATION: Primary | ICD-10-CM

## 2021-04-02 PROCEDURE — 91300 PR SARS-COV- 2 COVID-19 VACCINE, NO PRSV, 30MCG/0.3ML, IM: CPT | Mod: S$GLB,,, | Performed by: INTERNAL MEDICINE

## 2021-04-02 PROCEDURE — 0002A PR IMMUNIZ ADMIN, SARS-COV-2 COVID-19 VACC, 30MCG/0.3ML, 2ND DOSE: ICD-10-PCS | Mod: CV19,S$GLB,, | Performed by: INTERNAL MEDICINE

## 2021-04-02 PROCEDURE — 91300 PR SARS-COV- 2 COVID-19 VACCINE, NO PRSV, 30MCG/0.3ML, IM: ICD-10-PCS | Mod: S$GLB,,, | Performed by: INTERNAL MEDICINE

## 2021-04-02 PROCEDURE — 0002A PR IMMUNIZ ADMIN, SARS-COV-2 COVID-19 VACC, 30MCG/0.3ML, 2ND DOSE: CPT | Mod: CV19,S$GLB,, | Performed by: INTERNAL MEDICINE

## 2021-04-02 RX ADMIN — Medication 0.3 ML: at 12:04

## 2021-04-08 DIAGNOSIS — C61 PROSTATE CANCER: Primary | ICD-10-CM

## 2021-04-14 ENCOUNTER — TELEPHONE (OUTPATIENT)
Dept: RADIATION ONCOLOGY | Facility: CLINIC | Age: 61
End: 2021-04-14

## 2021-05-04 ENCOUNTER — OFFICE VISIT (OUTPATIENT)
Dept: INTERNAL MEDICINE | Facility: CLINIC | Age: 61
End: 2021-05-04
Payer: COMMERCIAL

## 2021-05-04 VITALS
HEIGHT: 67 IN | HEART RATE: 78 BPM | OXYGEN SATURATION: 99 % | DIASTOLIC BLOOD PRESSURE: 80 MMHG | BODY MASS INDEX: 25.06 KG/M2 | SYSTOLIC BLOOD PRESSURE: 130 MMHG | TEMPERATURE: 97 F | WEIGHT: 159.63 LBS

## 2021-05-04 DIAGNOSIS — J02.9 SORE THROAT: Primary | ICD-10-CM

## 2021-05-04 PROCEDURE — 99999 PR PBB SHADOW E&M-EST. PATIENT-LVL IV: ICD-10-PCS | Mod: PBBFAC,,, | Performed by: HOSPITALIST

## 2021-05-04 PROCEDURE — 3008F BODY MASS INDEX DOCD: CPT | Mod: CPTII,S$GLB,, | Performed by: HOSPITALIST

## 2021-05-04 PROCEDURE — 1126F PR PAIN SEVERITY QUANTIFIED, NO PAIN PRESENT: ICD-10-PCS | Mod: S$GLB,,, | Performed by: HOSPITALIST

## 2021-05-04 PROCEDURE — 99999 PR PBB SHADOW E&M-EST. PATIENT-LVL IV: CPT | Mod: PBBFAC,,, | Performed by: HOSPITALIST

## 2021-05-04 PROCEDURE — 3008F PR BODY MASS INDEX (BMI) DOCUMENTED: ICD-10-PCS | Mod: CPTII,S$GLB,, | Performed by: HOSPITALIST

## 2021-05-04 PROCEDURE — 99213 OFFICE O/P EST LOW 20 MIN: CPT | Mod: S$GLB,,, | Performed by: HOSPITALIST

## 2021-05-04 PROCEDURE — 1126F AMNT PAIN NOTED NONE PRSNT: CPT | Mod: S$GLB,,, | Performed by: HOSPITALIST

## 2021-05-04 PROCEDURE — 99213 PR OFFICE/OUTPT VISIT, EST, LEVL III, 20-29 MIN: ICD-10-PCS | Mod: S$GLB,,, | Performed by: HOSPITALIST

## 2021-05-05 ENCOUNTER — PATIENT OUTREACH (OUTPATIENT)
Dept: ADMINISTRATIVE | Facility: OTHER | Age: 61
End: 2021-05-05

## 2021-05-10 ENCOUNTER — OFFICE VISIT (OUTPATIENT)
Dept: OTOLARYNGOLOGY | Facility: CLINIC | Age: 61
End: 2021-05-10
Payer: COMMERCIAL

## 2021-05-10 VITALS
HEIGHT: 67 IN | BODY MASS INDEX: 25.19 KG/M2 | HEART RATE: 76 BPM | DIASTOLIC BLOOD PRESSURE: 96 MMHG | SYSTOLIC BLOOD PRESSURE: 152 MMHG | TEMPERATURE: 97 F | WEIGHT: 160.5 LBS

## 2021-05-10 DIAGNOSIS — K21.9 LARYNGOPHARYNGEAL REFLUX (LPR): Primary | Chronic | ICD-10-CM

## 2021-05-10 DIAGNOSIS — R09.A2 GLOBUS SENSATION: Chronic | ICD-10-CM

## 2021-05-10 DIAGNOSIS — J02.9 SORE THROAT: ICD-10-CM

## 2021-05-10 DIAGNOSIS — J31.0 CHRONIC RHINITIS: Chronic | ICD-10-CM

## 2021-05-10 PROCEDURE — 31575 DIAGNOSTIC LARYNGOSCOPY: CPT | Mod: S$GLB,,, | Performed by: OTOLARYNGOLOGY

## 2021-05-10 PROCEDURE — 31575 LARYNGOSCOPY: ICD-10-PCS | Mod: S$GLB,,, | Performed by: OTOLARYNGOLOGY

## 2021-05-10 PROCEDURE — 99204 PR OFFICE/OUTPT VISIT, NEW, LEVL IV, 45-59 MIN: ICD-10-PCS | Mod: 25,S$GLB,, | Performed by: OTOLARYNGOLOGY

## 2021-05-10 PROCEDURE — 99204 OFFICE O/P NEW MOD 45 MIN: CPT | Mod: 25,S$GLB,, | Performed by: OTOLARYNGOLOGY

## 2021-05-10 PROCEDURE — 1126F PR PAIN SEVERITY QUANTIFIED, NO PAIN PRESENT: ICD-10-PCS | Mod: S$GLB,,, | Performed by: OTOLARYNGOLOGY

## 2021-05-10 PROCEDURE — 3008F BODY MASS INDEX DOCD: CPT | Mod: CPTII,S$GLB,, | Performed by: OTOLARYNGOLOGY

## 2021-05-10 PROCEDURE — 99999 PR PBB SHADOW E&M-EST. PATIENT-LVL IV: ICD-10-PCS | Mod: PBBFAC,,, | Performed by: OTOLARYNGOLOGY

## 2021-05-10 PROCEDURE — 99999 PR PBB SHADOW E&M-EST. PATIENT-LVL IV: CPT | Mod: PBBFAC,,, | Performed by: OTOLARYNGOLOGY

## 2021-05-10 PROCEDURE — 3008F PR BODY MASS INDEX (BMI) DOCUMENTED: ICD-10-PCS | Mod: CPTII,S$GLB,, | Performed by: OTOLARYNGOLOGY

## 2021-05-10 PROCEDURE — 1126F AMNT PAIN NOTED NONE PRSNT: CPT | Mod: S$GLB,,, | Performed by: OTOLARYNGOLOGY

## 2021-05-10 RX ORDER — OMEPRAZOLE 40 MG/1
40 CAPSULE, DELAYED RELEASE ORAL EVERY MORNING
Qty: 30 CAPSULE | Refills: 3 | Status: SHIPPED | OUTPATIENT
Start: 2021-05-10 | End: 2021-07-07 | Stop reason: SDUPTHER

## 2021-05-10 RX ORDER — FLUTICASONE PROPIONATE 50 MCG
2 SPRAY, SUSPENSION (ML) NASAL DAILY
Qty: 9.9 ML | Refills: 11 | Status: SHIPPED | OUTPATIENT
Start: 2021-05-10 | End: 2023-02-08 | Stop reason: SDUPTHER

## 2021-05-25 ENCOUNTER — TELEPHONE (OUTPATIENT)
Dept: RADIATION ONCOLOGY | Facility: CLINIC | Age: 61
End: 2021-05-25

## 2021-06-11 ENCOUNTER — TELEPHONE (OUTPATIENT)
Dept: INTERNAL MEDICINE | Facility: CLINIC | Age: 61
End: 2021-06-11

## 2021-06-11 DIAGNOSIS — Z00.00 ANNUAL PHYSICAL EXAM: Primary | ICD-10-CM

## 2021-06-11 DIAGNOSIS — I10 ESSENTIAL HYPERTENSION: ICD-10-CM

## 2021-06-11 DIAGNOSIS — E78.49 OTHER HYPERLIPIDEMIA: ICD-10-CM

## 2021-07-06 ENCOUNTER — PATIENT OUTREACH (OUTPATIENT)
Dept: ADMINISTRATIVE | Facility: OTHER | Age: 61
End: 2021-07-06

## 2021-07-07 ENCOUNTER — OFFICE VISIT (OUTPATIENT)
Dept: OTOLARYNGOLOGY | Facility: CLINIC | Age: 61
End: 2021-07-07
Payer: COMMERCIAL

## 2021-07-07 VITALS
HEIGHT: 67 IN | DIASTOLIC BLOOD PRESSURE: 85 MMHG | SYSTOLIC BLOOD PRESSURE: 124 MMHG | BODY MASS INDEX: 24.42 KG/M2 | HEART RATE: 73 BPM | WEIGHT: 155.56 LBS

## 2021-07-07 DIAGNOSIS — K21.9 LARYNGOPHARYNGEAL REFLUX (LPR): Primary | Chronic | ICD-10-CM

## 2021-07-07 DIAGNOSIS — R09.A2 GLOBUS SENSATION: ICD-10-CM

## 2021-07-07 DIAGNOSIS — J31.0 CHRONIC RHINITIS: Chronic | ICD-10-CM

## 2021-07-07 PROCEDURE — 31575 DIAGNOSTIC LARYNGOSCOPY: CPT | Mod: S$GLB,,, | Performed by: OTOLARYNGOLOGY

## 2021-07-07 PROCEDURE — 3008F PR BODY MASS INDEX (BMI) DOCUMENTED: ICD-10-PCS | Mod: CPTII,S$GLB,, | Performed by: OTOLARYNGOLOGY

## 2021-07-07 PROCEDURE — 99214 PR OFFICE/OUTPT VISIT, EST, LEVL IV, 30-39 MIN: ICD-10-PCS | Mod: 25,S$GLB,, | Performed by: OTOLARYNGOLOGY

## 2021-07-07 PROCEDURE — 99999 PR PBB SHADOW E&M-EST. PATIENT-LVL III: ICD-10-PCS | Mod: PBBFAC,,, | Performed by: OTOLARYNGOLOGY

## 2021-07-07 PROCEDURE — 99999 PR PBB SHADOW E&M-EST. PATIENT-LVL III: CPT | Mod: PBBFAC,,, | Performed by: OTOLARYNGOLOGY

## 2021-07-07 PROCEDURE — 1126F AMNT PAIN NOTED NONE PRSNT: CPT | Mod: S$GLB,,, | Performed by: OTOLARYNGOLOGY

## 2021-07-07 PROCEDURE — 31575 LARYNGOSCOPY: ICD-10-PCS | Mod: S$GLB,,, | Performed by: OTOLARYNGOLOGY

## 2021-07-07 PROCEDURE — 99214 OFFICE O/P EST MOD 30 MIN: CPT | Mod: 25,S$GLB,, | Performed by: OTOLARYNGOLOGY

## 2021-07-07 PROCEDURE — 1126F PR PAIN SEVERITY QUANTIFIED, NO PAIN PRESENT: ICD-10-PCS | Mod: S$GLB,,, | Performed by: OTOLARYNGOLOGY

## 2021-07-07 PROCEDURE — 3008F BODY MASS INDEX DOCD: CPT | Mod: CPTII,S$GLB,, | Performed by: OTOLARYNGOLOGY

## 2021-07-07 RX ORDER — OMEPRAZOLE 40 MG/1
40 CAPSULE, DELAYED RELEASE ORAL EVERY MORNING
Qty: 30 CAPSULE | Refills: 3 | Status: SHIPPED | OUTPATIENT
Start: 2021-07-07 | End: 2021-11-10 | Stop reason: SDUPTHER

## 2021-07-14 ENCOUNTER — PATIENT OUTREACH (OUTPATIENT)
Dept: ADMINISTRATIVE | Facility: HOSPITAL | Age: 61
End: 2021-07-14

## 2021-07-16 ENCOUNTER — LAB VISIT (OUTPATIENT)
Dept: LAB | Facility: HOSPITAL | Age: 61
End: 2021-07-16
Attending: RADIOLOGY
Payer: COMMERCIAL

## 2021-07-16 DIAGNOSIS — C61 PROSTATE CANCER: ICD-10-CM

## 2021-07-16 LAB — COMPLEXED PSA SERPL-MCNC: <0.01 NG/ML (ref 0–4)

## 2021-07-16 PROCEDURE — 84153 ASSAY OF PSA TOTAL: CPT | Performed by: RADIOLOGY

## 2021-07-16 PROCEDURE — 36415 COLL VENOUS BLD VENIPUNCTURE: CPT | Mod: PO | Performed by: RADIOLOGY

## 2021-07-19 ENCOUNTER — OFFICE VISIT (OUTPATIENT)
Dept: RADIATION ONCOLOGY | Facility: CLINIC | Age: 61
End: 2021-07-19
Attending: RADIOLOGY
Payer: COMMERCIAL

## 2021-07-19 VITALS
HEART RATE: 87 BPM | WEIGHT: 162.88 LBS | RESPIRATION RATE: 16 BRPM | BODY MASS INDEX: 25.56 KG/M2 | DIASTOLIC BLOOD PRESSURE: 81 MMHG | TEMPERATURE: 98 F | SYSTOLIC BLOOD PRESSURE: 141 MMHG | HEIGHT: 67 IN

## 2021-07-19 DIAGNOSIS — C61 PROSTATE CANCER: Primary | ICD-10-CM

## 2021-07-19 PROCEDURE — 3008F PR BODY MASS INDEX (BMI) DOCUMENTED: ICD-10-PCS | Mod: CPTII,S$GLB,, | Performed by: RADIOLOGY

## 2021-07-19 PROCEDURE — 1126F AMNT PAIN NOTED NONE PRSNT: CPT | Mod: CPTII,S$GLB,, | Performed by: RADIOLOGY

## 2021-07-19 PROCEDURE — 3008F BODY MASS INDEX DOCD: CPT | Mod: CPTII,S$GLB,, | Performed by: RADIOLOGY

## 2021-07-19 PROCEDURE — 99999 PR PBB SHADOW E&M-EST. PATIENT-LVL III: ICD-10-PCS | Mod: PBBFAC,,, | Performed by: RADIOLOGY

## 2021-07-19 PROCEDURE — 99499 NO LOS: ICD-10-PCS | Mod: S$GLB,,, | Performed by: RADIOLOGY

## 2021-07-19 PROCEDURE — 99499 UNLISTED E&M SERVICE: CPT | Mod: S$GLB,,, | Performed by: RADIOLOGY

## 2021-07-19 PROCEDURE — 99999 PR PBB SHADOW E&M-EST. PATIENT-LVL III: CPT | Mod: PBBFAC,,, | Performed by: RADIOLOGY

## 2021-07-19 PROCEDURE — 1126F PR PAIN SEVERITY QUANTIFIED, NO PAIN PRESENT: ICD-10-PCS | Mod: CPTII,S$GLB,, | Performed by: RADIOLOGY

## 2021-07-22 ENCOUNTER — OFFICE VISIT (OUTPATIENT)
Dept: URGENT CARE | Facility: CLINIC | Age: 61
End: 2021-07-22
Payer: COMMERCIAL

## 2021-07-22 VITALS
BODY MASS INDEX: 25.43 KG/M2 | SYSTOLIC BLOOD PRESSURE: 134 MMHG | WEIGHT: 162 LBS | HEIGHT: 67 IN | TEMPERATURE: 100 F | HEART RATE: 88 BPM | RESPIRATION RATE: 15 BRPM | OXYGEN SATURATION: 98 % | DIASTOLIC BLOOD PRESSURE: 81 MMHG

## 2021-07-22 DIAGNOSIS — J06.9 VIRAL URI WITH COUGH: Primary | ICD-10-CM

## 2021-07-22 LAB
CTP QC/QA: YES
SARS-COV-2 RDRP RESP QL NAA+PROBE: NEGATIVE

## 2021-07-22 PROCEDURE — 3008F PR BODY MASS INDEX (BMI) DOCUMENTED: ICD-10-PCS | Mod: CPTII,S$GLB,, | Performed by: NURSE PRACTITIONER

## 2021-07-22 PROCEDURE — 3008F BODY MASS INDEX DOCD: CPT | Mod: CPTII,S$GLB,, | Performed by: NURSE PRACTITIONER

## 2021-07-22 PROCEDURE — 3075F SYST BP GE 130 - 139MM HG: CPT | Mod: CPTII,S$GLB,, | Performed by: NURSE PRACTITIONER

## 2021-07-22 PROCEDURE — 3075F PR MOST RECENT SYSTOLIC BLOOD PRESS GE 130-139MM HG: ICD-10-PCS | Mod: CPTII,S$GLB,, | Performed by: NURSE PRACTITIONER

## 2021-07-22 PROCEDURE — 99214 OFFICE O/P EST MOD 30 MIN: CPT | Mod: S$GLB,,, | Performed by: NURSE PRACTITIONER

## 2021-07-22 PROCEDURE — 99214 PR OFFICE/OUTPT VISIT, EST, LEVL IV, 30-39 MIN: ICD-10-PCS | Mod: S$GLB,,, | Performed by: NURSE PRACTITIONER

## 2021-07-22 PROCEDURE — U0002 COVID-19 LAB TEST NON-CDC: HCPCS | Mod: QW,S$GLB,, | Performed by: NURSE PRACTITIONER

## 2021-07-22 PROCEDURE — 3079F DIAST BP 80-89 MM HG: CPT | Mod: CPTII,S$GLB,, | Performed by: NURSE PRACTITIONER

## 2021-07-22 PROCEDURE — 3079F PR MOST RECENT DIASTOLIC BLOOD PRESSURE 80-89 MM HG: ICD-10-PCS | Mod: CPTII,S$GLB,, | Performed by: NURSE PRACTITIONER

## 2021-07-22 PROCEDURE — U0002: ICD-10-PCS | Mod: QW,S$GLB,, | Performed by: NURSE PRACTITIONER

## 2021-07-27 DIAGNOSIS — J06.9 VIRAL URI WITH COUGH: ICD-10-CM

## 2021-07-28 RX ORDER — PROMETHAZINE HYDROCHLORIDE AND DEXTROMETHORPHAN HYDROBROMIDE 6.25; 15 MG/5ML; MG/5ML
5 SYRUP ORAL
Qty: 180 ML | Refills: 1 | Status: SHIPPED | OUTPATIENT
Start: 2021-07-28 | End: 2021-12-20

## 2021-08-20 ENCOUNTER — LAB VISIT (OUTPATIENT)
Dept: LAB | Facility: HOSPITAL | Age: 61
End: 2021-08-20
Attending: INTERNAL MEDICINE
Payer: COMMERCIAL

## 2021-08-20 DIAGNOSIS — I10 ESSENTIAL HYPERTENSION: ICD-10-CM

## 2021-08-20 DIAGNOSIS — Z00.00 ANNUAL PHYSICAL EXAM: ICD-10-CM

## 2021-08-20 DIAGNOSIS — E78.49 OTHER HYPERLIPIDEMIA: ICD-10-CM

## 2021-08-20 LAB
ALBUMIN SERPL BCP-MCNC: 4 G/DL (ref 3.5–5.2)
ALP SERPL-CCNC: 78 U/L (ref 55–135)
ALT SERPL W/O P-5'-P-CCNC: 43 U/L (ref 10–44)
ANION GAP SERPL CALC-SCNC: 9 MMOL/L (ref 8–16)
AST SERPL-CCNC: 26 U/L (ref 10–40)
BASOPHILS # BLD AUTO: 0.02 K/UL (ref 0–0.2)
BASOPHILS NFR BLD: 0.5 % (ref 0–1.9)
BILIRUB SERPL-MCNC: 0.8 MG/DL (ref 0.1–1)
BUN SERPL-MCNC: 15 MG/DL (ref 6–20)
CALCIUM SERPL-MCNC: 9.9 MG/DL (ref 8.7–10.5)
CHLORIDE SERPL-SCNC: 104 MMOL/L (ref 95–110)
CHOLEST SERPL-MCNC: 214 MG/DL (ref 120–199)
CHOLEST/HDLC SERPL: 4.3 {RATIO} (ref 2–5)
CO2 SERPL-SCNC: 28 MMOL/L (ref 23–29)
CREAT SERPL-MCNC: 1 MG/DL (ref 0.5–1.4)
DIFFERENTIAL METHOD: ABNORMAL
EOSINOPHIL # BLD AUTO: 0.2 K/UL (ref 0–0.5)
EOSINOPHIL NFR BLD: 3.9 % (ref 0–8)
ERYTHROCYTE [DISTWIDTH] IN BLOOD BY AUTOMATED COUNT: 12.1 % (ref 11.5–14.5)
EST. GFR  (AFRICAN AMERICAN): >60 ML/MIN/1.73 M^2
EST. GFR  (NON AFRICAN AMERICAN): >60 ML/MIN/1.73 M^2
GLUCOSE SERPL-MCNC: 101 MG/DL (ref 70–110)
HCT VFR BLD AUTO: 39.5 % (ref 40–54)
HDLC SERPL-MCNC: 50 MG/DL (ref 40–75)
HDLC SERPL: 23.4 % (ref 20–50)
HGB BLD-MCNC: 12.8 G/DL (ref 14–18)
IMM GRANULOCYTES # BLD AUTO: 0.01 K/UL (ref 0–0.04)
IMM GRANULOCYTES NFR BLD AUTO: 0.3 % (ref 0–0.5)
LDLC SERPL CALC-MCNC: 136.6 MG/DL (ref 63–159)
LYMPHOCYTES # BLD AUTO: 0.8 K/UL (ref 1–4.8)
LYMPHOCYTES NFR BLD: 20.6 % (ref 18–48)
MCH RBC QN AUTO: 31.1 PG (ref 27–31)
MCHC RBC AUTO-ENTMCNC: 32.4 G/DL (ref 32–36)
MCV RBC AUTO: 96 FL (ref 82–98)
MONOCYTES # BLD AUTO: 0.4 K/UL (ref 0.3–1)
MONOCYTES NFR BLD: 9 % (ref 4–15)
NEUTROPHILS # BLD AUTO: 2.6 K/UL (ref 1.8–7.7)
NEUTROPHILS NFR BLD: 65.7 % (ref 38–73)
NONHDLC SERPL-MCNC: 164 MG/DL
NRBC BLD-RTO: 0 /100 WBC
PLATELET # BLD AUTO: 181 K/UL (ref 150–450)
PMV BLD AUTO: 12.3 FL (ref 9.2–12.9)
POTASSIUM SERPL-SCNC: 4.3 MMOL/L (ref 3.5–5.1)
PROT SERPL-MCNC: 7 G/DL (ref 6–8.4)
RBC # BLD AUTO: 4.11 M/UL (ref 4.6–6.2)
SODIUM SERPL-SCNC: 141 MMOL/L (ref 136–145)
TRIGL SERPL-MCNC: 137 MG/DL (ref 30–150)
TSH SERPL DL<=0.005 MIU/L-ACNC: 3.21 UIU/ML (ref 0.4–4)
WBC # BLD AUTO: 3.89 K/UL (ref 3.9–12.7)

## 2021-08-20 PROCEDURE — 80053 COMPREHEN METABOLIC PANEL: CPT | Performed by: INTERNAL MEDICINE

## 2021-08-20 PROCEDURE — 84443 ASSAY THYROID STIM HORMONE: CPT | Performed by: INTERNAL MEDICINE

## 2021-08-20 PROCEDURE — 36415 COLL VENOUS BLD VENIPUNCTURE: CPT | Mod: PO | Performed by: INTERNAL MEDICINE

## 2021-08-20 PROCEDURE — 85025 COMPLETE CBC W/AUTO DIFF WBC: CPT | Performed by: INTERNAL MEDICINE

## 2021-08-20 PROCEDURE — 80061 LIPID PANEL: CPT | Performed by: INTERNAL MEDICINE

## 2021-08-23 DIAGNOSIS — I10 ESSENTIAL HYPERTENSION: ICD-10-CM

## 2021-08-24 RX ORDER — LOSARTAN POTASSIUM AND HYDROCHLOROTHIAZIDE 12.5; 5 MG/1; MG/1
1 TABLET ORAL DAILY
Qty: 90 TABLET | Refills: 0 | Status: SHIPPED | OUTPATIENT
Start: 2021-08-24 | End: 2021-09-17 | Stop reason: SDUPTHER

## 2021-08-25 ENCOUNTER — LAB VISIT (OUTPATIENT)
Dept: LAB | Facility: HOSPITAL | Age: 61
End: 2021-08-25
Attending: INTERNAL MEDICINE
Payer: COMMERCIAL

## 2021-08-25 ENCOUNTER — OFFICE VISIT (OUTPATIENT)
Dept: INTERNAL MEDICINE | Facility: CLINIC | Age: 61
End: 2021-08-25
Payer: COMMERCIAL

## 2021-08-25 VITALS
BODY MASS INDEX: 25.51 KG/M2 | SYSTOLIC BLOOD PRESSURE: 130 MMHG | WEIGHT: 162.5 LBS | HEART RATE: 80 BPM | DIASTOLIC BLOOD PRESSURE: 80 MMHG | HEIGHT: 67 IN | TEMPERATURE: 98 F | RESPIRATION RATE: 15 BRPM

## 2021-08-25 DIAGNOSIS — C61 PROSTATE CANCER: ICD-10-CM

## 2021-08-25 DIAGNOSIS — Z00.00 ANNUAL PHYSICAL EXAM: Primary | ICD-10-CM

## 2021-08-25 DIAGNOSIS — I10 ESSENTIAL HYPERTENSION: Chronic | ICD-10-CM

## 2021-08-25 DIAGNOSIS — D64.9 ANEMIA, UNSPECIFIED TYPE: ICD-10-CM

## 2021-08-25 DIAGNOSIS — E78.49 OTHER HYPERLIPIDEMIA: ICD-10-CM

## 2021-08-25 DIAGNOSIS — K60.2 ANAL FISSURE: ICD-10-CM

## 2021-08-25 LAB
FERRITIN SERPL-MCNC: 156 NG/ML (ref 20–300)
FOLATE SERPL-MCNC: 8 NG/ML (ref 4–24)
IRON SERPL-MCNC: 90 UG/DL (ref 45–160)
RETICS/RBC NFR AUTO: 2 % (ref 0.4–2)
SATURATED IRON: 24 % (ref 20–50)
TOTAL IRON BINDING CAPACITY: 370 UG/DL (ref 250–450)
TRANSFERRIN SERPL-MCNC: 250 MG/DL (ref 200–375)
VIT B12 SERPL-MCNC: 375 PG/ML (ref 210–950)

## 2021-08-25 PROCEDURE — 1125F PR PAIN SEVERITY QUANTIFIED, PAIN PRESENT: ICD-10-PCS | Mod: CPTII,S$GLB,, | Performed by: INTERNAL MEDICINE

## 2021-08-25 PROCEDURE — 3075F PR MOST RECENT SYSTOLIC BLOOD PRESS GE 130-139MM HG: ICD-10-PCS | Mod: CPTII,S$GLB,, | Performed by: INTERNAL MEDICINE

## 2021-08-25 PROCEDURE — 99396 PREV VISIT EST AGE 40-64: CPT | Mod: S$GLB,,, | Performed by: INTERNAL MEDICINE

## 2021-08-25 PROCEDURE — 3075F SYST BP GE 130 - 139MM HG: CPT | Mod: CPTII,S$GLB,, | Performed by: INTERNAL MEDICINE

## 2021-08-25 PROCEDURE — 3008F PR BODY MASS INDEX (BMI) DOCUMENTED: ICD-10-PCS | Mod: CPTII,S$GLB,, | Performed by: INTERNAL MEDICINE

## 2021-08-25 PROCEDURE — 82728 ASSAY OF FERRITIN: CPT | Performed by: INTERNAL MEDICINE

## 2021-08-25 PROCEDURE — 1160F RVW MEDS BY RX/DR IN RCRD: CPT | Mod: CPTII,S$GLB,, | Performed by: INTERNAL MEDICINE

## 2021-08-25 PROCEDURE — 3079F DIAST BP 80-89 MM HG: CPT | Mod: CPTII,S$GLB,, | Performed by: INTERNAL MEDICINE

## 2021-08-25 PROCEDURE — 99999 PR PBB SHADOW E&M-EST. PATIENT-LVL III: CPT | Mod: PBBFAC,,, | Performed by: INTERNAL MEDICINE

## 2021-08-25 PROCEDURE — 83540 ASSAY OF IRON: CPT | Performed by: INTERNAL MEDICINE

## 2021-08-25 PROCEDURE — 3079F PR MOST RECENT DIASTOLIC BLOOD PRESSURE 80-89 MM HG: ICD-10-PCS | Mod: CPTII,S$GLB,, | Performed by: INTERNAL MEDICINE

## 2021-08-25 PROCEDURE — 1160F PR REVIEW ALL MEDS BY PRESCRIBER/CLIN PHARMACIST DOCUMENTED: ICD-10-PCS | Mod: CPTII,S$GLB,, | Performed by: INTERNAL MEDICINE

## 2021-08-25 PROCEDURE — 3008F BODY MASS INDEX DOCD: CPT | Mod: CPTII,S$GLB,, | Performed by: INTERNAL MEDICINE

## 2021-08-25 PROCEDURE — 82607 VITAMIN B-12: CPT | Performed by: INTERNAL MEDICINE

## 2021-08-25 PROCEDURE — 99396 PR PREVENTIVE VISIT,EST,40-64: ICD-10-PCS | Mod: S$GLB,,, | Performed by: INTERNAL MEDICINE

## 2021-08-25 PROCEDURE — 1159F MED LIST DOCD IN RCRD: CPT | Mod: CPTII,S$GLB,, | Performed by: INTERNAL MEDICINE

## 2021-08-25 PROCEDURE — 99999 PR PBB SHADOW E&M-EST. PATIENT-LVL III: ICD-10-PCS | Mod: PBBFAC,,, | Performed by: INTERNAL MEDICINE

## 2021-08-25 PROCEDURE — 1159F PR MEDICATION LIST DOCUMENTED IN MEDICAL RECORD: ICD-10-PCS | Mod: CPTII,S$GLB,, | Performed by: INTERNAL MEDICINE

## 2021-08-25 PROCEDURE — 82746 ASSAY OF FOLIC ACID SERUM: CPT | Performed by: INTERNAL MEDICINE

## 2021-08-25 PROCEDURE — 85045 AUTOMATED RETICULOCYTE COUNT: CPT | Performed by: INTERNAL MEDICINE

## 2021-08-25 PROCEDURE — 1125F AMNT PAIN NOTED PAIN PRSNT: CPT | Mod: CPTII,S$GLB,, | Performed by: INTERNAL MEDICINE

## 2021-08-25 PROCEDURE — 36415 COLL VENOUS BLD VENIPUNCTURE: CPT | Mod: PO | Performed by: INTERNAL MEDICINE

## 2021-08-26 ENCOUNTER — TELEPHONE (OUTPATIENT)
Dept: INTERNAL MEDICINE | Facility: CLINIC | Age: 61
End: 2021-08-26

## 2021-08-26 DIAGNOSIS — D64.9 ANEMIA, UNSPECIFIED TYPE: Primary | ICD-10-CM

## 2021-09-16 ENCOUNTER — PATIENT MESSAGE (OUTPATIENT)
Dept: INTERNAL MEDICINE | Facility: CLINIC | Age: 61
End: 2021-09-16

## 2021-09-17 DIAGNOSIS — I10 ESSENTIAL HYPERTENSION: ICD-10-CM

## 2021-09-20 RX ORDER — LOSARTAN POTASSIUM AND HYDROCHLOROTHIAZIDE 12.5; 5 MG/1; MG/1
1 TABLET ORAL DAILY
Qty: 90 TABLET | Refills: 3 | Status: SHIPPED | OUTPATIENT
Start: 2021-09-20 | End: 2022-08-29 | Stop reason: SDUPTHER

## 2021-11-10 ENCOUNTER — PATIENT OUTREACH (OUTPATIENT)
Dept: ADMINISTRATIVE | Facility: OTHER | Age: 61
End: 2021-11-10
Payer: COMMERCIAL

## 2021-11-10 ENCOUNTER — OFFICE VISIT (OUTPATIENT)
Dept: OTOLARYNGOLOGY | Facility: CLINIC | Age: 61
End: 2021-11-10
Payer: COMMERCIAL

## 2021-11-10 VITALS
HEIGHT: 67 IN | SYSTOLIC BLOOD PRESSURE: 121 MMHG | DIASTOLIC BLOOD PRESSURE: 77 MMHG | WEIGHT: 171.19 LBS | BODY MASS INDEX: 26.87 KG/M2 | TEMPERATURE: 98 F | HEART RATE: 79 BPM

## 2021-11-10 DIAGNOSIS — K21.9 LARYNGOPHARYNGEAL REFLUX (LPR): Chronic | ICD-10-CM

## 2021-11-10 DIAGNOSIS — R09.A2 GLOBUS SENSATION: ICD-10-CM

## 2021-11-10 DIAGNOSIS — J31.0 CHRONIC RHINITIS: Primary | Chronic | ICD-10-CM

## 2021-11-10 PROCEDURE — 1159F MED LIST DOCD IN RCRD: CPT | Mod: CPTII,S$GLB,, | Performed by: OTOLARYNGOLOGY

## 2021-11-10 PROCEDURE — 3078F PR MOST RECENT DIASTOLIC BLOOD PRESSURE < 80 MM HG: ICD-10-PCS | Mod: CPTII,S$GLB,, | Performed by: OTOLARYNGOLOGY

## 2021-11-10 PROCEDURE — 99214 PR OFFICE/OUTPT VISIT, EST, LEVL IV, 30-39 MIN: ICD-10-PCS | Mod: S$GLB,,, | Performed by: OTOLARYNGOLOGY

## 2021-11-10 PROCEDURE — 99999 PR PBB SHADOW E&M-EST. PATIENT-LVL III: ICD-10-PCS | Mod: PBBFAC,,, | Performed by: OTOLARYNGOLOGY

## 2021-11-10 PROCEDURE — 3078F DIAST BP <80 MM HG: CPT | Mod: CPTII,S$GLB,, | Performed by: OTOLARYNGOLOGY

## 2021-11-10 PROCEDURE — 1159F PR MEDICATION LIST DOCUMENTED IN MEDICAL RECORD: ICD-10-PCS | Mod: CPTII,S$GLB,, | Performed by: OTOLARYNGOLOGY

## 2021-11-10 PROCEDURE — 3074F PR MOST RECENT SYSTOLIC BLOOD PRESSURE < 130 MM HG: ICD-10-PCS | Mod: CPTII,S$GLB,, | Performed by: OTOLARYNGOLOGY

## 2021-11-10 PROCEDURE — 3008F PR BODY MASS INDEX (BMI) DOCUMENTED: ICD-10-PCS | Mod: CPTII,S$GLB,, | Performed by: OTOLARYNGOLOGY

## 2021-11-10 PROCEDURE — 1160F RVW MEDS BY RX/DR IN RCRD: CPT | Mod: CPTII,S$GLB,, | Performed by: OTOLARYNGOLOGY

## 2021-11-10 PROCEDURE — 3008F BODY MASS INDEX DOCD: CPT | Mod: CPTII,S$GLB,, | Performed by: OTOLARYNGOLOGY

## 2021-11-10 PROCEDURE — 3074F SYST BP LT 130 MM HG: CPT | Mod: CPTII,S$GLB,, | Performed by: OTOLARYNGOLOGY

## 2021-11-10 PROCEDURE — 1160F PR REVIEW ALL MEDS BY PRESCRIBER/CLIN PHARMACIST DOCUMENTED: ICD-10-PCS | Mod: CPTII,S$GLB,, | Performed by: OTOLARYNGOLOGY

## 2021-11-10 PROCEDURE — 99214 OFFICE O/P EST MOD 30 MIN: CPT | Mod: S$GLB,,, | Performed by: OTOLARYNGOLOGY

## 2021-11-10 PROCEDURE — 99999 PR PBB SHADOW E&M-EST. PATIENT-LVL III: CPT | Mod: PBBFAC,,, | Performed by: OTOLARYNGOLOGY

## 2021-11-10 RX ORDER — CETIRIZINE HYDROCHLORIDE 10 MG/1
10 TABLET ORAL DAILY
COMMUNITY
End: 2023-08-31

## 2021-11-10 RX ORDER — OMEPRAZOLE 40 MG/1
40 CAPSULE, DELAYED RELEASE ORAL EVERY MORNING
Qty: 30 CAPSULE | Refills: 3 | Status: SHIPPED | OUTPATIENT
Start: 2021-11-10 | End: 2022-12-19

## 2021-11-22 ENCOUNTER — PATIENT MESSAGE (OUTPATIENT)
Dept: INTERNAL MEDICINE | Facility: CLINIC | Age: 61
End: 2021-11-22
Payer: COMMERCIAL

## 2021-11-24 ENCOUNTER — LAB VISIT (OUTPATIENT)
Dept: LAB | Facility: HOSPITAL | Age: 61
End: 2021-11-24
Attending: INTERNAL MEDICINE
Payer: COMMERCIAL

## 2021-11-24 DIAGNOSIS — D64.9 ANEMIA, UNSPECIFIED TYPE: ICD-10-CM

## 2021-11-24 LAB
BASOPHILS # BLD AUTO: 0.02 K/UL (ref 0–0.2)
BASOPHILS NFR BLD: 0.4 % (ref 0–1.9)
DIFFERENTIAL METHOD: ABNORMAL
EOSINOPHIL # BLD AUTO: 0.1 K/UL (ref 0–0.5)
EOSINOPHIL NFR BLD: 2.7 % (ref 0–8)
ERYTHROCYTE [DISTWIDTH] IN BLOOD BY AUTOMATED COUNT: 12.3 % (ref 11.5–14.5)
HCT VFR BLD AUTO: 39.7 % (ref 40–54)
HGB BLD-MCNC: 12.9 G/DL (ref 14–18)
IMM GRANULOCYTES # BLD AUTO: 0.03 K/UL (ref 0–0.04)
IMM GRANULOCYTES NFR BLD AUTO: 0.7 % (ref 0–0.5)
LYMPHOCYTES # BLD AUTO: 1.1 K/UL (ref 1–4.8)
LYMPHOCYTES NFR BLD: 23.5 % (ref 18–48)
MCH RBC QN AUTO: 30.6 PG (ref 27–31)
MCHC RBC AUTO-ENTMCNC: 32.5 G/DL (ref 32–36)
MCV RBC AUTO: 94 FL (ref 82–98)
MONOCYTES # BLD AUTO: 0.6 K/UL (ref 0.3–1)
MONOCYTES NFR BLD: 13.3 % (ref 4–15)
NEUTROPHILS # BLD AUTO: 2.7 K/UL (ref 1.8–7.7)
NEUTROPHILS NFR BLD: 59.4 % (ref 38–73)
NRBC BLD-RTO: 0 /100 WBC
PLATELET # BLD AUTO: 177 K/UL (ref 150–450)
PMV BLD AUTO: 12.4 FL (ref 9.2–12.9)
RBC # BLD AUTO: 4.22 M/UL (ref 4.6–6.2)
WBC # BLD AUTO: 4.51 K/UL (ref 3.9–12.7)

## 2021-11-24 PROCEDURE — 85025 COMPLETE CBC W/AUTO DIFF WBC: CPT | Performed by: INTERNAL MEDICINE

## 2021-11-24 PROCEDURE — 36415 COLL VENOUS BLD VENIPUNCTURE: CPT | Mod: PO | Performed by: INTERNAL MEDICINE

## 2021-11-27 ENCOUNTER — PATIENT MESSAGE (OUTPATIENT)
Dept: INTERNAL MEDICINE | Facility: CLINIC | Age: 61
End: 2021-11-27
Payer: COMMERCIAL

## 2021-11-27 ENCOUNTER — TELEPHONE (OUTPATIENT)
Dept: INTERNAL MEDICINE | Facility: CLINIC | Age: 61
End: 2021-11-27
Payer: COMMERCIAL

## 2021-11-27 DIAGNOSIS — D64.9 ANEMIA, UNSPECIFIED TYPE: Primary | ICD-10-CM

## 2021-12-15 ENCOUNTER — LAB VISIT (OUTPATIENT)
Dept: LAB | Facility: HOSPITAL | Age: 61
End: 2021-12-15
Attending: RADIOLOGY
Payer: COMMERCIAL

## 2021-12-15 DIAGNOSIS — C61 PROSTATE CANCER: ICD-10-CM

## 2021-12-15 LAB
COMPLEXED PSA SERPL-MCNC: <0.01 NG/ML (ref 0–4)
TESTOST SERPL-MCNC: 549 NG/DL (ref 304–1227)

## 2021-12-15 PROCEDURE — 84403 ASSAY OF TOTAL TESTOSTERONE: CPT | Performed by: RADIOLOGY

## 2021-12-15 PROCEDURE — 36415 COLL VENOUS BLD VENIPUNCTURE: CPT | Performed by: RADIOLOGY

## 2021-12-15 PROCEDURE — 84153 ASSAY OF PSA TOTAL: CPT | Performed by: RADIOLOGY

## 2021-12-20 ENCOUNTER — OFFICE VISIT (OUTPATIENT)
Dept: RADIATION ONCOLOGY | Facility: CLINIC | Age: 61
End: 2021-12-20
Attending: RADIOLOGY
Payer: COMMERCIAL

## 2021-12-20 ENCOUNTER — TELEPHONE (OUTPATIENT)
Dept: RADIATION ONCOLOGY | Facility: CLINIC | Age: 61
End: 2021-12-20
Payer: COMMERCIAL

## 2021-12-20 VITALS
DIASTOLIC BLOOD PRESSURE: 84 MMHG | SYSTOLIC BLOOD PRESSURE: 143 MMHG | WEIGHT: 173 LBS | HEIGHT: 67 IN | BODY MASS INDEX: 27.15 KG/M2 | RESPIRATION RATE: 16 BRPM | HEART RATE: 71 BPM

## 2021-12-20 DIAGNOSIS — C61 PROSTATE CANCER: Primary | ICD-10-CM

## 2021-12-20 DIAGNOSIS — N52.31 ERECTILE DYSFUNCTION FOLLOWING RADICAL PROSTATECTOMY: ICD-10-CM

## 2021-12-20 PROCEDURE — 99999 PR PBB SHADOW E&M-EST. PATIENT-LVL III: ICD-10-PCS | Mod: PBBFAC,,, | Performed by: RADIOLOGY

## 2021-12-20 PROCEDURE — 99999 PR PBB SHADOW E&M-EST. PATIENT-LVL III: CPT | Mod: PBBFAC,,, | Performed by: RADIOLOGY

## 2021-12-20 PROCEDURE — 99212 PR OFFICE/OUTPT VISIT, EST, LEVL II, 10-19 MIN: ICD-10-PCS | Mod: S$GLB,,, | Performed by: RADIOLOGY

## 2021-12-20 PROCEDURE — 99212 OFFICE O/P EST SF 10 MIN: CPT | Mod: S$GLB,,, | Performed by: RADIOLOGY

## 2021-12-20 RX ORDER — SILDENAFIL 100 MG/1
100 TABLET, FILM COATED ORAL DAILY PRN
Qty: 10 TABLET | Refills: 6 | Status: SHIPPED | OUTPATIENT
Start: 2021-12-20 | End: 2021-12-22 | Stop reason: SDUPTHER

## 2021-12-22 DIAGNOSIS — N52.31 ERECTILE DYSFUNCTION FOLLOWING RADICAL PROSTATECTOMY: ICD-10-CM

## 2021-12-22 DIAGNOSIS — C61 PROSTATE CANCER: ICD-10-CM

## 2021-12-22 RX ORDER — SILDENAFIL 100 MG/1
100 TABLET, FILM COATED ORAL DAILY PRN
Qty: 10 TABLET | Refills: 6 | Status: SHIPPED | OUTPATIENT
Start: 2021-12-22 | End: 2022-12-19 | Stop reason: SDUPTHER

## 2021-12-23 ENCOUNTER — PATIENT MESSAGE (OUTPATIENT)
Dept: UROLOGY | Facility: CLINIC | Age: 61
End: 2021-12-23
Payer: COMMERCIAL

## 2021-12-29 ENCOUNTER — TELEPHONE (OUTPATIENT)
Dept: INTERNAL MEDICINE | Facility: CLINIC | Age: 61
End: 2021-12-29
Payer: COMMERCIAL

## 2021-12-29 ENCOUNTER — LAB VISIT (OUTPATIENT)
Dept: PRIMARY CARE CLINIC | Facility: OTHER | Age: 61
End: 2021-12-29
Attending: INTERNAL MEDICINE
Payer: COMMERCIAL

## 2021-12-29 ENCOUNTER — PATIENT MESSAGE (OUTPATIENT)
Dept: ADMINISTRATIVE | Facility: OTHER | Age: 61
End: 2021-12-29
Payer: COMMERCIAL

## 2021-12-29 DIAGNOSIS — Z20.822 ENCOUNTER FOR LABORATORY TESTING FOR COVID-19 VIRUS: ICD-10-CM

## 2021-12-29 PROCEDURE — U0003 INFECTIOUS AGENT DETECTION BY NUCLEIC ACID (DNA OR RNA); SEVERE ACUTE RESPIRATORY SYNDROME CORONAVIRUS 2 (SARS-COV-2) (CORONAVIRUS DISEASE [COVID-19]), AMPLIFIED PROBE TECHNIQUE, MAKING USE OF HIGH THROUGHPUT TECHNOLOGIES AS DESCRIBED BY CMS-2020-01-R: HCPCS | Performed by: INTERNAL MEDICINE

## 2021-12-29 RX ORDER — AZITHROMYCIN 250 MG/1
TABLET, FILM COATED ORAL
Qty: 6 TABLET | Refills: 0 | Status: SHIPPED | OUTPATIENT
Start: 2021-12-29 | End: 2022-01-03

## 2021-12-29 RX ORDER — PROMETHAZINE HYDROCHLORIDE AND DEXTROMETHORPHAN HYDROBROMIDE 6.25; 15 MG/5ML; MG/5ML
5 SYRUP ORAL EVERY 6 HOURS PRN
Qty: 240 ML | Refills: 0 | Status: SHIPPED | OUTPATIENT
Start: 2021-12-29 | End: 2022-01-08

## 2021-12-30 ENCOUNTER — PATIENT MESSAGE (OUTPATIENT)
Dept: ADMINISTRATIVE | Facility: OTHER | Age: 61
End: 2021-12-30
Payer: COMMERCIAL

## 2022-01-01 LAB
SARS-COV-2 RNA RESP QL NAA+PROBE: DETECTED
TEST PERFORMANCE INFO SPEC: ABNORMAL

## 2022-01-03 DIAGNOSIS — U07.1 COVID-19 VIRUS DETECTED: ICD-10-CM

## 2022-01-05 ENCOUNTER — PATIENT OUTREACH (OUTPATIENT)
Dept: ADMINISTRATIVE | Facility: OTHER | Age: 62
End: 2022-01-05
Payer: COMMERCIAL

## 2022-01-06 ENCOUNTER — OFFICE VISIT (OUTPATIENT)
Dept: DERMATOLOGY | Facility: CLINIC | Age: 62
End: 2022-01-06
Payer: COMMERCIAL

## 2022-01-06 DIAGNOSIS — D18.01 CHERRY ANGIOMA: ICD-10-CM

## 2022-01-06 DIAGNOSIS — D48.5 NEOPLASM OF UNCERTAIN BEHAVIOR OF SKIN: Primary | ICD-10-CM

## 2022-01-06 DIAGNOSIS — L81.4 LENTIGO: ICD-10-CM

## 2022-01-06 DIAGNOSIS — D22.9 NEVUS: ICD-10-CM

## 2022-01-06 DIAGNOSIS — L82.1 SK (SEBORRHEIC KERATOSIS): ICD-10-CM

## 2022-01-06 PROCEDURE — 1159F PR MEDICATION LIST DOCUMENTED IN MEDICAL RECORD: ICD-10-PCS | Mod: CPTII,S$GLB,, | Performed by: DERMATOLOGY

## 2022-01-06 PROCEDURE — 11102 TANGNTL BX SKIN SINGLE LES: CPT | Mod: S$GLB,,, | Performed by: DERMATOLOGY

## 2022-01-06 PROCEDURE — 1160F PR REVIEW ALL MEDS BY PRESCRIBER/CLIN PHARMACIST DOCUMENTED: ICD-10-PCS | Mod: CPTII,S$GLB,, | Performed by: DERMATOLOGY

## 2022-01-06 PROCEDURE — 88305 TISSUE EXAM BY PATHOLOGIST: ICD-10-PCS | Mod: 26,,, | Performed by: PATHOLOGY

## 2022-01-06 PROCEDURE — 99999 PR PBB SHADOW E&M-EST. PATIENT-LVL III: ICD-10-PCS | Mod: PBBFAC,,, | Performed by: DERMATOLOGY

## 2022-01-06 PROCEDURE — 1160F RVW MEDS BY RX/DR IN RCRD: CPT | Mod: CPTII,S$GLB,, | Performed by: DERMATOLOGY

## 2022-01-06 PROCEDURE — 88305 TISSUE EXAM BY PATHOLOGIST: CPT | Mod: 26,,, | Performed by: PATHOLOGY

## 2022-01-06 PROCEDURE — 99203 OFFICE O/P NEW LOW 30 MIN: CPT | Mod: 25,S$GLB,, | Performed by: DERMATOLOGY

## 2022-01-06 PROCEDURE — 1159F MED LIST DOCD IN RCRD: CPT | Mod: CPTII,S$GLB,, | Performed by: DERMATOLOGY

## 2022-01-06 PROCEDURE — 99203 PR OFFICE/OUTPT VISIT, NEW, LEVL III, 30-44 MIN: ICD-10-PCS | Mod: 25,S$GLB,, | Performed by: DERMATOLOGY

## 2022-01-06 PROCEDURE — 99999 PR PBB SHADOW E&M-EST. PATIENT-LVL III: CPT | Mod: PBBFAC,,, | Performed by: DERMATOLOGY

## 2022-01-06 PROCEDURE — 88305 TISSUE EXAM BY PATHOLOGIST: CPT | Performed by: PATHOLOGY

## 2022-01-06 PROCEDURE — 11102 PR TANGENTIAL BIOPSY, SKIN, SINGLE LESION: ICD-10-PCS | Mod: S$GLB,,, | Performed by: DERMATOLOGY

## 2022-01-06 NOTE — PATIENT INSTRUCTIONS
Shave Biopsy Wound Care    Your doctor has performed a shave biopsy today.  A band aid and vaseline ointment has been placed over the site.  This should remain in place for NO LONGER THAN 48 hours.  It is fine to remove the bandaid after 24 hours, if the area is no longer bleeding. It is recommended that you keep the area dry (do not wet)) for the first 24 hours.  After 24 hours, wash the area with warm soap and water and apply Vaseline jelly.  Many patients prefer to use Neosporin or Bacitracin ointment.  This is acceptable; however, know that you can develop an allergy to this medication even if you have used it safely for years.  It is important to keep the area moist.  Letting it dry out and get air slows healing time, and will worsen the scar.        If you notice increasing redness, tenderness, pain, or yellow drainage at the biopsy site, please notify your doctor.  These are signs of an infection.    If your biopsy site is bleeding, apply firm pressure for 15 minutes straight.  Repeat for another 15 minutes, if it is still bleeding.   If the surgical site continues to bleed, then please contact your doctor.      For [facility] users:   You will receive your biopsy results in [facility] as soon as they are available. Please be assured that your physician/provider will review your results and will then determine what further treatment, evaluation, or planning is required. You should be contacted by your physician's/provider's office within 5 business days of receiving your results; If not, please reach out to directly. This is one more way [facility] is putting you first.     [facility] [place] [address]/ [phone] FAX/ [email or web address]

## 2022-01-06 NOTE — PROGRESS NOTES
"  Subjective:       Patient ID:  Nilton Chin is a 61 y.o. male who presents for   Chief Complaint   Patient presents with    Skin Check     ubse    Lesion     R thumb     Pt here today for UBSE  Pt c/o lesion to R thumb over 1yr. occ itch. No prev tx    Patient is here today for a "mole" check.   Pt has a history of  moderate sun exposure in the past.   Pt recalls several blistering sunburns in the past- no  Pt has history of tanning bed use- no  Pt has  had moles removed in the past- no  Pt has history of melanoma in first degree relatives-  no      Lesion        Review of Systems   Skin: Positive for activity-related sunscreen use. Negative for daily sunscreen use and tendency to form keloidal scars.   Hematologic/Lymphatic: Does not bruise/bleed easily.        Objective:    Physical Exam   Constitutional: He appears well-developed and well-nourished. No distress.   Neurological: He is alert and oriented to person, place, and time. He is not disoriented.   Psychiatric: He has a normal mood and affect.   Skin:   Areas Examined (abnormalities noted in diagram):   Scalp / Hair Palpated and Inspected  Head / Face Inspection Performed  Neck Inspection Performed  Chest / Axilla Inspection Performed  Abdomen Inspection Performed  Back Inspection Performed  RUE Inspected  LUE Inspection Performed  Nails and Digits Inspection Performed                           Diagram Legend     Erythematous scaling macule/papule c/w actinic keratosis       Vascular papule c/w angioma      Pigmented verrucoid papule/plaque c/w seborrheic keratosis      Yellow umbilicated papule c/w sebaceous hyperplasia      Irregularly shaped tan macule c/w lentigo     1-2 mm smooth white papules consistent with Milia      Movable subcutaneous cyst with punctum c/w epidermal inclusion cyst      Subcutaneous movable cyst c/w pilar cyst      Firm pink to brown papule c/w dermatofibroma      Pedunculated fleshy papule(s) c/w skin tag(s)      Evenly " pigmented macule c/w junctional nevus     Mildly variegated pigmented, slightly irregular-bordered macule c/w mildly atypical nevus      Flesh colored to evenly pigmented papule c/w intradermal nevus       Pink pearly papule/plaque c/w basal cell carcinoma      Erythematous hyperkeratotic cursted plaque c/w SCC      Surgical scar with no sign of skin cancer recurrence      Open and closed comedones      Inflammatory papules and pustules      Verrucoid papule consistent consistent with wart     Erythematous eczematous patches and plaques     Dystrophic onycholytic nail with subungual debris c/w onychomycosis     Umbilicated papule    Erythematous-base heme-crusted tan verrucoid plaque consistent with inflamed seborrheic keratosis     Erythematous Silvery Scaling Plaque c/w Psoriasis     See annotation      Assessment / Plan:      Pathology Orders:     Normal Orders This Visit    Specimen to Pathology, Dermatology     Comments:    Number of Specimens:->1  ------------------------->-------------------------  Spec 1 Procedure:->Biopsy  Spec 1 Clinical Impression:->r/o inflamed keratosis v other  Spec 1 Source:->left inferior thumb    Questions:    Procedure Type: Dermatology and skin neoplasms    Number of Specimens: 1    ------------------------: -------------------------    Spec 1 Procedure: Biopsy    Spec 1 Clinical Impression: r/o inflamed keratosis v other    Spec 1 Source: left inferior thumb    Release to patient:         Neoplasm of uncertain behavior of skin  Shave biopsy procedure note:    Shave biopsy performed after verbal consent including risk of infection, scar, recurrence, need for additional treatment of site. Area prepped with alcohol, anesthetized with approximately 1.0cc of 1% lidocaine with epinephrine. Lesional tissue shaved with razor blade. Hemostasis achieved with application of aluminum chloride followed by hyfrecation. No complications. Dressing applied. Wound care explained.      -      Specimen to Pathology, Dermatology    SK (seborrheic keratosis)  These are benign inherited growths without a malignant potential. Reassurance given to patient. No treatment is necessary.     Lentigo  This is a benign hyperpigmented sun induced lesion. Recommend daily sun protection/avoidance and use of at least SPF 30, broad spectrum sunscreen (OTC drug) will reduce the number of new lesions. Treatment of these benign lesions are considered cosmetic.    The nature of sun-induced photo-aging and skin cancers is discussed.  Sun avoidance, protective clothing, and the use of 30-SPF sunscreens is advised. Observe closely for skin damage/changes, and call if such occurs.    Cherry angioma  These are benign vascular lesions that are inherited.  Treatment is not necessary.    Nevus  Discussed ABCDE's of nevi.  Monitor for new mole or moles that are becoming bigger, darker, irritated, or developing irregular borders. Brochure provided. Instructed patient to observe lesion(s) for changes and follow up in clinic if changes are noted. Patient to monitor skin at home for new or changing lesions.     Upper body skin examination performed today including at least 6 points as noted in physical examination. Suspicious lesions noted.    Recommend daily sun protection/avoidance and use of at least SPF 30, broad spectrum sunscreen (OTC drug).              Follow up in about 2 years (around 1/6/2024) for prn bx report.

## 2022-01-10 ENCOUNTER — PATIENT MESSAGE (OUTPATIENT)
Dept: DERMATOLOGY | Facility: CLINIC | Age: 62
End: 2022-01-10
Payer: COMMERCIAL

## 2022-01-10 LAB
FINAL PATHOLOGIC DIAGNOSIS: NORMAL
GROSS: NORMAL
Lab: NORMAL
MICROSCOPIC EXAM: NORMAL

## 2022-01-10 NOTE — PROGRESS NOTES
Mr. Chin, your pathology report indicates a benign, non cancerous lesion. No further treatment is needed at this time. Please schedule a follow up appointment in 2 years. Thank you for allowing me to care for you and take care, Dr. Lay    Skin, right inferior thumb, shave biopsy:   -SOLAR LENTIGO, IRRITATED AND INFLAMED   This lesion is benign

## 2022-02-22 ENCOUNTER — LAB VISIT (OUTPATIENT)
Dept: LAB | Facility: HOSPITAL | Age: 62
End: 2022-02-22
Attending: INTERNAL MEDICINE
Payer: COMMERCIAL

## 2022-02-22 DIAGNOSIS — D64.9 ANEMIA, UNSPECIFIED TYPE: ICD-10-CM

## 2022-02-22 LAB
BASOPHILS # BLD AUTO: 0.03 K/UL (ref 0–0.2)
BASOPHILS NFR BLD: 0.7 % (ref 0–1.9)
DIFFERENTIAL METHOD: ABNORMAL
EOSINOPHIL # BLD AUTO: 0.1 K/UL (ref 0–0.5)
EOSINOPHIL NFR BLD: 2.6 % (ref 0–8)
ERYTHROCYTE [DISTWIDTH] IN BLOOD BY AUTOMATED COUNT: 12.7 % (ref 11.5–14.5)
FERRITIN SERPL-MCNC: 95 NG/ML (ref 20–300)
HCT VFR BLD AUTO: 40.4 % (ref 40–54)
HGB BLD-MCNC: 12.9 G/DL (ref 14–18)
IMM GRANULOCYTES # BLD AUTO: 0.01 K/UL (ref 0–0.04)
IMM GRANULOCYTES NFR BLD AUTO: 0.2 % (ref 0–0.5)
IRON SERPL-MCNC: 78 UG/DL (ref 45–160)
LYMPHOCYTES # BLD AUTO: 1 K/UL (ref 1–4.8)
LYMPHOCYTES NFR BLD: 24 % (ref 18–48)
MCH RBC QN AUTO: 31.1 PG (ref 27–31)
MCHC RBC AUTO-ENTMCNC: 31.9 G/DL (ref 32–36)
MCV RBC AUTO: 97 FL (ref 82–98)
MONOCYTES # BLD AUTO: 0.5 K/UL (ref 0.3–1)
MONOCYTES NFR BLD: 11 % (ref 4–15)
NEUTROPHILS # BLD AUTO: 2.6 K/UL (ref 1.8–7.7)
NEUTROPHILS NFR BLD: 61.5 % (ref 38–73)
NRBC BLD-RTO: 0 /100 WBC
PLATELET # BLD AUTO: 174 K/UL (ref 150–450)
PMV BLD AUTO: 12.4 FL (ref 9.2–12.9)
RBC # BLD AUTO: 4.15 M/UL (ref 4.6–6.2)
RETICS/RBC NFR AUTO: 1.9 % (ref 0.4–2)
SATURATED IRON: 20 % (ref 20–50)
TOTAL IRON BINDING CAPACITY: 394 UG/DL (ref 250–450)
TRANSFERRIN SERPL-MCNC: 266 MG/DL (ref 200–375)
WBC # BLD AUTO: 4.17 K/UL (ref 3.9–12.7)

## 2022-02-22 PROCEDURE — 82728 ASSAY OF FERRITIN: CPT | Performed by: INTERNAL MEDICINE

## 2022-02-22 PROCEDURE — 85045 AUTOMATED RETICULOCYTE COUNT: CPT | Performed by: INTERNAL MEDICINE

## 2022-02-22 PROCEDURE — 84466 ASSAY OF TRANSFERRIN: CPT | Performed by: INTERNAL MEDICINE

## 2022-02-22 PROCEDURE — 36415 COLL VENOUS BLD VENIPUNCTURE: CPT | Mod: PO | Performed by: INTERNAL MEDICINE

## 2022-02-22 PROCEDURE — 85025 COMPLETE CBC W/AUTO DIFF WBC: CPT | Performed by: INTERNAL MEDICINE

## 2022-03-16 ENCOUNTER — OFFICE VISIT (OUTPATIENT)
Dept: OPTOMETRY | Facility: CLINIC | Age: 62
End: 2022-03-16
Payer: COMMERCIAL

## 2022-03-16 DIAGNOSIS — H52.4 PRESBYOPIA: ICD-10-CM

## 2022-03-16 DIAGNOSIS — H25.13 NUCLEAR SCLEROSIS, BILATERAL: Primary | ICD-10-CM

## 2022-03-16 DIAGNOSIS — Z83.511 FAMILY HISTORY OF GLAUCOMA: ICD-10-CM

## 2022-03-16 PROCEDURE — 92015 PR REFRACTION: ICD-10-PCS | Mod: S$GLB,,, | Performed by: OPTOMETRIST

## 2022-03-16 PROCEDURE — 92014 PR EYE EXAM, EST PATIENT,COMPREHESV: ICD-10-PCS | Mod: S$GLB,,, | Performed by: OPTOMETRIST

## 2022-03-16 PROCEDURE — 92015 DETERMINE REFRACTIVE STATE: CPT | Mod: S$GLB,,, | Performed by: OPTOMETRIST

## 2022-03-16 PROCEDURE — 1159F PR MEDICATION LIST DOCUMENTED IN MEDICAL RECORD: ICD-10-PCS | Mod: CPTII,S$GLB,, | Performed by: OPTOMETRIST

## 2022-03-16 PROCEDURE — 99999 PR PBB SHADOW E&M-EST. PATIENT-LVL III: CPT | Mod: PBBFAC,,, | Performed by: OPTOMETRIST

## 2022-03-16 PROCEDURE — 1160F RVW MEDS BY RX/DR IN RCRD: CPT | Mod: CPTII,S$GLB,, | Performed by: OPTOMETRIST

## 2022-03-16 PROCEDURE — 92014 COMPRE OPH EXAM EST PT 1/>: CPT | Mod: S$GLB,,, | Performed by: OPTOMETRIST

## 2022-03-16 PROCEDURE — 1160F PR REVIEW ALL MEDS BY PRESCRIBER/CLIN PHARMACIST DOCUMENTED: ICD-10-PCS | Mod: CPTII,S$GLB,, | Performed by: OPTOMETRIST

## 2022-03-16 PROCEDURE — 99999 PR PBB SHADOW E&M-EST. PATIENT-LVL III: ICD-10-PCS | Mod: PBBFAC,,, | Performed by: OPTOMETRIST

## 2022-03-16 PROCEDURE — 1159F MED LIST DOCD IN RCRD: CPT | Mod: CPTII,S$GLB,, | Performed by: OPTOMETRIST

## 2022-03-16 NOTE — PROGRESS NOTES
HPI     60 Y/o male is here for routine eye exam with no C/o about ocular health   Pt wears OTC readers +2.50  Pt denies pain and discomfort   No f/f    Eye med: Systane Balance QID OU     Last edited by Dorothy Geiger MA on 3/16/2022 10:00 AM. (History)        ROS     Positive for: Skin (rosacea), Eyes (ocular rosacea hx)    Negative for: Constitutional, Gastrointestinal, Neurological,   Genitourinary, Musculoskeletal, HENT, Endocrine, Cardiovascular,   Respiratory, Psychiatric, Allergic/Imm, Heme/Lymph    Last edited by Arnulfo Blake, OD on 3/16/2022 10:02 AM. (History)        Assessment /Plan     For exam results, see Encounter Report.    Nuclear sclerosis, bilateral    Presbyopia    Family history of glaucoma      1. Small cats--pt happy w otc readers.  Discussed could use otc +1.00 for distance  2. Family History of Glaucoma (dad).  Advised yearly exam   3. Ocular rosacea hx--see prev notes.  Pt doing well on otc SYSTANE BAL ATs QID for now      PLAN:    rtc 1 yr

## 2022-04-06 ENCOUNTER — LAB VISIT (OUTPATIENT)
Dept: LAB | Facility: HOSPITAL | Age: 62
End: 2022-04-06
Attending: UROLOGY
Payer: COMMERCIAL

## 2022-04-06 DIAGNOSIS — Z87.898 HISTORY OF DYSURIA: ICD-10-CM

## 2022-04-06 DIAGNOSIS — C61 PROSTATE CANCER: Primary | ICD-10-CM

## 2022-04-06 DIAGNOSIS — C61 PROSTATE CANCER: ICD-10-CM

## 2022-04-06 LAB
BILIRUB UR QL STRIP: NEGATIVE
CLARITY UR REFRACT.AUTO: CLEAR
COLOR UR AUTO: ABNORMAL
GLUCOSE UR QL STRIP: NEGATIVE
HGB UR QL STRIP: ABNORMAL
KETONES UR QL STRIP: NEGATIVE
LEUKOCYTE ESTERASE UR QL STRIP: NEGATIVE
MICROSCOPIC COMMENT: NORMAL
NITRITE UR QL STRIP: NEGATIVE
PH UR STRIP: 6 [PH] (ref 5–8)
PROT UR QL STRIP: NEGATIVE
RBC #/AREA URNS AUTO: 0 /HPF (ref 0–4)
SP GR UR STRIP: 1.01 (ref 1–1.03)
URN SPEC COLLECT METH UR: ABNORMAL
WBC #/AREA URNS AUTO: 0 /HPF (ref 0–5)

## 2022-04-06 PROCEDURE — 81001 URINALYSIS AUTO W/SCOPE: CPT | Performed by: UROLOGY

## 2022-04-06 PROCEDURE — 87086 URINE CULTURE/COLONY COUNT: CPT | Performed by: UROLOGY

## 2022-04-07 LAB — BACTERIA UR CULT: NO GROWTH

## 2022-04-19 ENCOUNTER — PATIENT MESSAGE (OUTPATIENT)
Dept: UROLOGY | Facility: CLINIC | Age: 62
End: 2022-04-19
Payer: COMMERCIAL

## 2022-04-19 ENCOUNTER — TELEPHONE (OUTPATIENT)
Dept: UROLOGY | Facility: CLINIC | Age: 62
End: 2022-04-19
Payer: COMMERCIAL

## 2022-04-19 NOTE — TELEPHONE ENCOUNTER
I spoke with patient, who stated he was still having pressure to defecate, and urge to urinate, I advised patient he should come in for eval and give another urine sample , patient advised that he could continue the azo standard OTC per Conchita Mejia NP. Patient agreed to appt with Edelmira Baca NP.      Ambrosio Lawrence,  I have been in communication with Ivan from your staff recently regarding symptoms and he suggested I use over the counter AZO, which did help with the burning after urination feeling, but not the frequency to urinate and I also have the urge to push when I don't need to have a bowel movement.  I assume Dr. Tesfaye released me back to you since I have a PSA check up in June.     Thanks,  Nilton Chin

## 2022-04-26 ENCOUNTER — OFFICE VISIT (OUTPATIENT)
Dept: UROLOGY | Facility: CLINIC | Age: 62
End: 2022-04-26
Payer: COMMERCIAL

## 2022-04-26 VITALS
BODY MASS INDEX: 26.1 KG/M2 | DIASTOLIC BLOOD PRESSURE: 89 MMHG | WEIGHT: 166.69 LBS | HEART RATE: 75 BPM | SYSTOLIC BLOOD PRESSURE: 149 MMHG

## 2022-04-26 DIAGNOSIS — R39.15 URGENCY OF URINATION: ICD-10-CM

## 2022-04-26 DIAGNOSIS — R30.0 DYSURIA: Primary | ICD-10-CM

## 2022-04-26 DIAGNOSIS — C61 PROSTATE CANCER: ICD-10-CM

## 2022-04-26 LAB
BILIRUB SERPL-MCNC: NORMAL MG/DL
BLOOD URINE, POC: NORMAL
CLARITY, POC UA: CLEAR
COLOR, POC UA: YELLOW
GLUCOSE UR QL STRIP: NORMAL
KETONES UR QL STRIP: NORMAL
LEUKOCYTE ESTERASE URINE, POC: NORMAL
NITRITE, POC UA: NORMAL
PH, POC UA: 8
POC RESIDUAL URINE VOLUME: 11 ML (ref 0–100)
PROTEIN, POC: NORMAL
SPECIFIC GRAVITY, POC UA: 1
UROBILINOGEN, POC UA: NORMAL

## 2022-04-26 PROCEDURE — 87563 M. GENITALIUM AMP PROBE: CPT

## 2022-04-26 PROCEDURE — 3079F PR MOST RECENT DIASTOLIC BLOOD PRESSURE 80-89 MM HG: ICD-10-PCS | Mod: CPTII,S$GLB,, | Performed by: NURSE PRACTITIONER

## 2022-04-26 PROCEDURE — 99214 PR OFFICE/OUTPT VISIT, EST, LEVL IV, 30-39 MIN: ICD-10-PCS | Mod: S$GLB,,, | Performed by: NURSE PRACTITIONER

## 2022-04-26 PROCEDURE — 87086 URINE CULTURE/COLONY COUNT: CPT

## 2022-04-26 PROCEDURE — 1160F PR REVIEW ALL MEDS BY PRESCRIBER/CLIN PHARMACIST DOCUMENTED: ICD-10-PCS | Mod: CPTII,S$GLB,, | Performed by: NURSE PRACTITIONER

## 2022-04-26 PROCEDURE — 99999 PR PBB SHADOW E&M-EST. PATIENT-LVL III: CPT | Mod: PBBFAC,,, | Performed by: NURSE PRACTITIONER

## 2022-04-26 PROCEDURE — 51798 US URINE CAPACITY MEASURE: CPT | Mod: S$GLB,,, | Performed by: NURSE PRACTITIONER

## 2022-04-26 PROCEDURE — 87798 DETECT AGENT NOS DNA AMP: CPT

## 2022-04-26 PROCEDURE — 99999 PR PBB SHADOW E&M-EST. PATIENT-LVL III: ICD-10-PCS | Mod: PBBFAC,,, | Performed by: NURSE PRACTITIONER

## 2022-04-26 PROCEDURE — 81002 URINALYSIS NONAUTO W/O SCOPE: CPT | Mod: S$GLB,,, | Performed by: NURSE PRACTITIONER

## 2022-04-26 PROCEDURE — 1159F MED LIST DOCD IN RCRD: CPT | Mod: CPTII,S$GLB,, | Performed by: NURSE PRACTITIONER

## 2022-04-26 PROCEDURE — 1159F PR MEDICATION LIST DOCUMENTED IN MEDICAL RECORD: ICD-10-PCS | Mod: CPTII,S$GLB,, | Performed by: NURSE PRACTITIONER

## 2022-04-26 PROCEDURE — 99214 OFFICE O/P EST MOD 30 MIN: CPT | Mod: S$GLB,,, | Performed by: NURSE PRACTITIONER

## 2022-04-26 PROCEDURE — 81002 POCT URINE DIPSTICK WITHOUT MICROSCOPE: ICD-10-PCS | Mod: S$GLB,,, | Performed by: NURSE PRACTITIONER

## 2022-04-26 PROCEDURE — 3008F PR BODY MASS INDEX (BMI) DOCUMENTED: ICD-10-PCS | Mod: CPTII,S$GLB,, | Performed by: NURSE PRACTITIONER

## 2022-04-26 PROCEDURE — 3077F SYST BP >= 140 MM HG: CPT | Mod: CPTII,S$GLB,, | Performed by: NURSE PRACTITIONER

## 2022-04-26 PROCEDURE — 3008F BODY MASS INDEX DOCD: CPT | Mod: CPTII,S$GLB,, | Performed by: NURSE PRACTITIONER

## 2022-04-26 PROCEDURE — 3077F PR MOST RECENT SYSTOLIC BLOOD PRESSURE >= 140 MM HG: ICD-10-PCS | Mod: CPTII,S$GLB,, | Performed by: NURSE PRACTITIONER

## 2022-04-26 PROCEDURE — 1160F RVW MEDS BY RX/DR IN RCRD: CPT | Mod: CPTII,S$GLB,, | Performed by: NURSE PRACTITIONER

## 2022-04-26 PROCEDURE — 3079F DIAST BP 80-89 MM HG: CPT | Mod: CPTII,S$GLB,, | Performed by: NURSE PRACTITIONER

## 2022-04-26 PROCEDURE — 51798 POCT BLADDER SCAN: ICD-10-PCS | Mod: S$GLB,,, | Performed by: NURSE PRACTITIONER

## 2022-04-26 NOTE — PROGRESS NOTES
CHIEF COMPLAINT:  Dysuria     HISTORY OF PRESENTING ILLINESS:  Nilton Chin is a 62 y.o. male established patient of Dr. Lawrence, last seen in clinic 11/19/20. Presents to urology today for dysuria and urgency began 7 days ago, has been taking OTC AZO for dysuria which has been helpful.     Hx of prostate cancer.   Robotic assisted laparoscopic prostatectomy 11/25/2019- [id number], [place] 3+4.  Continent and pad free. Doing Kegels. Continent and pad free since week 1.  Stage-T2  PSA 4.4  Volume- 16 ccs (MRI)  MRI- 3.2 cm RPZ PI-RADS 5 lesion. Negative extra prostatic extension, NVB, Bladder, seminal vesicle, nodes.  Biopsy- 10/4 [place]ises 8 right middle 2/2 60%; [place] 3+4 left apex 2/2 50%, right base 1/1 60%, right apex 2/2 60%; [place] 6 2/2 50%.  ADELE score- 5  NCCN- high risk  Received radiation treatment with Dr. Tesfaye 1/2021-4/2021 in conjunction with hormonal deprivation therapy.      REVIEW OF SYSTEMS:  Review of Systems   Constitutional: Negative for chills and fever.   HENT: Negative for congestion and sore throat.    Respiratory: Negative for cough and shortness of breath.    Cardiovascular: Negative for chest pain and palpitations.   Genitourinary: Positive for dysuria and urgency. Negative for flank pain, frequency and hematuria.   Neurological: Negative for dizziness and headaches.         PATIENT HISTORY:    Past Medical History:   Diagnosis Date    Allergy     History of prostatitis     Hypertension        Past Surgical History:   Procedure Laterality Date    COLONOSCOPY      PROSTATE BIOPSY      ROBOT-ASSISTED LAPAROSCOPIC PROSTATECTOMY USING DA EMELY XI N/A 11/25/2019    Procedure: XI ROBOTIC PROSTATECTOMY;  Surgeon: Jose Carlos Lawrence MD;  Location: [facility] OR [address] [place] [address];  Service: Urology;  Laterality: N/A;  3hrs gen with regional        Family History   Problem Relation Age of Onset    Cataracts Mother     Hypertension Mother     Cataracts Father     Glaucoma Father     Hypertension  Father     Blindness Other     Cancer Paternal Grandfather         lung/prostate    Diabetes Paternal Grandfather     Diabetes Maternal Grandmother     Heart disease Maternal Grandmother     Amblyopia Neg Hx     Strabismus Neg Hx     Retinal detachment Neg Hx     Macular degeneration Neg Hx     Stroke Neg Hx     Melanoma Neg Hx        Social History     Socioeconomic History    Marital status:     Number of children: 3   Occupational History    Occupation:    Tobacco Use    Smoking status: Former Smoker     Years: 35.00     Types: Cigarettes     Start date: 6/24/2013    Smokeless tobacco: Never Used    Tobacco comment: 6 cig a day   Substance and Sexual Activity    Alcohol use: Yes     Comment: socially/ not weekly    Drug use: No    Sexual activity: Yes     Partners: Female       Allergies:  Codeine and Vicodin [hydrocodone-acetaminophen]    Medications:    Current Outpatient Medications:     cetirizine (ZYRTEC) 10 MG tablet, Take 10 mg by mouth once daily., Disp: , Rfl:     fluticasone propionate (FLONASE) 50 mcg/actuation nasal spray, 2 sprays (100 mcg total) by Each Nostril route once daily., Disp: 9.9 mL, Rfl: 11    losartan-hydrochlorothiazide 50-12.5 mg (HYZAAR) 50-12.5 mg per tablet, Take 1 tablet by mouth once daily., Disp: 90 tablet, Rfl: 3    meloxicam (MOBIC) 15 MG tablet, TK 1 T PO  ONCE D, Disp: , Rfl:     omeprazole (PRILOSEC) 40 MG capsule, Take 1 capsule (40 mg total) by mouth every morning., Disp: 30 capsule, Rfl: 3    sildenafiL (VIAGRA) 100 MG tablet, Take 1 tablet (100 mg total) by mouth daily as needed for Erectile Dysfunction., Disp: 10 tablet, Rfl: 6    tadalafiL (CIALIS) 20 MG Tab, 1 PO QOD, Disp: 30 tablet, Rfl: 11    Current Facility-Administered Medications:     sars-cov-2 (covid-19) 100 mcg/0.5 ml injection 0.5 mL, 0.5 mL, Intramuscular, 1 time in Clinic/HOD, Renetta Baig MA    PHYSICAL EXAMINATION:  Physical Exam  Constitutional:        Appearance: Normal appearance.   HENT:      Head: Normocephalic.      Right Ear: External ear normal.      Left Ear: External ear normal.   Pulmonary:      Effort: Pulmonary effort is normal. No respiratory distress.   Genitourinary:     Penis: Normal.       Testes: Normal.   Musculoskeletal:         General: Normal range of motion.   Skin:     General: Skin is warm and dry.   Neurological:      General: No focal deficit present.      Mental Status: He is alert and oriented to person, place, and time.   Psychiatric:         Mood and Affect: Mood normal.         Behavior: Behavior normal.           LABS:      In office UA today was clear.  PVR 11ml       Lab Results   Component Value Date    PSA 0.16 08/14/2020    PSA 4.4 (H) 08/14/2019    PSA 3.5 08/08/2018    PSADIAG <0.01 12/15/2021    PSADIAG <0.01 07/16/2021    PSADIAG 0.20 11/18/2020             IMPRESSION:    Encounter Diagnoses   Name Primary?    Dysuria Yes    Urgency of urination     Prostate cancer          Assessment:       1. Dysuria    2. Urgency of urination    3. Prostate cancer        Plan:   1. Dysuria and urgency   - Urine culture, mycoplasma urine and ureaplasma urine sent today.  - Pelvic floor PT discussed - pt would like to wait for urine results before beginning therapy but is open to PT.   - Continue OTC AZO PRN     3. Prostate cancer  - Follow up visit and PSA 6/23/22 with Dr. Lawrence     I spent 30 minutes with the patient of which more than half was spent in direct consultation with the patient in regards to our treatment and plan.  We addressed the office findings and recent labs.   Education and recommendations of today's plan of care including home remedies and needed follow up with PCP.

## 2022-04-27 LAB
BACTERIA UR CULT: NORMAL
M GENITALIUM DNA UR QL NAA+PROBE: NEGATIVE
SPECIMEN SOURCE: NORMAL

## 2022-04-28 LAB
SPECIMEN SOURCE: NORMAL
U PARVUM DNA SPEC QL NAA+PROBE: NEGATIVE
U UREALYTICUM DNA SPEC QL NAA+PROBE: NEGATIVE

## 2022-05-17 ENCOUNTER — TELEPHONE (OUTPATIENT)
Dept: INTERNAL MEDICINE | Facility: CLINIC | Age: 62
End: 2022-05-17
Payer: COMMERCIAL

## 2022-05-17 DIAGNOSIS — I10 ESSENTIAL HYPERTENSION: Primary | ICD-10-CM

## 2022-05-17 DIAGNOSIS — Z00.00 ANNUAL PHYSICAL EXAM: ICD-10-CM

## 2022-05-17 NOTE — TELEPHONE ENCOUNTER
----- Message from Sarah Hong sent at 5/17/2022  9:05 AM CDT -----  Good morning,    Patient has been scheduled for his annual visit on 8/26/2022 and is also scheduled for his labs a week before on 8/19/2022. Annual labs will need to be linked for 8/19/2022. Thanks in advance.

## 2022-06-20 ENCOUNTER — LAB VISIT (OUTPATIENT)
Dept: LAB | Facility: HOSPITAL | Age: 62
End: 2022-06-20
Attending: RADIOLOGY
Payer: COMMERCIAL

## 2022-06-20 DIAGNOSIS — C61 PROSTATE CANCER: ICD-10-CM

## 2022-06-20 LAB — COMPLEXED PSA SERPL-MCNC: <0.01 NG/ML (ref 0–4)

## 2022-06-20 PROCEDURE — 84153 ASSAY OF PSA TOTAL: CPT | Performed by: RADIOLOGY

## 2022-06-20 PROCEDURE — 36415 COLL VENOUS BLD VENIPUNCTURE: CPT | Mod: PO | Performed by: RADIOLOGY

## 2022-06-22 NOTE — PROGRESS NOTES
Clinic Note  6/22/2022      Subjective:         Chief Complaint:   HPI  Nilton Chin is a 62 y.o. male diagnosed with prostate cancer. Consult from Dr. Lopez.  Here with wife Maisha and daughter.  Negative family history.  [place] [employer]  robotic assisted laparoscopic prostatectomy 11/25/2019- [id number], [place] 3+4.  Continent and pad free. Doing Kegels. Continent and pad free since week 1.  Used no opioids used post op.     Stage-T2  PSA 4.4  Volume- 16 ccs (MRI)  MRI- 3.2 cm RPZ PI-RADS 5 lesion. Negative extra prostatic extension, NVB, Bladder, seminal vesicle, nodes.  Biopsy- 10/4 [place] 8 right middle 2/2 60%; [place] 3+4 left apex 2/2 50%, right base 1/1 60%, right apex 2/2 60%; [place] 6 2/2 50%.  ADELE score- 5  NCCN- high risk     6/23/2022- completed salvage EBRT 4/21/2021. PSA <0.01.  Reports urinary urgency. Denies hematuria, dysuria. Good stream. Requesting pelvic floor PT.      Lab Results   Component Value Date    PSA 0.16 08/14/2020    PSA 4.4 (H) 08/14/2019    PSA 3.5 08/08/2018    PSA 3.3 08/08/2017    PSA 2.3 06/02/2016    PSA 0.80 11/05/2012    PSA 2.26 09/27/2012    PSA 1.1 07/15/2011    PSA 0.95 10/29/2009    PSA 0.74 10/08/2008    PSADIAG <0.01 06/20/2022    PSADIAG <0.01 12/15/2021    PSADIAG <0.01 07/16/2021    PSADIAG 0.20 11/18/2020    PSADIAG 0.14 07/14/2020    PSADIAG 0.02 01/14/2020    PSADIAG 3.0 11/07/2018    PSADIAG 2.8 11/01/2017    PSADIAG 2.3 10/26/2016    PSADIAG 2.2 09/17/2015      Past Medical History:   Diagnosis Date    Allergy     History of prostatitis     Hypertension      Family History   Problem Relation Age of Onset    Cataracts Mother     Hypertension Mother     Cataracts Father     Glaucoma Father     Hypertension Father     Blindness Other     Cancer Paternal Grandfather         lung/prostate    Diabetes Paternal Grandfather     Diabetes Maternal Grandmother     Heart disease Maternal Grandmother     Amblyopia Neg Hx      "Strabismus Neg Hx     Retinal detachment Neg Hx     Macular degeneration Neg Hx     Stroke Neg Hx     Melanoma Neg Hx      Social History     Socioeconomic History    Marital status:     Number of children: 3   Occupational History    Occupation:    Tobacco Use    Smoking status: Former Smoker     Years: 35.00     Types: Cigarettes     Start date: 6/24/2013    Smokeless tobacco: Never Used    Tobacco comment: 6 cig a day   Substance and Sexual Activity    Alcohol use: Yes     Comment: socially/ not weekly    Drug use: No    Sexual activity: Yes     Partners: Female     Past Surgical History:   Procedure Laterality Date    COLONOSCOPY      PROSTATE BIOPSY      ROBOT-ASSISTED LAPAROSCOPIC PROSTATECTOMY USING DA EMELY XI N/A 11/25/2019    Procedure: XI ROBOTIC PROSTATECTOMY;  Surgeon: Jose Carlos Lawrence MD;  Location: [facility] [address] [place] [address];  Service: Urology;  Laterality: N/A;  3hrs gen with regional      Patient Active Problem List   Diagnosis    Cervical radiculopathy    Hypertension    Cervical neuropathy    Iliotibial band syndrome affecting lower leg    Anal fissure    Other hyperlipidemia    Prostate cancer    Erectile dysfunction following radical prostatectomy    Dysuria    Urgency of urination     Review of Systems   Constitutional: Negative for activity change, appetite change, chills, fatigue, fever and unexpected weight change.   Respiratory: Negative for shortness of breath.    Cardiovascular: Negative for chest pain.   Gastrointestinal: Negative for abdominal pain.   Genitourinary: Positive for urgency. Negative for difficulty urinating, dysuria, flank pain and hematuria.   Skin: Negative for wound.   Neurological: Negative for weakness.   Psychiatric/Behavioral: Negative for confusion.         Objective:      There were no vitals taken for this visit.  Estimated body mass index is 26.1 kg/m² as calculated from the following:    Height as of 12/20/21: 5' 7" (1.702 " m).    Weight as of 4/26/22: 75.6 kg (166 lb 10.7 oz).  Physical Exam  Constitutional:       General: He is not in acute distress.     Appearance: He is not diaphoretic.   HENT:      Head: Normocephalic and atraumatic.      Nose: Nose normal.   Eyes:      Conjunctiva/sclera: Conjunctivae normal.   Cardiovascular:      Rate and Rhythm: Normal rate.   Pulmonary:      Effort: Pulmonary effort is normal. No respiratory distress.   Abdominal:      General: There is no distension.   Musculoskeletal:         General: Normal range of motion.      Cervical back: Normal range of motion.   Skin:     General: Skin is dry.   Neurological:      Mental Status: He is alert.   Psychiatric:         Behavior: Behavior normal.         Thought Content: Thought content normal.           Assessment and Plan:           Problem List Items Addressed This Visit     Prostate cancer - Primary          Follow up:   Referral for pelvic floor PT  Continue follow up with Dr. Tesfaye in 6 months  RTC 1 year    Jose Carlos Lawrence

## 2022-06-23 ENCOUNTER — OFFICE VISIT (OUTPATIENT)
Dept: UROLOGY | Facility: CLINIC | Age: 62
End: 2022-06-23
Payer: COMMERCIAL

## 2022-06-23 VITALS
BODY MASS INDEX: 26.21 KG/M2 | HEIGHT: 67 IN | WEIGHT: 167 LBS | SYSTOLIC BLOOD PRESSURE: 142 MMHG | DIASTOLIC BLOOD PRESSURE: 87 MMHG | HEART RATE: 73 BPM

## 2022-06-23 DIAGNOSIS — C61 PROSTATE CANCER: Primary | ICD-10-CM

## 2022-06-23 DIAGNOSIS — R39.15 URGENCY OF URINATION: ICD-10-CM

## 2022-06-23 PROCEDURE — 3077F SYST BP >= 140 MM HG: CPT | Mod: CPTII,S$GLB,, | Performed by: UROLOGY

## 2022-06-23 PROCEDURE — 99213 OFFICE O/P EST LOW 20 MIN: CPT | Mod: S$GLB,,, | Performed by: UROLOGY

## 2022-06-23 PROCEDURE — 1160F PR REVIEW ALL MEDS BY PRESCRIBER/CLIN PHARMACIST DOCUMENTED: ICD-10-PCS | Mod: CPTII,S$GLB,, | Performed by: UROLOGY

## 2022-06-23 PROCEDURE — 99999 PR PBB SHADOW E&M-EST. PATIENT-LVL IV: ICD-10-PCS | Mod: PBBFAC,,, | Performed by: UROLOGY

## 2022-06-23 PROCEDURE — 3008F PR BODY MASS INDEX (BMI) DOCUMENTED: ICD-10-PCS | Mod: CPTII,S$GLB,, | Performed by: UROLOGY

## 2022-06-23 PROCEDURE — 1159F MED LIST DOCD IN RCRD: CPT | Mod: CPTII,S$GLB,, | Performed by: UROLOGY

## 2022-06-23 PROCEDURE — 99213 PR OFFICE/OUTPT VISIT, EST, LEVL III, 20-29 MIN: ICD-10-PCS | Mod: S$GLB,,, | Performed by: UROLOGY

## 2022-06-23 PROCEDURE — 3077F PR MOST RECENT SYSTOLIC BLOOD PRESSURE >= 140 MM HG: ICD-10-PCS | Mod: CPTII,S$GLB,, | Performed by: UROLOGY

## 2022-06-23 PROCEDURE — 99999 PR PBB SHADOW E&M-EST. PATIENT-LVL IV: CPT | Mod: PBBFAC,,, | Performed by: UROLOGY

## 2022-06-23 PROCEDURE — 3079F DIAST BP 80-89 MM HG: CPT | Mod: CPTII,S$GLB,, | Performed by: UROLOGY

## 2022-06-23 PROCEDURE — 3008F BODY MASS INDEX DOCD: CPT | Mod: CPTII,S$GLB,, | Performed by: UROLOGY

## 2022-06-23 PROCEDURE — 1159F PR MEDICATION LIST DOCUMENTED IN MEDICAL RECORD: ICD-10-PCS | Mod: CPTII,S$GLB,, | Performed by: UROLOGY

## 2022-06-23 PROCEDURE — 3079F PR MOST RECENT DIASTOLIC BLOOD PRESSURE 80-89 MM HG: ICD-10-PCS | Mod: CPTII,S$GLB,, | Performed by: UROLOGY

## 2022-06-23 PROCEDURE — 1160F RVW MEDS BY RX/DR IN RCRD: CPT | Mod: CPTII,S$GLB,, | Performed by: UROLOGY

## 2022-08-09 ENCOUNTER — CLINICAL SUPPORT (OUTPATIENT)
Dept: REHABILITATION | Facility: HOSPITAL | Age: 62
End: 2022-08-09
Payer: COMMERCIAL

## 2022-08-09 DIAGNOSIS — R39.15 URGENCY OF URINATION: ICD-10-CM

## 2022-08-09 PROCEDURE — 97112 NEUROMUSCULAR REEDUCATION: CPT

## 2022-08-09 PROCEDURE — 97162 PT EVAL MOD COMPLEX 30 MIN: CPT

## 2022-08-09 NOTE — PROGRESS NOTES
[facility]  Pelvic Health Physical Therapy Initial Evaluation    Date: 8/9/2022   Name: Nilton Chin  Clinic Number: [id number]  Therapy Diagnosis:   Encounter Diagnosis   Name Primary?    Urgency of urination      Physician: Walker Schmid MD    Physician Orders: PT Eval and Treat    Medical Diagnosis from Referral: Urgency of urination [R39.15]  Evaluation Date: 8/9/2022  Authorization Period Expiration: 6/23/2023  Plan of Care Expiration: 11/9/2022  Visit # / Visits authorized: 1/ 1    Time In: 9:04  Time Out: 10:05  Total Appointment Time (timed & untimed codes): 60 minutes    Precautions: universal    Subjective     Date of onset: April of 2022- feels like he may have strained himself moving building materials in his yard.     History of current condition - Nilton reports: that his urinary frequency started before his cancer diagnosis.  He was having dysuria (burning at the tip of his penis).  He takes BP medication in the morning.      Bladder/Bowel History: urinary urgency and frequency; RALP in 2019 with EBRT completed in 4/2021.     Frequency of urination:   Daytime: every hour to 2 hours           Nighttime: at least once   Difficulty initiating urine stream: No   Urine stream: strong   Complete emptying: Yes   Bladder leakage: No- very minimal leakage after RALP   Urinary Urgency: Yes, Able to delay the urge for at least 20-30 minutes.   Frequency of bowel movements: 1-2 times a day   Difficulty initiating BM: No   Quality/Shape of BM: [place] Stool Chart 4   Does Patient Feel Empty after BM? Yes   Fiber Supplements or Laxative Use? No   Colon leakage: No   Form of protection: none    Pain:  None now; has had rectal pain and testicular pain in the past, also penile pain.       Medical History: Nilton  has a past medical history of Allergy, History of prostatitis, and Hypertension.     Surgical History: Nilton Chin  has a past surgical history that includes Prostate  biopsy; Robot-assisted laparoscopic prostatectomy using da Luis Xi (N/A, 11/25/2019); and Colonoscopy.    Medications: Nilton has a current medication list which includes the following prescription(s): cetirizine, fluticasone propionate, losartan-hydrochlorothiazide 50-12.5 mg, meloxicam, omeprazole, sildenafil, and tadalafil, and the following Facility-Administered Medications: sars-cov-2 (covid-19).    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  Prior Level of Function: could void at an acceptable frequency  Current Level of Function: now having urinary frequency    Types of fluid intake: water and coffee (1 cup); Raina tea  Diet: good fiber diet;   Habitus: well developed, well nourished  Abuse/Neglect: No     Pts goals: to be able to put off voiding more easily    OBJECTIVE     See EMR under MEDIA for written consent provided 8/9/2022  Chaperone: declined    ORTHO SCREEN  Posture in sitting: WNL  Posture in standing: WNL  Pelvic alignment: no sign of deviations noted in supine     ABDOMINAL WALL ASSESSMENT  Abdominal strength: Rectus abdominus: 3/5     Transverse abdominus: weak but palpable contraction  Scarring: portal scars healed  Diastasis: present (bulge with doming above umbilicus)     BREATHING MECHANICS ASSESSMENT   Thorax Assessment During Quiet Respiration: Decreased excursion of abdominal wall   Thorax Assessment During Deep Respiration: Decreased excursion of abdominal wall       RECTAL PELVIC FLOOR EXAM    EXTERNAL ASSESSMENT  Anus: WNL  Skin condition: skin tag noted   Scarring: none  Sensation: WNL   Pain: none  Voluntary contraction: visible lift  Voluntary relaxation: visible  drop  Involuntary contraction: visible lift  Bearing down: bulge  Anal [place]: intact  Discharge: none       INTERNAL ASSESSMENT  EAS tone: hypertonic   Impaction: none   Pain: none  Sensation: able to localize pressure appropriately   Muscle Bulk: hypertonus   Muscle Power: 2+/5  Muscle Endurance: 8 sec       Quality of contraction: incomplete relaxation   Specificity: WNL  Coordination: tends to hold breath during PFM contraction     TREATMENT     Treatment Time In: 9:55  Treatment Time Out: 10:05  Total Treatment time (time-based codes) separate from Evaluation: 10 minutes    Neuromuscular Re-education to develop Down training for 10 minutes including:   diaphragmatic breathing and pelvic floor check ins- also child's pose and adapted child pose    Patient Education provided:   general anatomy/physiology of urinary/ bowel  system, benefits of treatment, risks of treatment and alternative methods of treatment were discussed with the pt. Additionally, bladder irritants, anatomy/physiology of pelvic floor and posture/body mechanices were reviewed.     Home Exercises provided:  Written Home Exercises provided: yes.  Exercises were reviewed and Nilton was able to demonstrate them prior to the end of the session.    Nilton demonstrated good  understanding of the education provided.     See EMR under Patient Instructions for exercises provided 8/9/2022.    Assessment     Nilton is a 62 y.o. male referred to outpatient Physical Therapy with a medical diagnosis of Urgency of urination [R39.15]. Pt presents with poor knowledge of body mechanics and posture, poor trunk stability, decreased pelvic muscle strength, increased tension of the pelvic muscles, poor quality of pelvic muscle contraction, increased frequency of urination and increased nocturia.       Pt prognosis is Good.   Pt will benefit from skilled outpatient Physical Therapy to address the deficits stated above and in the chart below, provide pt/family education, and  to maximize pt's level of independence.     Plan of care discussed with patient: Yes  Pt's spiritual, cultural and educational needs considered and patient is agreeable to the plan of care and goals as stated below:     Anticipated Barriers for therapy: none    Medical Necessity is demonstrated by the following:    History  Co-morbidities and personal factors that may impact the plan of care Co-morbidities   prostate CA s/p RALP; XRT    Personal Factors  no deficits     moderate   Examination  Body structures and functions, activity limitations and participation restrictions that may impact the plan of care Body Regions/Systems/Functions:  poor knowledge of body mechanics and posture, poor trunk stability, decreased pelvic muscle strength, increased tension of the pelvic muscles, poor quality of pelvic muscle contraction, increased frequency of urination and increased nocturia     Activity Limitations:  urgency , delaying urge to urinate and bathroom mapping    Participation Restrictions:  all ADLs/iADLs uninterrupted by urinary incontinence/urgency/frequency    Activity limitations:   Learning and applying knowledge  no deficits    General Tasks and Commands  no deficits    Communication  no deficits    Mobility  no deficits    Self care  no deficits    Domestic Life  no deficits    Interactions/Relationships  no deficits    Life Areas  no deficits    Community and Social Life  no deficits       moderate   Clinical Presentation unstable clinical presentation with unpredictable characteristics high   Decision Making/ Complexity Score: moderate       Goals:  Long Term Goals: 12 weeks   Pt will verbalize improved awareness of PFM activity as palpated by PT in order to improve activity involvement with HEP.  Pt will report improved ability to manage bladder spasms appropriately 100% of the time for an improvement in urinary frequency/urgency.   Pt will report improved ability to sleep uninterrupted by excessive  nocturia 5/7 days per week.   Pt/family will be independent with HEP for continued self-management of symptoms.    Plan     Plan of care Certification: 8/9/2022 to 11/9/2022.    Outpatient Physical Therapy 1 times per 2 week(s)  for 12 weeks to include the following interventions: therapeutic exercises, neuromuscular re-education, manual therapy, modalities PRN, patient/family education and self care/home management    Kourtney Soto, PT, BCB-PMD

## 2022-08-09 NOTE — PATIENT INSTRUCTIONS
"Home Exercise Program: 08/09/2022    360 Breathing Technique          Inhale long, slow and deep. You should feel as if your lower ribs are expanding in all directions like the way an umbrella opens. You should feel the belly, back and sides gently expand and you may notice a relaxation in the pelvic floor. Then breathe out and everything "snaps back."    Continue to breathe like this for 5 minutes. Repeat 2 times/day.      Home Exercise Program: 08/09/2022    YOGA POSES   to improve pelvic floor muscle DROP.  Maintain position for 1 minute, 1-2 times per day.              Child's Pose     You can do the above at your desk at work- lean forward onto the desk as shown in clinic.  Belly breathe in that position.      Reconsider the coffee, especially after taking your BP pill.        "CHECK IN" WITH YOUR PELVIC FLOOR    Every hour, "check in" with your pelvic floor.  Is it tight and contracted?  Is it relaxed and dropped?    Take 10 seconds and try to release any tension in the pelvic floor muscles and external anal sphincter. You can try one or more of the following techniques.   Diaphragmatic Breathing  Mindfulness  Body scan   Gently pushing out your pelvic floor     Then return to your regular tasks.    Do this every hour throughout the day in any position.    "

## 2022-08-16 ENCOUNTER — PATIENT MESSAGE (OUTPATIENT)
Dept: OPTOMETRY | Facility: CLINIC | Age: 62
End: 2022-08-16
Payer: COMMERCIAL

## 2022-08-16 DIAGNOSIS — H04.123 DRY EYES, BILATERAL: Primary | ICD-10-CM

## 2022-08-18 ENCOUNTER — PATIENT MESSAGE (OUTPATIENT)
Dept: OPTOMETRY | Facility: CLINIC | Age: 62
End: 2022-08-18
Payer: COMMERCIAL

## 2022-08-18 RX ORDER — FLUOROMETHOLONE 1 MG/ML
1 SUSPENSION/ DROPS OPHTHALMIC 4 TIMES DAILY
Qty: 5 ML | Refills: 1 | Status: SHIPPED | OUTPATIENT
Start: 2022-08-18 | End: 2022-08-28

## 2022-08-19 ENCOUNTER — LAB VISIT (OUTPATIENT)
Dept: LAB | Facility: HOSPITAL | Age: 62
End: 2022-08-19
Attending: INTERNAL MEDICINE
Payer: COMMERCIAL

## 2022-08-19 DIAGNOSIS — I10 ESSENTIAL HYPERTENSION: ICD-10-CM

## 2022-08-19 DIAGNOSIS — Z00.00 ANNUAL PHYSICAL EXAM: ICD-10-CM

## 2022-08-19 LAB
ALBUMIN SERPL BCP-MCNC: 4.4 G/DL (ref 3.5–5.2)
ALP SERPL-CCNC: 69 U/L (ref 55–135)
ALT SERPL W/O P-5'-P-CCNC: 27 U/L (ref 10–44)
ANION GAP SERPL CALC-SCNC: 8 MMOL/L (ref 8–16)
AST SERPL-CCNC: 21 U/L (ref 10–40)
BASOPHILS # BLD AUTO: 0.03 K/UL (ref 0–0.2)
BASOPHILS NFR BLD: 0.8 % (ref 0–1.9)
BILIRUB SERPL-MCNC: 1.4 MG/DL (ref 0.1–1)
BUN SERPL-MCNC: 17 MG/DL (ref 8–23)
CALCIUM SERPL-MCNC: 9.6 MG/DL (ref 8.7–10.5)
CHLORIDE SERPL-SCNC: 104 MMOL/L (ref 95–110)
CHOLEST SERPL-MCNC: 244 MG/DL (ref 120–199)
CHOLEST/HDLC SERPL: 4.5 {RATIO} (ref 2–5)
CO2 SERPL-SCNC: 29 MMOL/L (ref 23–29)
CREAT SERPL-MCNC: 1.2 MG/DL (ref 0.5–1.4)
DIFFERENTIAL METHOD: ABNORMAL
EOSINOPHIL # BLD AUTO: 0.1 K/UL (ref 0–0.5)
EOSINOPHIL NFR BLD: 2.3 % (ref 0–8)
ERYTHROCYTE [DISTWIDTH] IN BLOOD BY AUTOMATED COUNT: 12.3 % (ref 11.5–14.5)
EST. GFR  (NO RACE VARIABLE): >60 ML/MIN/1.73 M^2
GLUCOSE SERPL-MCNC: 100 MG/DL (ref 70–110)
HCT VFR BLD AUTO: 41.8 % (ref 40–54)
HDLC SERPL-MCNC: 54 MG/DL (ref 40–75)
HDLC SERPL: 22.1 % (ref 20–50)
HGB BLD-MCNC: 13.3 G/DL (ref 14–18)
IMM GRANULOCYTES # BLD AUTO: 0.01 K/UL (ref 0–0.04)
IMM GRANULOCYTES NFR BLD AUTO: 0.3 % (ref 0–0.5)
LDLC SERPL CALC-MCNC: 169.2 MG/DL (ref 63–159)
LYMPHOCYTES # BLD AUTO: 0.9 K/UL (ref 1–4.8)
LYMPHOCYTES NFR BLD: 22 % (ref 18–48)
MCH RBC QN AUTO: 31.2 PG (ref 27–31)
MCHC RBC AUTO-ENTMCNC: 31.8 G/DL (ref 32–36)
MCV RBC AUTO: 98 FL (ref 82–98)
MONOCYTES # BLD AUTO: 0.4 K/UL (ref 0.3–1)
MONOCYTES NFR BLD: 9.1 % (ref 4–15)
NEUTROPHILS # BLD AUTO: 2.5 K/UL (ref 1.8–7.7)
NEUTROPHILS NFR BLD: 65.5 % (ref 38–73)
NONHDLC SERPL-MCNC: 190 MG/DL
NRBC BLD-RTO: 0 /100 WBC
PLATELET # BLD AUTO: 191 K/UL (ref 150–450)
PMV BLD AUTO: 12.7 FL (ref 9.2–12.9)
POTASSIUM SERPL-SCNC: 4.3 MMOL/L (ref 3.5–5.1)
PROT SERPL-MCNC: 7 G/DL (ref 6–8.4)
RBC # BLD AUTO: 4.26 M/UL (ref 4.6–6.2)
SODIUM SERPL-SCNC: 141 MMOL/L (ref 136–145)
TRIGL SERPL-MCNC: 104 MG/DL (ref 30–150)
TSH SERPL DL<=0.005 MIU/L-ACNC: 2.87 UIU/ML (ref 0.4–4)
WBC # BLD AUTO: 3.86 K/UL (ref 3.9–12.7)

## 2022-08-19 PROCEDURE — 84443 ASSAY THYROID STIM HORMONE: CPT | Performed by: INTERNAL MEDICINE

## 2022-08-19 PROCEDURE — 80061 LIPID PANEL: CPT | Performed by: INTERNAL MEDICINE

## 2022-08-19 PROCEDURE — 85025 COMPLETE CBC W/AUTO DIFF WBC: CPT | Performed by: INTERNAL MEDICINE

## 2022-08-19 PROCEDURE — 36415 COLL VENOUS BLD VENIPUNCTURE: CPT | Mod: PO | Performed by: INTERNAL MEDICINE

## 2022-08-19 PROCEDURE — 80053 COMPREHEN METABOLIC PANEL: CPT | Performed by: INTERNAL MEDICINE

## 2022-08-24 ENCOUNTER — CLINICAL SUPPORT (OUTPATIENT)
Dept: REHABILITATION | Facility: HOSPITAL | Age: 62
End: 2022-08-24
Attending: STUDENT IN AN ORGANIZED HEALTH CARE EDUCATION/TRAINING PROGRAM
Payer: COMMERCIAL

## 2022-08-24 DIAGNOSIS — M62.89 PELVIC FLOOR DYSFUNCTION: ICD-10-CM

## 2022-08-24 PROCEDURE — 97112 NEUROMUSCULAR REEDUCATION: CPT | Mod: PO

## 2022-08-24 NOTE — PROGRESS NOTES
Pelvic Health Physical Therapy   Treatment Note     Name: Nilton Chin  Clinic Number: [id number]    Therapy Diagnosis:   Encounter Diagnosis   Name Primary?    Pelvic floor dysfunction      Physician: Walker Schmid MD    Visit Date: 8/24/2022    Physician Orders: PT Eval and Treat    Medical Diagnosis from Referral: Urgency of urination [R39.15]  Evaluation Date: 8/9/2022  Authorization Period Expiration: 6/23/2023  Plan of Care Expiration: 11/9/2022  Visit # / Visits authorized: 2/1  Cancelled Visits: 0  No Show Visits: 0    Time In: 3:15  Time Out: 4:00  Total Billable Time: 45 minutes    Precautions: Standard    Subjective     Pt reports: that he has been doing his exercise and breathing techniques.  He also noted that he forgot to take his fluid pill this morning and had much more manageable urinary frequency.    He was compliant with home exercise program.  Response to previous treatment: no adverse effects  Functional change: none noted yet    Pain: 0/10    Objective     Nilton participated in neuromuscular re-education activities to develop Coordination and Down training for 45 minutes including: Kegels with assist of SEMG and diaphragmatic breathing  We worked in supine and sitting with external lead wires.  He demonstrated elevated baseline resting initially.  He was able to demonstrate DB well but this did not lower his resting level.  In 10 sec Kegels his WR rise was fair, holding fair, and derecruitment delayed and inconsistent.  We then moved to sitting, and he was instructed to watch the screen- in 10 sec Kegels in this position he demonstrated normal baseline resting, good WR rise, and fair/good holding ability.  derecruitment was timely and complete.  He was instructed to work on this at home, and double voiding techniques were reviewed per handout.      Home Exercises Provided and Patient Education Provided     Education provided:   - posture/body mechanices and diaphragmatic  breathing  Discussed progression of plan of care with patient; educated pt in activity modification; reviewed HEP with pt. Pt demonstrated and verbalized understanding of all instruction and was provided with a handout of HEP (see Patient Instructions).    Written Home Exercises Provided: yes.  Exercises were reviewed and Nilton was able to demonstrate them prior to the end of the session.  Nilton demonstrated good  understanding of the education provided.     See EMR under Patient Instructions for exercises provided 8/24/2022.    Assessment     Pt performed well with the biofeedback- will work to establish consistency and work in standing next session.      Nilton Is progressing well towards his goals.   Pt prognosis is Excellent.     Pt will continue to benefit from skilled outpatient physical therapy to address the deficits listed in the problem list box on initial evaluation, provide pt/family education and to maximize pt's level of independence in the home and community environment.     Pt's spiritual, cultural and educational needs considered and pt agreeable to plan of care and goals.     Anticipated barriers to physical therapy: none    Goals: 12 weeks   Pt will verbalize improved awareness of PFM activity as palpated by PT in order to improve activity involvement with HEP.  Pt will report improved ability to manage bladder spasms appropriately 100% of the time for an improvement in urinary frequency/urgency.   Pt will report improved ability to sleep uninterrupted by excessive nocturia 5/7 days per week.   Pt/family will be independent with HEP for continued self-management of symptoms.  ALL PROGRESSING    Plan     Plan of care Certification: 8/9/2022 to 11/9/2022.     Outpatient Physical Therapy 1 times per 2 week(s)  for 12 weeks to include the following interventions: therapeutic exercises, neuromuscular re-education, manual therapy, modalities PRN, patient/family education and self care/home  management    Kourtney Soto, PT, BCB-PMD

## 2022-08-24 NOTE — PATIENT INSTRUCTIONS
Home Program: 2022    Kegels in sittin. sit comfortably with legs and buttocks relaxed.  2. Squeeze and LIFT the muscles that stop the flow of urine and passage of gas.  Keep legs and buttock muscles quiet.  3. Hold lift for 10 seconds without holding breath.  4. Release and DROP for 10 seconds.  5. Repeat 10 times, 1 sets, 1 times per day.      No Kegels while urinating!       Double Voiding: more than one urine stream per sitting.    When your urine stream stops, perform the followin. Body Scan to be sure that your legs, buttocks, and belly are relaxed.    2. Diaphragmatic breathing (belly breathing) to re-drop the pelvic floor.  3. Bladder tapping (as shown in clinic)  4. Stand up and sit back down.      If no more urine comes in 2-3 minutes, you're done!

## 2022-08-29 ENCOUNTER — OFFICE VISIT (OUTPATIENT)
Dept: INTERNAL MEDICINE | Facility: CLINIC | Age: 62
End: 2022-08-29
Payer: COMMERCIAL

## 2022-08-29 VITALS
OXYGEN SATURATION: 98 % | TEMPERATURE: 97 F | BODY MASS INDEX: 26.75 KG/M2 | RESPIRATION RATE: 20 BRPM | DIASTOLIC BLOOD PRESSURE: 88 MMHG | HEART RATE: 69 BPM | SYSTOLIC BLOOD PRESSURE: 136 MMHG | HEIGHT: 67 IN | WEIGHT: 170.44 LBS

## 2022-08-29 DIAGNOSIS — Z00.00 ENCOUNTER FOR ANNUAL HEALTH EXAMINATION: Primary | ICD-10-CM

## 2022-08-29 DIAGNOSIS — E78.49 OTHER HYPERLIPIDEMIA: ICD-10-CM

## 2022-08-29 DIAGNOSIS — R39.15 URGENCY OF URINATION: ICD-10-CM

## 2022-08-29 DIAGNOSIS — N52.31 ERECTILE DYSFUNCTION FOLLOWING RADICAL PROSTATECTOMY: ICD-10-CM

## 2022-08-29 DIAGNOSIS — I10 PRIMARY HYPERTENSION: Chronic | ICD-10-CM

## 2022-08-29 PROBLEM — M47.894 THORACIC FACET SYNDROME: Status: ACTIVE | Noted: 2022-08-29

## 2022-08-29 PROBLEM — M50.20 DISPLACEMENT OF CERVICAL INTERVERTEBRAL DISC: Status: RESOLVED | Noted: 2022-08-29 | Resolved: 2022-08-29

## 2022-08-29 PROBLEM — M43.10 ACQUIRED SPONDYLOLISTHESIS: Status: ACTIVE | Noted: 2022-08-29

## 2022-08-29 PROBLEM — M50.20 DISPLACEMENT OF CERVICAL INTERVERTEBRAL DISC: Status: ACTIVE | Noted: 2022-08-29

## 2022-08-29 PROBLEM — M47.894 THORACIC FACET SYNDROME: Status: RESOLVED | Noted: 2022-08-29 | Resolved: 2022-08-29

## 2022-08-29 PROBLEM — M51.26 DISPLACEMENT OF LUMBAR INTERVERTEBRAL DISC WITHOUT MYELOPATHY: Status: ACTIVE | Noted: 2022-08-29

## 2022-08-29 PROBLEM — M43.10 ACQUIRED SPONDYLOLISTHESIS: Status: RESOLVED | Noted: 2022-08-29 | Resolved: 2022-08-29

## 2022-08-29 PROCEDURE — 99999 PR PBB SHADOW E&M-EST. PATIENT-LVL IV: CPT | Mod: PBBFAC,,, | Performed by: NURSE PRACTITIONER

## 2022-08-29 PROCEDURE — 3075F SYST BP GE 130 - 139MM HG: CPT | Mod: CPTII,S$GLB,, | Performed by: NURSE PRACTITIONER

## 2022-08-29 PROCEDURE — 1160F PR REVIEW ALL MEDS BY PRESCRIBER/CLIN PHARMACIST DOCUMENTED: ICD-10-PCS | Mod: CPTII,S$GLB,, | Performed by: NURSE PRACTITIONER

## 2022-08-29 PROCEDURE — 3075F PR MOST RECENT SYSTOLIC BLOOD PRESS GE 130-139MM HG: ICD-10-PCS | Mod: CPTII,S$GLB,, | Performed by: NURSE PRACTITIONER

## 2022-08-29 PROCEDURE — 99999 PR PBB SHADOW E&M-EST. PATIENT-LVL IV: ICD-10-PCS | Mod: PBBFAC,,, | Performed by: NURSE PRACTITIONER

## 2022-08-29 PROCEDURE — 1160F RVW MEDS BY RX/DR IN RCRD: CPT | Mod: CPTII,S$GLB,, | Performed by: NURSE PRACTITIONER

## 2022-08-29 PROCEDURE — 99396 PR PREVENTIVE VISIT,EST,40-64: ICD-10-PCS | Mod: S$GLB,,, | Performed by: NURSE PRACTITIONER

## 2022-08-29 PROCEDURE — 3079F PR MOST RECENT DIASTOLIC BLOOD PRESSURE 80-89 MM HG: ICD-10-PCS | Mod: CPTII,S$GLB,, | Performed by: NURSE PRACTITIONER

## 2022-08-29 PROCEDURE — 3008F BODY MASS INDEX DOCD: CPT | Mod: CPTII,S$GLB,, | Performed by: NURSE PRACTITIONER

## 2022-08-29 PROCEDURE — 1159F PR MEDICATION LIST DOCUMENTED IN MEDICAL RECORD: ICD-10-PCS | Mod: CPTII,S$GLB,, | Performed by: NURSE PRACTITIONER

## 2022-08-29 PROCEDURE — 3079F DIAST BP 80-89 MM HG: CPT | Mod: CPTII,S$GLB,, | Performed by: NURSE PRACTITIONER

## 2022-08-29 PROCEDURE — 99396 PREV VISIT EST AGE 40-64: CPT | Mod: S$GLB,,, | Performed by: NURSE PRACTITIONER

## 2022-08-29 PROCEDURE — 1159F MED LIST DOCD IN RCRD: CPT | Mod: CPTII,S$GLB,, | Performed by: NURSE PRACTITIONER

## 2022-08-29 PROCEDURE — 3008F PR BODY MASS INDEX (BMI) DOCUMENTED: ICD-10-PCS | Mod: CPTII,S$GLB,, | Performed by: NURSE PRACTITIONER

## 2022-08-29 RX ORDER — LOSARTAN POTASSIUM AND HYDROCHLOROTHIAZIDE 12.5; 5 MG/1; MG/1
1 TABLET ORAL DAILY
Qty: 90 TABLET | Refills: 3 | Status: SHIPPED | OUTPATIENT
Start: 2022-08-29 | End: 2023-09-11

## 2022-08-29 RX ORDER — TADALAFIL 20 MG/1
1 TABLET ORAL EVERY OTHER DAY
COMMUNITY
End: 2022-08-29

## 2022-08-29 NOTE — PROGRESS NOTES
Subjective:       Patient ID: Nilton Chin is a 62 y.o. male.    Chief Complaint: Annual Wellness Visit      Nilton Chin is a 62 y.o. male who presents today for an annual wellness visit.     He voices no concerns or complaints.     Review of patient's allergies indicates:   Allergen Reactions    Codeine Nausea Only    Vicodin [hydrocodone-acetaminophen]      unknown      Medication List with Changes/Refills   Current Medications    CETIRIZINE (ZYRTEC) 10 MG TABLET    Take 10 mg by mouth once daily.    FLUTICASONE PROPIONATE (FLONASE) 50 MCG/ACTUATION NASAL SPRAY    2 sprays (100 mcg total) by Each Nostril route once daily.    OMEPRAZOLE (PRILOSEC) 40 MG CAPSULE    Take 1 capsule (40 mg total) by mouth every morning.    SILDENAFIL (VIAGRA) 100 MG TABLET    Take 1 tablet (100 mg total) by mouth daily as needed for Erectile Dysfunction.   Changed and/or Refilled Medications    Modified Medication Previous Medication    LOSARTAN-HYDROCHLOROTHIAZIDE 50-12.5 MG (HYZAAR) 50-12.5 MG PER TABLET losartan-hydrochlorothiazide 50-12.5 mg (HYZAAR) 50-12.5 mg per tablet       Take 1 tablet by mouth once daily.    Take 1 tablet by mouth once daily.   Discontinued Medications    MELOXICAM (MOBIC) 15 MG TABLET    TK 1 T PO  ONCE D    TADALAFIL (CIALIS) 20 MG TAB    1 PO QOD    TADALAFIL (CIALIS) 20 MG TAB    Take 1 tablet by mouth every other day.       Health Maintenance    Colonoscopy: 07/12/2021  (due in 2026)  Flu Vaccine: 10/16/2020  Tetanus/Tdap: 06/24/2015  Zoster Vaccine: 05/28/2022  HIV/Hepatitis C Screen: Declined  PSA: 06/20/2022      Patient ambulates on his own without an assistive device.     On average, how many days per week do you do moderate to strenuous exercise:  (like a brisk walk or jog; this does not include your job/work)  3-5     On average, how many minutes do you exercise at this level each day? 30    Do you eat fruits and vegetable every day?  Yes    Do you always wear your seat belt in the car?   "Yes    Have you ever used tobacco products?  Yes, former smoker - quit 10  years. Smoked 40 years - 1/4 pack per day.     Have you ever been screened for HIV? No  Would you like to be screened for HIV? No    Is urination or leaking urine a problem for you? Yes    Do you go to the dentist regularly? Yes  When was you last exam:     Do you go to the eye doctor regularly? Yes  When was your last exam:    Do you wear contacts, glasses, reading glasses? Yes    Do you have any personal or family history of breast/ovarian/colon cancer?  Yes       If yes, who?    Have you often been bothered by feeling down, depressed, or hopeless?  Not at all    Have you often been bothered by having little interest or pleasure in doing things?  Not at all    Review of Systems   Constitutional:  Negative for chills and fever.   Respiratory:  Negative for cough and shortness of breath.    Cardiovascular:  Negative for chest pain.   Neurological:  Negative for dizziness and headaches.     Objective:     /88 (BP Location: Right arm, Patient Position: Sitting, BP Method: Medium (Manual))   Pulse 69   Temp 97 °F (36.1 °C) (Temporal)   Resp 20   Ht 5' 7" (1.702 m)   Wt 77.3 kg (170 lb 6.7 oz)   SpO2 98%   BMI 26.69 kg/m²     Physical Exam  Vitals reviewed.   Constitutional:       Appearance: Normal appearance.   HENT:      Head: Normocephalic and atraumatic.   Cardiovascular:      Rate and Rhythm: Normal rate and regular rhythm.      Heart sounds: Normal heart sounds. No murmur heard.  Pulmonary:      Effort: Pulmonary effort is normal.      Breath sounds: Normal breath sounds. No wheezing.   Skin:     General: Skin is warm and dry.   Neurological:      Mental Status: He is alert and oriented to person, place, and time.     BP Readings from Last 3 Encounters:   08/29/22 136/88   06/23/22 (!) 142/87   04/26/22 (!) 149/89     Wt Readings from Last 3 Encounters:   08/29/22 77.3 kg (170 lb 6.7 oz)   06/23/22 75.8 kg (167 lb)   04/26/22 " 75.6 kg (166 lb 10.7 oz)       Last Labs:  Glucose   Date Value Ref Range Status   08/19/2022 100 70 - 110 mg/dL Final   08/20/2021 101 70 - 110 mg/dL Final     BUN   Date Value Ref Range Status   08/19/2022 17 8 - 23 mg/dL Final   08/20/2021 15 6 - 20 mg/dL Final     Creatinine   Date Value Ref Range Status   08/19/2022 1.2 0.5 - 1.4 mg/dL Final   08/20/2021 1.0 0.5 - 1.4 mg/dL Final     Cholesterol   Date Value Ref Range Status   08/19/2022 244 (H) 120 - 199 mg/dL Final     Comment:     The National Cholesterol Education Program (NCEP) has set the  following guidelines (reference ranges) for Cholesterol:  Optimal.....................<200 mg/dL  Borderline High.............200-239 mg/dL  High........................> or = 240 mg/dL     08/20/2021 214 (H) 120 - 199 mg/dL Final     Comment:     The National Cholesterol Education Program (NCEP) has set the  following guidelines (reference ranges) for Cholesterol:  Optimal.....................<200 mg/dL  Borderline High.............200-239 mg/dL  High........................> or = 240 mg/dL       No results found for: HGBA1C  Hemoglobin   Date Value Ref Range Status   08/19/2022 13.3 (L) 14.0 - 18.0 g/dL Final   02/22/2022 12.9 (L) 14.0 - 18.0 g/dL Final     Hematocrit   Date Value Ref Range Status   08/19/2022 41.8 40.0 - 54.0 % Final   02/22/2022 40.4 40.0 - 54.0 % Final     No results found for: [id number]    I have reviewed the following:     Details / Date    [x]   Labs     []   Micro     []   Pathology     []   Imaging     []   Cardiology Procedures     [x]   Other  Patient's Medical and Surgical History        Assessment and Plan:     1. Encounter for annual health examination    --Nutrition: Discussed the importance of moderate caffeine intake. Adhering to a low sodium, low saturated fat/low cholesterol diet.   --Exercise: Discussed the importance of regular exercise. At least 30 minutes 5 days per week.   --Immunizations reviewed.  --Discussed benefits of  maintaining up to date health maintenance.    - CBC Auto Differential; Future  - Comprehensive Metabolic Panel; Future  - Lipid Panel; Future  - TSH; Future    2. Primary hypertension    Chronic, stable, continue medication.     - losartan-hydrochlorothiazide 50-12.5 mg (HYZAAR) 50-12.5 mg per tablet; Take 1 tablet by mouth once daily.  Dispense: 90 tablet; Refill: 3    3. Other hyperlipidemia    Chronic, worsening, discussed diet and exercise.   Patient to consider medication.     The 10-year ASCVD risk score ([place] DARNELL, et al., 2019) is: 13.5%    Values used to calculate the score:      Age: 62 years      Sex: Male      Is Non- : No      Diabetic: No      Tobacco smoker: No      Systolic Blood Pressure: 136 mmHg      Is BP treated: Yes      HDL Cholesterol: 54 mg/dL      Total Cholesterol: 244 mg/dL    4. Urgency of urination  5. Pelvic floor dysfunction  6. Erectile dysfunction following radical prostatectomy     Chronic, stable, on Viagra as needed. Follow up with Urology.   Continue PT.

## 2022-09-08 NOTE — PROGRESS NOTES
Pelvic Health Physical Therapy   Treatment Note     Name: Nilton Chin  Clinic Number: [id number]    Therapy Diagnosis:   Encounter Diagnosis   Name Primary?    Pelvic floor dysfunction Yes     Physician: Walker Schmid MD    Visit Date: 9/9/2022    Physician Orders: PT Eval and Treat    Medical Diagnosis from Referral: Urgency of urination [R39.15]  Evaluation Date: 8/9/2022  Authorization Period Expiration: 6/23/2023  Plan of Care Expiration: 11/9/2022  Visit # / Visits authorized: 3/1  Cancelled Visits: 0  No Show Visits: 0    Time In: 10:08  Time Out: 10:48  Total Billable Time:  40 minutes    Precautions: Standard    Subjective     Pt reports: that he has been able to go 3 hours between voids at times.  He is I with double voiding and with pelvic floor check ins.    He was compliant with home exercise program.  Response to previous treatment: no adverse effects  Functional change: Decreased nocturia and urinary frequency    Pain: 0/10    Objective     Nilton participated in neuromuscular re-education activities to develop Coordination and Down training for 40 minutes including: Kegels with assist of SEMG and diaphragmatic breathing; quick flicks  We worked in supine and standing with external lead wires.  He demonstrated elevated baseline resting initially.   In 10 sec Kegels his WR rise was good, holding fair, and derecruitment timely but inconsistent.  We then moved to standing, and he was instructed to lean against the wall for support- in 10 sec Kegels in this position he demonstrated normal baseline resting, good WR rise, and fair/good holding ability.  derecruitment was timely and more complete.  He was instructed in urge control strategies at the end of the session.       Home Exercises Provided and Patient Education Provided     Education provided:   - posture/body mechanices and diaphragmatic breathing  Discussed progression of plan of care with patient; educated pt in activity modification;  reviewed HEP with pt. Pt demonstrated and verbalized understanding of all instruction and was provided with a handout of HEP (see Patient Instructions).    Written Home Exercises Provided: yes.  Exercises were reviewed and Nilton was able to demonstrate them prior to the end of the session.  Nilton demonstrated good  understanding of the education provided.     See EMR under Patient Instructions for exercises provided 9/9/2022    Assessment     Pt with improvement in symptoms and is getting improved use of muscle length.  Next visit may be last.      Nilton Is progressing well towards his goals.   Pt prognosis is Excellent.     Pt will continue to benefit from skilled outpatient physical therapy to address the deficits listed in the problem list box on initial evaluation, provide pt/family education and to maximize pt's level of independence in the home and community environment.     Pt's spiritual, cultural and educational needs considered and pt agreeable to plan of care and goals.     Anticipated barriers to physical therapy: none    Goals: 12 weeks   Pt will verbalize improved awareness of PFM activity as palpated by PT in order to improve activity involvement with HEP.  Pt will report improved ability to manage bladder spasms appropriately 100% of the time for an improvement in urinary frequency/urgency.   Pt will report improved ability to sleep uninterrupted by excessive nocturia 5/7 days per week.   Pt/family will be independent with HEP for continued self-management of symptoms.  ALL PROGRESSING    Plan     Plan of care Certification: 8/9/2022 to 11/9/2022.     Outpatient Physical Therapy 1 times per 2 week(s)  for 12 weeks to include the following interventions: therapeutic exercises, neuromuscular re-education, manual therapy, modalities PRN, patient/family education and self care/home management    Kourtney Soto, PT, BCB-PMD

## 2022-09-09 ENCOUNTER — CLINICAL SUPPORT (OUTPATIENT)
Dept: REHABILITATION | Facility: HOSPITAL | Age: 62
End: 2022-09-09
Attending: STUDENT IN AN ORGANIZED HEALTH CARE EDUCATION/TRAINING PROGRAM
Payer: COMMERCIAL

## 2022-09-09 DIAGNOSIS — M62.89 PELVIC FLOOR DYSFUNCTION: Primary | ICD-10-CM

## 2022-09-09 PROCEDURE — 97112 NEUROMUSCULAR REEDUCATION: CPT | Mod: PO

## 2022-09-28 ENCOUNTER — CLINICAL SUPPORT (OUTPATIENT)
Dept: REHABILITATION | Facility: HOSPITAL | Age: 62
End: 2022-09-28
Attending: STUDENT IN AN ORGANIZED HEALTH CARE EDUCATION/TRAINING PROGRAM
Payer: COMMERCIAL

## 2022-09-28 DIAGNOSIS — M62.89 PELVIC FLOOR DYSFUNCTION: Primary | ICD-10-CM

## 2022-09-28 PROCEDURE — 97112 NEUROMUSCULAR REEDUCATION: CPT | Mod: PO

## 2022-09-28 NOTE — PATIENT INSTRUCTIONS
"Home Program: 9/28/2022    Continue with check ins and double voiding.    Use urge control strategies as needed.    Maintenance Kegels in the shower:  do 1 set of 10 Kegels, holding 10 sec and letting go 10 sec (remember both are important) every day in the shower, or while doing something in standing that you do every day.  This is to "touch base" with these muscles every day.      Focus on BALANCE!  "

## 2022-09-28 NOTE — PROGRESS NOTES
Pelvic Health Physical Therapy   Treatment Note     Name: Nilton Chin  Clinic Number: [id number]    Therapy Diagnosis:   Encounter Diagnosis   Name Primary?    Pelvic floor dysfunction Yes     Physician: Walker Schmid MD    Visit Date: 9/28/2022    Physician Orders: PT Eval and Treat    Medical Diagnosis from Referral: Urgency of urination [R39.15]  Evaluation Date: 8/9/2022  Authorization Period Expiration: 6/23/2023  Plan of Care Expiration: 11/9/2022  Visit # / Visits authorized: 4/20  Cancelled Visits: 0  No Show Visits: 0    Time In: 11:06  Time Out: 11:38  Total Billable Time:  32 minutes    Precautions: Standard    Subjective     Pt reports: that he is avoiding caffeine and is able to void every 2-3 hours.  He is I with double voiding and with pelvic floor check ins.    He was compliant with home exercise program.  Response to previous treatment: no adverse effects  Functional change: Decreased nocturia and urinary frequency    Pain: 0/10    Objective     Nilton participated in neuromuscular re-education activities to develop Coordination and Down training for 32 minutes including: Kegels with assist of SEMG and diaphragmatic breathing; quick flicks  We worked in supine and standing with external lead wires.  He demonstrated elevated baseline resting initially (less than previous sessions).   In 10 sec Kegels his WR rise was good, holding fair/good, and derecruitment timely and complete.  We then moved to standing- in 10 sec Kegels in this position he demonstrated normal baseline resting, good WR rise, and fair/good holding ability.  derecruitment was timely and more complete.  QF were performed here as well.  We reviewed maintenance Kegels, and his d/c home program.  All questions were answered.  We spoke about yoga to help manage his symptoms.      Home Exercises Provided and Patient Education Provided     Education provided:   - posture/body mechanices and diaphragmatic breathing  Discussed  progression of plan of care with patient; educated pt in activity modification; reviewed HEP with pt. Pt demonstrated and verbalized understanding of all instruction and was provided with a handout of HEP (see Patient Instructions).    Written Home Exercises Provided: yes.  Exercises were reviewed and Nilton was able to demonstrate them prior to the end of the session.  Nilton demonstrated good  understanding of the education provided.     See EMR under Patient Instructions for exercises provided 9/28/2022    Assessment     Pt symptoms are more under control and he is I with his home program.  No further need for PT services at this time.     Goals: 12 weeks   Pt will verbalize improved awareness of PFM activity as palpated by PT in order to improve activity involvement with HEP.  Pt will report improved ability to manage bladder spasms appropriately 100% of the time for an improvement in urinary frequency/urgency.   Pt will report improved ability to sleep uninterrupted by excessive nocturia 5/7 days per week.   Pt/family will be independent with HEP for continued self-management of symptoms.  ALL MET    Plan     D/c PT    Kourtney Soto, PT, BCB-PMD

## 2022-11-01 ENCOUNTER — OFFICE VISIT (OUTPATIENT)
Dept: INTERNAL MEDICINE | Facility: CLINIC | Age: 62
End: 2022-11-01
Payer: COMMERCIAL

## 2022-11-01 VITALS
HEIGHT: 67 IN | WEIGHT: 171.31 LBS | BODY MASS INDEX: 26.89 KG/M2 | SYSTOLIC BLOOD PRESSURE: 150 MMHG | OXYGEN SATURATION: 98 % | DIASTOLIC BLOOD PRESSURE: 98 MMHG | HEART RATE: 78 BPM

## 2022-11-01 DIAGNOSIS — B34.9 VIRAL SYNDROME: Primary | ICD-10-CM

## 2022-11-01 DIAGNOSIS — R05.9 COUGH: ICD-10-CM

## 2022-11-01 DIAGNOSIS — J06.9 UPPER RESPIRATORY TRACT INFECTION, UNSPECIFIED TYPE: ICD-10-CM

## 2022-11-01 LAB
INFLUENZA A, MOLECULAR: NEGATIVE
INFLUENZA B, MOLECULAR: NEGATIVE
SARS-COV-2 RNA RESP QL NAA+PROBE: NOT DETECTED
SPECIMEN SOURCE: NORMAL

## 2022-11-01 PROCEDURE — 99999 PR PBB SHADOW E&M-EST. PATIENT-LVL IV: ICD-10-PCS | Mod: PBBFAC,,, | Performed by: INTERNAL MEDICINE

## 2022-11-01 PROCEDURE — 99999 PR PBB SHADOW E&M-EST. PATIENT-LVL IV: CPT | Mod: PBBFAC,,, | Performed by: INTERNAL MEDICINE

## 2022-11-01 PROCEDURE — 3077F SYST BP >= 140 MM HG: CPT | Mod: CPTII,S$GLB,, | Performed by: INTERNAL MEDICINE

## 2022-11-01 PROCEDURE — U0003 INFECTIOUS AGENT DETECTION BY NUCLEIC ACID (DNA OR RNA); SEVERE ACUTE RESPIRATORY SYNDROME CORONAVIRUS 2 (SARS-COV-2) (CORONAVIRUS DISEASE [COVID-19]), AMPLIFIED PROBE TECHNIQUE, MAKING USE OF HIGH THROUGHPUT TECHNOLOGIES AS DESCRIBED BY CMS-2020-01-R: HCPCS | Performed by: INTERNAL MEDICINE

## 2022-11-01 PROCEDURE — 1159F MED LIST DOCD IN RCRD: CPT | Mod: CPTII,S$GLB,, | Performed by: INTERNAL MEDICINE

## 2022-11-01 PROCEDURE — 1159F PR MEDICATION LIST DOCUMENTED IN MEDICAL RECORD: ICD-10-PCS | Mod: CPTII,S$GLB,, | Performed by: INTERNAL MEDICINE

## 2022-11-01 PROCEDURE — 1160F PR REVIEW ALL MEDS BY PRESCRIBER/CLIN PHARMACIST DOCUMENTED: ICD-10-PCS | Mod: CPTII,S$GLB,, | Performed by: INTERNAL MEDICINE

## 2022-11-01 PROCEDURE — 99213 PR OFFICE/OUTPT VISIT, EST, LEVL III, 20-29 MIN: ICD-10-PCS | Mod: S$GLB,,, | Performed by: INTERNAL MEDICINE

## 2022-11-01 PROCEDURE — 3008F PR BODY MASS INDEX (BMI) DOCUMENTED: ICD-10-PCS | Mod: CPTII,S$GLB,, | Performed by: INTERNAL MEDICINE

## 2022-11-01 PROCEDURE — 3008F BODY MASS INDEX DOCD: CPT | Mod: CPTII,S$GLB,, | Performed by: INTERNAL MEDICINE

## 2022-11-01 PROCEDURE — U0005 INFEC AGEN DETEC AMPLI PROBE: HCPCS | Performed by: INTERNAL MEDICINE

## 2022-11-01 PROCEDURE — 3080F PR MOST RECENT DIASTOLIC BLOOD PRESSURE >= 90 MM HG: ICD-10-PCS | Mod: CPTII,S$GLB,, | Performed by: INTERNAL MEDICINE

## 2022-11-01 PROCEDURE — 3080F DIAST BP >= 90 MM HG: CPT | Mod: CPTII,S$GLB,, | Performed by: INTERNAL MEDICINE

## 2022-11-01 PROCEDURE — 87502 INFLUENZA DNA AMP PROBE: CPT | Performed by: INTERNAL MEDICINE

## 2022-11-01 PROCEDURE — 99213 OFFICE O/P EST LOW 20 MIN: CPT | Mod: S$GLB,,, | Performed by: INTERNAL MEDICINE

## 2022-11-01 PROCEDURE — 3077F PR MOST RECENT SYSTOLIC BLOOD PRESSURE >= 140 MM HG: ICD-10-PCS | Mod: CPTII,S$GLB,, | Performed by: INTERNAL MEDICINE

## 2022-11-01 PROCEDURE — 1160F RVW MEDS BY RX/DR IN RCRD: CPT | Mod: CPTII,S$GLB,, | Performed by: INTERNAL MEDICINE

## 2022-11-01 NOTE — PROGRESS NOTES
Chief Complaint  Chief Complaint   Patient presents with    Follow-up    Cough     Coughing x2 days       HPI  Nilton Chin is a 62 y.o. male with multiple medical diagnoses as listed in the medical history and problem list that presents for urgent care visit.  His last appointment with primary care was Visit date not found.      Mr. Chin reports a persistent and productive cough x2 days. Of note he had a sore throat, stuffy ears, mild headache and runny nose last week which resolved after a few days. He notes sick contacts and two negative at-home covid tests. He has been taking Mucinex and promethazine with benefit. He denies fever, fatigue, CP, SOB, N/V/D. He has not had a recent Covid booster or flu vaccine this year.       PAST MEDICAL HISTORY:  Past Medical History:   Diagnosis Date    Acquired spondylolisthesis 8/29/2022    Allergy     Anal fissure 6/8/2016    Displacement of cervical intervertebral disc 8/29/2022    History of prostatitis     Hypertension     Iliotibial band syndrome affecting lower leg 6/24/2015    Thoracic facet syndrome 8/29/2022       PAST SURGICAL HISTORY:  Past Surgical History:   Procedure Laterality Date    COLONOSCOPY      PROSTATE BIOPSY      ROBOT-ASSISTED LAPAROSCOPIC PROSTATECTOMY USING DA EMELY XI N/A 11/25/2019    Procedure: XI ROBOTIC PROSTATECTOMY;  Surgeon: Jose Carlos Lawrence MD;  Location: [facility] OR [address] [place] [address];  Service: Urology;  Laterality: N/A;  3hrs gen with regional        SOCIAL HISTORY:  Social History     Socioeconomic History    Marital status:     Number of children: 3   Occupational History    Occupation:    Tobacco Use    Smoking status: Former     Years: 35.00     Types: Cigarettes     Start date: 6/24/2013    Smokeless tobacco: Never    Tobacco comments:     6 cig a day   Substance and Sexual Activity    Alcohol use: Yes     Comment: socially/ not weekly    Drug use: No    Sexual activity: Yes     Partners: Female       FAMILY  HISTORY:  Family History   Problem Relation Age of Onset    Cataracts Mother     Hypertension Mother     Cataracts Father     Glaucoma Father     Hypertension Father     Blindness Other     Cancer Paternal Grandfather         lung/prostate    Diabetes Paternal Grandfather     Diabetes Maternal Grandmother     Heart disease Maternal Grandmother     Amblyopia Neg Hx     Strabismus Neg Hx     Retinal detachment Neg Hx     Macular degeneration Neg Hx     Stroke Neg Hx     Melanoma Neg Hx        ALLERGIES AND MEDICATIONS: updated and reviewed.  Review of patient's allergies indicates:   Allergen Reactions    Codeine Nausea Only    Vicodin [hydrocodone-acetaminophen]      unknown     Current Outpatient Medications   Medication Sig Dispense Refill    losartan-hydrochlorothiazide 50-12.5 mg (HYZAAR) 50-12.5 mg per tablet Take 1 tablet by mouth once daily. 90 tablet 3    cetirizine (ZYRTEC) 10 MG tablet Take 10 mg by mouth once daily.      fluticasone propionate (FLONASE) 50 mcg/actuation nasal spray 2 sprays (100 mcg total) by Each Nostril route once daily. (Patient not taking: Reported on 11/1/2022) 9.9 mL 11    omeprazole (PRILOSEC) 40 MG capsule Take 1 capsule (40 mg total) by mouth every morning. (Patient not taking: Reported on 11/1/2022) 30 capsule 3    sildenafiL (VIAGRA) 100 MG tablet Take 1 tablet (100 mg total) by mouth daily as needed for Erectile Dysfunction. 10 tablet 6     Current Facility-Administered Medications   Medication Dose Route Frequency Provider Last Rate Last Admin    sars-cov-2 (covid-19) 100 mcg/0.5 ml injection 0.5 mL  0.5 mL Intramuscular 1 time in Clinic/[facility]  Review of Systems   Constitutional:  Negative for fatigue, fever and unexpected weight change.   HENT:  Negative for congestion, ear pain, rhinorrhea and sore throat.    Respiratory:  Positive for cough. Negative for shortness of breath and wheezing.    Cardiovascular:  Negative for chest pain,  "palpitations and leg swelling.   Gastrointestinal:  Negative for abdominal pain, constipation, diarrhea, nausea and vomiting.   Genitourinary:  Negative for flank pain and hematuria.   Musculoskeletal:  Positive for myalgias. Negative for arthralgias.   Skin:  Negative for color change and rash.   Neurological:  Negative for syncope, weakness and headaches.   Psychiatric/Behavioral:  Negative for agitation and confusion. The patient is not nervous/anxious.          Physical Exam  Vitals:    11/01/22 0847   BP: (!) 150/98   BP Location: Right arm   Patient Position: Sitting   BP Method: Medium (Manual)   Pulse: 78   SpO2: 98%   Weight: 77.7 kg (171 lb 4.8 oz)   Height: 5' 7" (1.702 m)    Body mass index is 26.83 kg/m².  Weight: 77.7 kg (171 lb 4.8 oz)   Height: 5' 7" (170.2 cm)   Physical Exam  Constitutional:       Appearance: Normal appearance. He is normal weight.   HENT:      Head: Normocephalic and atraumatic.      Right Ear: Tympanic membrane normal.      Left Ear: Tympanic membrane normal.      Mouth/Throat:      Mouth: Mucous membranes are dry.      Pharynx: Oropharynx is clear. No posterior oropharyngeal erythema.   Eyes:      Extraocular Movements: Extraocular movements intact.      Conjunctiva/sclera: Conjunctivae normal.      Pupils: Pupils are equal, round, and reactive to light.   Cardiovascular:      Rate and Rhythm: Normal rate and regular rhythm.      Pulses: Normal pulses.      Heart sounds: Normal heart sounds. No murmur heard.  Pulmonary:      Effort: Pulmonary effort is normal.      Breath sounds: Normal breath sounds.   Abdominal:      General: Abdomen is flat. Bowel sounds are normal.      Palpations: Abdomen is soft.   Musculoskeletal:         General: Normal range of motion.   Skin:     General: Skin is warm and dry.      Capillary Refill: Capillary refill takes less than 2 seconds.   Neurological:      General: No focal deficit present.      Mental Status: He is alert and oriented to person, " place, and time. Mental status is at baseline.   Psychiatric:         Mood and Affect: Mood normal.         Behavior: Behavior normal.         Judgment: Judgment normal.         Health Maintenance         Date Due Completion Date    Pneumococcal Vaccines (Age 0-64) (1 - PCV) Never done ---    HIV Screening Never done ---    COVID-19 Vaccine (4 - Booster for Pfizer series) 02/03/2022 12/9/2021    Shingles Vaccine (2 of 2) 07/23/2022 5/28/2022    Influenza Vaccine (1) 09/01/2022 10/16/2020    TETANUS VACCINE 06/24/2025 6/24/2015    Colorectal Cancer Screening 07/12/2026 7/12/2021    Lipid Panel 08/19/2027 8/19/2022              Assessment and Plan:  63 y/o male presents for urgent care visit for productive cough.    Viral syndrome  -     Influenza A & B by Molecular    Upper respiratory tract infection, unspecified type  -     Influenza A & B by Molecular    - Discussed viral upper respiratory tract infection as most likely etiology. Covid and Flu swabs today.  - Pateint was advised to continue Promethazine and Mucinex for productive cough.  - RTC prn or if symptoms worsen.    Anthony Reyes, MS4  -[employer]    I hereby acknowledge that I am relying upon documentation authored by a medical student working under my supervision and further I hereby attest that I have verified the student documentation or findings by personally re-performing the physical exam and medical decision making activities of the Evaluation and Management service to be billed.    Rtc prn    G Romina Crook MD MPH  Staff Internist

## 2022-12-15 ENCOUNTER — LAB VISIT (OUTPATIENT)
Dept: LAB | Facility: OTHER | Age: 62
End: 2022-12-15
Attending: RADIOLOGY
Payer: COMMERCIAL

## 2022-12-15 DIAGNOSIS — C61 PROSTATE CANCER: ICD-10-CM

## 2022-12-15 LAB — COMPLEXED PSA SERPL-MCNC: <0.01 NG/ML (ref 0–4)

## 2022-12-15 PROCEDURE — 36415 COLL VENOUS BLD VENIPUNCTURE: CPT | Performed by: RADIOLOGY

## 2022-12-15 PROCEDURE — 84153 ASSAY OF PSA TOTAL: CPT | Performed by: RADIOLOGY

## 2022-12-19 ENCOUNTER — OFFICE VISIT (OUTPATIENT)
Dept: RADIATION ONCOLOGY | Facility: CLINIC | Age: 62
End: 2022-12-19
Attending: RADIOLOGY
Payer: COMMERCIAL

## 2022-12-19 VITALS
RESPIRATION RATE: 16 BRPM | WEIGHT: 173 LBS | HEART RATE: 68 BPM | BODY MASS INDEX: 27.15 KG/M2 | DIASTOLIC BLOOD PRESSURE: 84 MMHG | HEIGHT: 67 IN | SYSTOLIC BLOOD PRESSURE: 164 MMHG

## 2022-12-19 DIAGNOSIS — C61 PROSTATE CANCER: ICD-10-CM

## 2022-12-19 DIAGNOSIS — N52.31 ERECTILE DYSFUNCTION FOLLOWING RADICAL PROSTATECTOMY: ICD-10-CM

## 2022-12-19 PROCEDURE — 99212 OFFICE O/P EST SF 10 MIN: CPT | Mod: S$GLB,,, | Performed by: RADIOLOGY

## 2022-12-19 PROCEDURE — 1160F RVW MEDS BY RX/DR IN RCRD: CPT | Mod: CPTII,S$GLB,, | Performed by: RADIOLOGY

## 2022-12-19 PROCEDURE — 99999 PR PBB SHADOW E&M-EST. PATIENT-LVL III: ICD-10-PCS | Mod: PBBFAC,,, | Performed by: RADIOLOGY

## 2022-12-19 PROCEDURE — 3008F PR BODY MASS INDEX (BMI) DOCUMENTED: ICD-10-PCS | Mod: CPTII,S$GLB,, | Performed by: RADIOLOGY

## 2022-12-19 PROCEDURE — 3079F DIAST BP 80-89 MM HG: CPT | Mod: CPTII,S$GLB,, | Performed by: RADIOLOGY

## 2022-12-19 PROCEDURE — 3008F BODY MASS INDEX DOCD: CPT | Mod: CPTII,S$GLB,, | Performed by: RADIOLOGY

## 2022-12-19 PROCEDURE — 99999 PR PBB SHADOW E&M-EST. PATIENT-LVL III: CPT | Mod: PBBFAC,,, | Performed by: RADIOLOGY

## 2022-12-19 PROCEDURE — 1160F PR REVIEW ALL MEDS BY PRESCRIBER/CLIN PHARMACIST DOCUMENTED: ICD-10-PCS | Mod: CPTII,S$GLB,, | Performed by: RADIOLOGY

## 2022-12-19 PROCEDURE — 3077F PR MOST RECENT SYSTOLIC BLOOD PRESSURE >= 140 MM HG: ICD-10-PCS | Mod: CPTII,S$GLB,, | Performed by: RADIOLOGY

## 2022-12-19 PROCEDURE — 1159F PR MEDICATION LIST DOCUMENTED IN MEDICAL RECORD: ICD-10-PCS | Mod: CPTII,S$GLB,, | Performed by: RADIOLOGY

## 2022-12-19 PROCEDURE — 1159F MED LIST DOCD IN RCRD: CPT | Mod: CPTII,S$GLB,, | Performed by: RADIOLOGY

## 2022-12-19 PROCEDURE — 3077F SYST BP >= 140 MM HG: CPT | Mod: CPTII,S$GLB,, | Performed by: RADIOLOGY

## 2022-12-19 PROCEDURE — 99212 PR OFFICE/OUTPT VISIT, EST, LEVL II, 10-19 MIN: ICD-10-PCS | Mod: S$GLB,,, | Performed by: RADIOLOGY

## 2022-12-19 PROCEDURE — 3079F PR MOST RECENT DIASTOLIC BLOOD PRESSURE 80-89 MM HG: ICD-10-PCS | Mod: CPTII,S$GLB,, | Performed by: RADIOLOGY

## 2022-12-19 RX ORDER — TADALAFIL 20 MG/1
1 TABLET ORAL EVERY OTHER DAY
COMMUNITY
End: 2022-12-19

## 2022-12-19 RX ORDER — SILDENAFIL 100 MG/1
100 TABLET, FILM COATED ORAL DAILY PRN
Qty: 10 TABLET | Refills: 6 | Status: SHIPPED | OUTPATIENT
Start: 2022-12-19 | End: 2023-12-12 | Stop reason: SDUPTHER

## 2022-12-19 NOTE — PROGRESS NOTES
Subjective:       Patient ID: Nilton Chin is a 62 y.o. male.    Chief Complaint: Prostate Cancer (1yr f/u;psa)    This patient presents for follow up visit.    Mr. Chin has a history of pathologic Stage IIIB (pT3a, pN0, cM0, PSA: 4.4, Grade Group: 2) adenocarcinoma of the prostate.  The patient initially presented in August of 2019 with a slightly elevated screening PSA of 4.4 ng/ml.  Biopsies revealed Kerrick 8 (4+4) adenocarcinoma in the Rt. mid gland, Ulises 7 (3+4) adenocarcinoma at the Lt. apex, Rt. apex and Rt. base and Kerrick 6 (3+3) disease at the Lt. mid gland.  He underwent RALP with bilateral tori dissection on 11/25/19.  Pathology revealed Ulises 7 (3+4) adenocarcinoma with extraprostatic extension posteriorly at the apex.  There was no seminal vesicle or urinary bladder neck invasion.  The margins of resection were negative.  Two Rt. and 1 Lt. pelvic nodes were negative for tumor involvement.  He was recently referred for salvage irradiation after his PSA increased to 0.2 ng/ml.  He received a 6 month Lupron injection on 12/10/20.  He completed salvage radiotherapy to the prostate bed and pelvic nodes on 4/1/21.  He has remained ABHISHEK since that time.  Today the patient states he feels well.     Review of Systems   Constitutional:  Negative for activity change, appetite change, chills and fatigue.   Respiratory:  Negative for cough and shortness of breath.    Gastrointestinal:  Negative for abdominal pain, constipation, diarrhea and fecal incontinence.   Genitourinary:  Positive for erectile dysfunction (corrected with Viagra). Negative for bladder incontinence, difficulty urinating, dysuria, frequency and hematuria.        Nocturia 1 x per night        Objective:      Physical Exam  Constitutional:       General: He is not in acute distress.     Appearance: Normal appearance.   Abdominal:      General: Abdomen is flat. There is no distension.   Neurological:      Mental Status: He is alert and  oriented to person, place, and time.   Psychiatric:         Mood and Affect: Mood normal.         Judgment: Judgment normal.       PSA < 0.01 ng/ml    Assessment:       Problem List Items Addressed This Visit       Prostate cancer    Relevant Medications    sildenafiL (VIAGRA) 100 MG tablet    Erectile dysfunction following radical prostatectomy    Relevant Medications    sildenafiL (VIAGRA) 100 MG tablet         Plan:       Doing well  no evidence of tumor recurrence or progression.  He is scheduled for follow up with Dr. Lawrence in 6 months.  Plan follow up with us in 1 year with PSA.

## 2023-01-23 ENCOUNTER — PATIENT MESSAGE (OUTPATIENT)
Dept: UROLOGY | Facility: CLINIC | Age: 63
End: 2023-01-23
Payer: COMMERCIAL

## 2023-02-08 ENCOUNTER — TELEPHONE (OUTPATIENT)
Dept: INTERNAL MEDICINE | Facility: CLINIC | Age: 63
End: 2023-02-08

## 2023-02-08 ENCOUNTER — OFFICE VISIT (OUTPATIENT)
Dept: INTERNAL MEDICINE | Facility: CLINIC | Age: 63
End: 2023-02-08
Payer: COMMERCIAL

## 2023-02-08 VITALS
DIASTOLIC BLOOD PRESSURE: 60 MMHG | RESPIRATION RATE: 12 BRPM | SYSTOLIC BLOOD PRESSURE: 132 MMHG | HEART RATE: 60 BPM | TEMPERATURE: 99 F | HEIGHT: 67 IN | WEIGHT: 172.63 LBS | BODY MASS INDEX: 27.09 KG/M2

## 2023-02-08 DIAGNOSIS — R05.9 COUGH, UNSPECIFIED TYPE: Primary | ICD-10-CM

## 2023-02-08 DIAGNOSIS — R53.83 FATIGUE, UNSPECIFIED TYPE: ICD-10-CM

## 2023-02-08 LAB
CTP QC/QA: YES
CTP QC/QA: YES
POC MOLECULAR INFLUENZA A AGN: NEGATIVE
POC MOLECULAR INFLUENZA B AGN: NEGATIVE
SARS-COV-2 AG RESP QL IA.RAPID: POSITIVE

## 2023-02-08 PROCEDURE — 87811 SARS-COV-2 COVID19 W/OPTIC: CPT | Mod: QW,S$GLB,, | Performed by: INTERNAL MEDICINE

## 2023-02-08 PROCEDURE — 1159F PR MEDICATION LIST DOCUMENTED IN MEDICAL RECORD: ICD-10-PCS | Mod: CPTII,S$GLB,, | Performed by: INTERNAL MEDICINE

## 2023-02-08 PROCEDURE — 87811 SARS CORONAVIRUS 2 ANTIGEN POCT, MANUAL READ: ICD-10-PCS | Mod: QW,S$GLB,, | Performed by: INTERNAL MEDICINE

## 2023-02-08 PROCEDURE — 99214 OFFICE O/P EST MOD 30 MIN: CPT | Mod: S$GLB,,, | Performed by: INTERNAL MEDICINE

## 2023-02-08 PROCEDURE — 3008F BODY MASS INDEX DOCD: CPT | Mod: CPTII,S$GLB,, | Performed by: INTERNAL MEDICINE

## 2023-02-08 PROCEDURE — 99214 PR OFFICE/OUTPT VISIT, EST, LEVL IV, 30-39 MIN: ICD-10-PCS | Mod: S$GLB,,, | Performed by: INTERNAL MEDICINE

## 2023-02-08 PROCEDURE — 1159F MED LIST DOCD IN RCRD: CPT | Mod: CPTII,S$GLB,, | Performed by: INTERNAL MEDICINE

## 2023-02-08 PROCEDURE — 3078F DIAST BP <80 MM HG: CPT | Mod: CPTII,S$GLB,, | Performed by: INTERNAL MEDICINE

## 2023-02-08 PROCEDURE — 1160F PR REVIEW ALL MEDS BY PRESCRIBER/CLIN PHARMACIST DOCUMENTED: ICD-10-PCS | Mod: CPTII,S$GLB,, | Performed by: INTERNAL MEDICINE

## 2023-02-08 PROCEDURE — 87502 POCT INFLUENZA A/B MOLECULAR: ICD-10-PCS | Mod: QW,S$GLB,, | Performed by: INTERNAL MEDICINE

## 2023-02-08 PROCEDURE — 99999 PR PBB SHADOW E&M-EST. PATIENT-LVL III: ICD-10-PCS | Mod: PBBFAC,,, | Performed by: INTERNAL MEDICINE

## 2023-02-08 PROCEDURE — 3078F PR MOST RECENT DIASTOLIC BLOOD PRESSURE < 80 MM HG: ICD-10-PCS | Mod: CPTII,S$GLB,, | Performed by: INTERNAL MEDICINE

## 2023-02-08 PROCEDURE — 1160F RVW MEDS BY RX/DR IN RCRD: CPT | Mod: CPTII,S$GLB,, | Performed by: INTERNAL MEDICINE

## 2023-02-08 PROCEDURE — 3075F PR MOST RECENT SYSTOLIC BLOOD PRESS GE 130-139MM HG: ICD-10-PCS | Mod: CPTII,S$GLB,, | Performed by: INTERNAL MEDICINE

## 2023-02-08 PROCEDURE — 3008F PR BODY MASS INDEX (BMI) DOCUMENTED: ICD-10-PCS | Mod: CPTII,S$GLB,, | Performed by: INTERNAL MEDICINE

## 2023-02-08 PROCEDURE — 87502 INFLUENZA DNA AMP PROBE: CPT | Mod: QW,S$GLB,, | Performed by: INTERNAL MEDICINE

## 2023-02-08 PROCEDURE — 3075F SYST BP GE 130 - 139MM HG: CPT | Mod: CPTII,S$GLB,, | Performed by: INTERNAL MEDICINE

## 2023-02-08 PROCEDURE — 99999 PR PBB SHADOW E&M-EST. PATIENT-LVL III: CPT | Mod: PBBFAC,,, | Performed by: INTERNAL MEDICINE

## 2023-02-08 RX ORDER — FLUTICASONE PROPIONATE 50 MCG
2 SPRAY, SUSPENSION (ML) NASAL DAILY
Qty: 9.9 ML | Refills: 11 | Status: SHIPPED | OUTPATIENT
Start: 2023-02-08 | End: 2023-08-31

## 2023-02-08 RX ORDER — PROMETHAZINE HYDROCHLORIDE AND DEXTROMETHORPHAN HYDROBROMIDE 6.25; 15 MG/5ML; MG/5ML
5 SYRUP ORAL EVERY 8 HOURS PRN
Qty: 180 ML | Refills: 0 | Status: SHIPPED | OUTPATIENT
Start: 2023-02-08 | End: 2023-02-18

## 2023-02-08 RX ORDER — LEVOCETIRIZINE DIHYDROCHLORIDE 5 MG/1
5 TABLET, FILM COATED ORAL NIGHTLY
Qty: 30 TABLET | Refills: 0 | Status: SHIPPED | OUTPATIENT
Start: 2023-02-08 | End: 2023-05-31

## 2023-02-08 NOTE — PROGRESS NOTES
Subjective:       Patient ID: Nilton Chin is a 62 y.o. male.    Chief Complaint: Cough (productive)    Cough      62 year old male here for evaluation of a productive cough.  This has been going on the last 3 days.  He went to a wedding Saturday.  He started with a dry cough Monday.  Tuesday, he felt beat up.  He was coughing up green mucus and blowing green mucus out of his nose.  He has no fevers, but had chills.  He has no SOB.  When he coughs, it feels like it is coming from his throat.    Review of Systems   Respiratory:  Positive for cough.        Objective:      Physical Exam  Vitals reviewed.   Constitutional:       Appearance: He is well-developed.   HENT:      Head: Normocephalic and atraumatic.      Mouth/Throat:      Pharynx: No oropharyngeal exudate.   Eyes:      General: No scleral icterus.        Right eye: No discharge.         Left eye: No discharge.      Pupils: Pupils are equal, round, and reactive to light.   Neck:      Thyroid: No thyromegaly.      Trachea: No tracheal deviation.   Cardiovascular:      Rate and Rhythm: Normal rate and regular rhythm.      Heart sounds: Normal heart sounds. No murmur heard.    No friction rub. No gallop.   Pulmonary:      Effort: Pulmonary effort is normal. No respiratory distress.      Breath sounds: Normal breath sounds. No wheezing or rales.   Chest:      Chest wall: No tenderness.   Abdominal:      General: Bowel sounds are normal. There is no distension.      Palpations: Abdomen is soft. There is no mass.      Tenderness: There is no abdominal tenderness. There is no guarding or rebound.   Musculoskeletal:         General: No tenderness. Normal range of motion.      Cervical back: Normal range of motion and neck supple.   Skin:     General: Skin is warm and dry.      Coloration: Skin is not pale.      Findings: No erythema or rash.   Neurological:      Mental Status: He is alert and oriented to person, place, and time.   Psychiatric:         Behavior:  Behavior normal.       Assessment:       1. Cough, unspecified type  - POCT Influenza A/B Molecular  - SARS Coronavirus 2 Antigen, POCT Manual Read    2. Fatigue, unspecified type  - POCT Influenza A/B Molecular  - SARS Coronavirus 2 Antigen, POCT Manual Read      Plan:       1/2.  Check rapid COVID, flu swabs.  If negative, advised patient this will likely worsen through Monday, and start improving by next Wednesday/Thursday.  Zyrtec and Flonase recommended for symptoms.  Phenergan DM cough syrup prescribed.

## 2023-03-17 ENCOUNTER — OFFICE VISIT (OUTPATIENT)
Dept: OPTOMETRY | Facility: CLINIC | Age: 63
End: 2023-03-17
Payer: COMMERCIAL

## 2023-03-17 DIAGNOSIS — H52.4 PRESBYOPIA: ICD-10-CM

## 2023-03-17 DIAGNOSIS — H25.13 NUCLEAR SCLEROSIS, BILATERAL: Primary | ICD-10-CM

## 2023-03-17 PROCEDURE — 92015 DETERMINE REFRACTIVE STATE: CPT | Mod: S$GLB,,, | Performed by: OPTOMETRIST

## 2023-03-17 PROCEDURE — 1159F MED LIST DOCD IN RCRD: CPT | Mod: CPTII,S$GLB,, | Performed by: OPTOMETRIST

## 2023-03-17 PROCEDURE — 92014 PR EYE EXAM, EST PATIENT,COMPREHESV: ICD-10-PCS | Mod: S$GLB,,, | Performed by: OPTOMETRIST

## 2023-03-17 PROCEDURE — 92015 PR REFRACTION: ICD-10-PCS | Mod: S$GLB,,, | Performed by: OPTOMETRIST

## 2023-03-17 PROCEDURE — 99999 PR PBB SHADOW E&M-EST. PATIENT-LVL II: ICD-10-PCS | Mod: PBBFAC,,, | Performed by: OPTOMETRIST

## 2023-03-17 PROCEDURE — 99999 PR PBB SHADOW E&M-EST. PATIENT-LVL II: CPT | Mod: PBBFAC,,, | Performed by: OPTOMETRIST

## 2023-03-17 PROCEDURE — 1160F PR REVIEW ALL MEDS BY PRESCRIBER/CLIN PHARMACIST DOCUMENTED: ICD-10-PCS | Mod: CPTII,S$GLB,, | Performed by: OPTOMETRIST

## 2023-03-17 PROCEDURE — 92014 COMPRE OPH EXAM EST PT 1/>: CPT | Mod: S$GLB,,, | Performed by: OPTOMETRIST

## 2023-03-17 PROCEDURE — 1159F PR MEDICATION LIST DOCUMENTED IN MEDICAL RECORD: ICD-10-PCS | Mod: CPTII,S$GLB,, | Performed by: OPTOMETRIST

## 2023-03-17 PROCEDURE — 1160F RVW MEDS BY RX/DR IN RCRD: CPT | Mod: CPTII,S$GLB,, | Performed by: OPTOMETRIST

## 2023-03-17 NOTE — PROGRESS NOTES
HPI    Pt wears readers with no problems.  Reports no problems today  Last edited by Arnulfo Blake, OD on 3/17/2023  8:04 AM.            Assessment /Plan     For exam results, see Encounter Report.    Nuclear sclerosis, bilateral    Presbyopia        1. Small cats--pt happy w otc readers.  Discussed could use otc +1.00 for distance  2. Family History of Glaucoma (dad).  Advised yearly exam   3. Ocular rosacea hx--see prev notes.  Pt doing well on otc SYSTANE BAL ATs QID for now      PLAN:    rtc 1 yr

## 2023-05-16 ENCOUNTER — TELEPHONE (OUTPATIENT)
Dept: INTERNAL MEDICINE | Facility: CLINIC | Age: 63
End: 2023-05-16
Payer: COMMERCIAL

## 2023-05-16 DIAGNOSIS — R10.9 ABDOMINAL PAIN, UNSPECIFIED ABDOMINAL LOCATION: Primary | ICD-10-CM

## 2023-05-16 NOTE — TELEPHONE ENCOUNTER
----- Message from Sue Henson sent at 5/16/2023 11:26 AM CDT -----  Contact: self [phone]  Pt requesting a call in regards to stomach issues requesting a call in regards to an referral for gastro.    Please call and advise

## 2023-05-26 ENCOUNTER — LAB VISIT (OUTPATIENT)
Dept: LAB | Facility: HOSPITAL | Age: 63
End: 2023-05-26
Attending: INTERNAL MEDICINE
Payer: COMMERCIAL

## 2023-05-26 ENCOUNTER — OFFICE VISIT (OUTPATIENT)
Dept: GASTROENTEROLOGY | Facility: CLINIC | Age: 63
End: 2023-05-26
Payer: COMMERCIAL

## 2023-05-26 VITALS
BODY MASS INDEX: 26.16 KG/M2 | SYSTOLIC BLOOD PRESSURE: 150 MMHG | HEIGHT: 67 IN | DIASTOLIC BLOOD PRESSURE: 91 MMHG | WEIGHT: 166.69 LBS | RESPIRATION RATE: 14 BRPM | HEART RATE: 79 BPM

## 2023-05-26 DIAGNOSIS — R10.13 DYSPEPSIA: ICD-10-CM

## 2023-05-26 DIAGNOSIS — K21.9 GASTROESOPHAGEAL REFLUX DISEASE, UNSPECIFIED WHETHER ESOPHAGITIS PRESENT: ICD-10-CM

## 2023-05-26 DIAGNOSIS — Z85.46 HISTORY OF PROSTATE CANCER: Primary | ICD-10-CM

## 2023-05-26 DIAGNOSIS — R10.9 ABDOMINAL PAIN, UNSPECIFIED ABDOMINAL LOCATION: ICD-10-CM

## 2023-05-26 DIAGNOSIS — Z80.7 FAMILY HISTORY OF NON-HODGKIN'S LYMPHOMA: ICD-10-CM

## 2023-05-26 DIAGNOSIS — Z85.46 HISTORY OF PROSTATE CANCER: ICD-10-CM

## 2023-05-26 LAB
25(OH)D3+25(OH)D2 SERPL-MCNC: 38 NG/ML (ref 30–96)
ALBUMIN SERPL BCP-MCNC: 4.6 G/DL (ref 3.5–5.2)
ALP SERPL-CCNC: 68 U/L (ref 55–135)
ALT SERPL W/O P-5'-P-CCNC: 20 U/L (ref 10–44)
ANION GAP SERPL CALC-SCNC: 10 MMOL/L (ref 8–16)
AST SERPL-CCNC: 20 U/L (ref 10–40)
BASOPHILS # BLD AUTO: 0.02 K/UL (ref 0–0.2)
BASOPHILS NFR BLD: 0.4 % (ref 0–1.9)
BILIRUB SERPL-MCNC: 1 MG/DL (ref 0.1–1)
BUN SERPL-MCNC: 15 MG/DL (ref 8–23)
CALCIUM SERPL-MCNC: 10.1 MG/DL (ref 8.7–10.5)
CHLORIDE SERPL-SCNC: 102 MMOL/L (ref 95–110)
CO2 SERPL-SCNC: 30 MMOL/L (ref 23–29)
CREAT SERPL-MCNC: 1 MG/DL (ref 0.5–1.4)
CREAT SERPL-MCNC: 1 MG/DL (ref 0.5–1.4)
DIFFERENTIAL METHOD: ABNORMAL
EOSINOPHIL # BLD AUTO: 0 K/UL (ref 0–0.5)
EOSINOPHIL NFR BLD: 0.9 % (ref 0–8)
ERYTHROCYTE [DISTWIDTH] IN BLOOD BY AUTOMATED COUNT: 12.4 % (ref 11.5–14.5)
EST. GFR  (NO RACE VARIABLE): >60 ML/MIN/1.73 M^2
EST. GFR  (NO RACE VARIABLE): >60 ML/MIN/1.73 M^2
FERRITIN SERPL-MCNC: 88 NG/ML (ref 20–300)
GLUCOSE SERPL-MCNC: 92 MG/DL (ref 70–110)
HAV IGG SER QL IA: NORMAL
HAV IGM SERPL QL IA: NORMAL
HBV CORE IGM SERPL QL IA: NORMAL
HBV SURFACE AG SERPL QL IA: NORMAL
HCT VFR BLD AUTO: 43.3 % (ref 40–54)
HCV AB SERPL QL IA: NORMAL
HGB BLD-MCNC: 13.9 G/DL (ref 14–18)
IGA SERPL-MCNC: 122 MG/DL (ref 40–350)
IMM GRANULOCYTES # BLD AUTO: 0.01 K/UL (ref 0–0.04)
IMM GRANULOCYTES NFR BLD AUTO: 0.2 % (ref 0–0.5)
IRON SERPL-MCNC: 87 UG/DL (ref 45–160)
LIPASE SERPL-CCNC: 29 U/L (ref 4–60)
LYMPHOCYTES # BLD AUTO: 0.8 K/UL (ref 1–4.8)
LYMPHOCYTES NFR BLD: 17.5 % (ref 18–48)
MCH RBC QN AUTO: 31 PG (ref 27–31)
MCHC RBC AUTO-ENTMCNC: 32.1 G/DL (ref 32–36)
MCV RBC AUTO: 96 FL (ref 82–98)
MONOCYTES # BLD AUTO: 0.4 K/UL (ref 0.3–1)
MONOCYTES NFR BLD: 8.1 % (ref 4–15)
NEUTROPHILS # BLD AUTO: 3.3 K/UL (ref 1.8–7.7)
NEUTROPHILS NFR BLD: 72.9 % (ref 38–73)
NRBC BLD-RTO: 0 /100 WBC
PLATELET # BLD AUTO: 196 K/UL (ref 150–450)
PMV BLD AUTO: 12.4 FL (ref 9.2–12.9)
POTASSIUM SERPL-SCNC: 3.5 MMOL/L (ref 3.5–5.1)
PROT SERPL-MCNC: 7.9 G/DL (ref 6–8.4)
RBC # BLD AUTO: 4.49 M/UL (ref 4.6–6.2)
SATURATED IRON: 18 % (ref 20–50)
SODIUM SERPL-SCNC: 142 MMOL/L (ref 136–145)
TOTAL IRON BINDING CAPACITY: 471 UG/DL (ref 250–450)
TRANSFERRIN SERPL-MCNC: 318 MG/DL (ref 200–375)
VIT B12 SERPL-MCNC: 536 PG/ML (ref 210–950)
WBC # BLD AUTO: 4.46 K/UL (ref 3.9–12.7)

## 2023-05-26 PROCEDURE — 82607 VITAMIN B-12: CPT | Performed by: INTERNAL MEDICINE

## 2023-05-26 PROCEDURE — 85025 COMPLETE CBC W/AUTO DIFF WBC: CPT | Performed by: INTERNAL MEDICINE

## 2023-05-26 PROCEDURE — 1159F MED LIST DOCD IN RCRD: CPT | Mod: CPTII,S$GLB,, | Performed by: INTERNAL MEDICINE

## 2023-05-26 PROCEDURE — 80053 COMPREHEN METABOLIC PANEL: CPT | Performed by: INTERNAL MEDICINE

## 2023-05-26 PROCEDURE — 86790 VIRUS ANTIBODY NOS: CPT | Performed by: INTERNAL MEDICINE

## 2023-05-26 PROCEDURE — 3008F PR BODY MASS INDEX (BMI) DOCUMENTED: ICD-10-PCS | Mod: CPTII,S$GLB,, | Performed by: INTERNAL MEDICINE

## 2023-05-26 PROCEDURE — 3077F SYST BP >= 140 MM HG: CPT | Mod: CPTII,S$GLB,, | Performed by: INTERNAL MEDICINE

## 2023-05-26 PROCEDURE — 99204 PR OFFICE/OUTPT VISIT, NEW, LEVL IV, 45-59 MIN: ICD-10-PCS | Mod: S$GLB,,, | Performed by: INTERNAL MEDICINE

## 2023-05-26 PROCEDURE — 80074 ACUTE HEPATITIS PANEL: CPT | Performed by: INTERNAL MEDICINE

## 2023-05-26 PROCEDURE — 99204 OFFICE O/P NEW MOD 45 MIN: CPT | Mod: S$GLB,,, | Performed by: INTERNAL MEDICINE

## 2023-05-26 PROCEDURE — 3080F DIAST BP >= 90 MM HG: CPT | Mod: CPTII,S$GLB,, | Performed by: INTERNAL MEDICINE

## 2023-05-26 PROCEDURE — 99999 PR PBB SHADOW E&M-EST. PATIENT-LVL IV: CPT | Mod: PBBFAC,,, | Performed by: INTERNAL MEDICINE

## 2023-05-26 PROCEDURE — 86706 HEP B SURFACE ANTIBODY: CPT | Performed by: INTERNAL MEDICINE

## 2023-05-26 PROCEDURE — 3008F BODY MASS INDEX DOCD: CPT | Mod: CPTII,S$GLB,, | Performed by: INTERNAL MEDICINE

## 2023-05-26 PROCEDURE — 82306 VITAMIN D 25 HYDROXY: CPT | Performed by: INTERNAL MEDICINE

## 2023-05-26 PROCEDURE — 3080F PR MOST RECENT DIASTOLIC BLOOD PRESSURE >= 90 MM HG: ICD-10-PCS | Mod: CPTII,S$GLB,, | Performed by: INTERNAL MEDICINE

## 2023-05-26 PROCEDURE — 82728 ASSAY OF FERRITIN: CPT | Performed by: INTERNAL MEDICINE

## 2023-05-26 PROCEDURE — 83690 ASSAY OF LIPASE: CPT | Performed by: INTERNAL MEDICINE

## 2023-05-26 PROCEDURE — 84466 ASSAY OF TRANSFERRIN: CPT | Performed by: INTERNAL MEDICINE

## 2023-05-26 PROCEDURE — 1159F PR MEDICATION LIST DOCUMENTED IN MEDICAL RECORD: ICD-10-PCS | Mod: CPTII,S$GLB,, | Performed by: INTERNAL MEDICINE

## 2023-05-26 PROCEDURE — 82784 ASSAY IGA/IGD/IGG/IGM EACH: CPT | Performed by: INTERNAL MEDICINE

## 2023-05-26 PROCEDURE — 99999 PR PBB SHADOW E&M-EST. PATIENT-LVL IV: ICD-10-PCS | Mod: PBBFAC,,, | Performed by: INTERNAL MEDICINE

## 2023-05-26 PROCEDURE — 81404 MOPATH PROCEDURE LEVEL 5: CPT | Performed by: INTERNAL MEDICINE

## 2023-05-26 PROCEDURE — 86364 TISS TRNSGLTMNASE EA IG CLAS: CPT | Performed by: INTERNAL MEDICINE

## 2023-05-26 PROCEDURE — 3077F PR MOST RECENT SYSTOLIC BLOOD PRESSURE >= 140 MM HG: ICD-10-PCS | Mod: CPTII,S$GLB,, | Performed by: INTERNAL MEDICINE

## 2023-05-26 NOTE — PROGRESS NOTES
Bere- Per Jean - Return GI clinic 8 weeks for follow-up okay for telemedicine video visit. Please contact patient to schedule this appt. Thanks

## 2023-05-26 NOTE — PROGRESS NOTES
[facility] Consultation Note    Reason for Consult:  The primary encounter diagnosis was History of prostate cancer. Diagnoses of Abdominal pain, unspecified abdominal location, Family history of non-Hodgkin's lymphoma, Gastroesophageal reflux disease, unspecified whether esophagitis present, and Dyspepsia were also pertinent to this visit.    PCP:   Arnulfo Hammonds   [address] [facility] / [place] [id number]    Referring MD:  Arnulfo Hammonds Do  [address] [facility]  [place] [id number]    Initial History of Present Illness (HPI):  This is a 63 y.o. male here for evaluation of New onset abdominal discomfort sometimes periumbilical sometimes  burning sensation does not radiate to the back occasional nausea no vomiting occasional heartburn symptoms but not severe no dysphagia no odynophagia no weight loss no early satiety no change in bowel habits colonoscopy is up-to-date last 1 was in 2021 no history of gastric ulcers no family history of esophageal or stomach cancer nobody with pancreatitis or pancreatic cancer nobody with colon cancer or advanced colon adenomas polyps nobody with ovarian uterine cancer nobody with kidney or bladder cancer nobody with small-bowel cancer nobody with liver disease did have a dad who was diagnosed with non-Hodgkin's lymphoma it 87 years of age his brother had non-Hodgkin's lymphoma at 65 years of age he also had his mother's brother with non-Hodgkin's lymphoma patient was diagnosed with prostate cancer treated with surgery and then PSA was going up slightly in started radiation therapy no blood in his stool he quit smoking cigarettes about 11 years ago rarely drinks alcohol no history of pancreatitis nobody with pancreatic cancer in the family no history of abdominal trauma no unintentional weight loss no change in bowel habits averages 1-2 bowel movements a day.  On July of 2021 he had a colonoscopy a diminutive polyp in the rectum was  told to have another 1 in 5 years which would be July 2026.  Patient seen ENT they thought he may have laryngeal pharyngeal reflux he talk to his GI doctor said he did not need to be on a PPI patient has some dyspepsia symptoms an EGD with esophageal Bravo pH probe study 96 hours off PPIs off H2 blockers for at least 4 weeks would be ideal patient has not been on PPIs or H2 blockers so we can set him up for EGD with esophageal Bravo pH probe next available.  Will recommend lab work today and CT scan abdomen pelvis for further evaluation of abdominal pain.    Abdominal pain -   As above  Reflux -  as above  Dysphagia - no   Bowel habits - normal  GI bleeding - none  NSAID usage - none    Interval HPI 05/26/2023:  The patient's last visit with me was on Visit date not found.      ROS:  Constitutional: No fevers, chills, No weight loss  ENT:   as  aboveheartburn no dysphagia no odynophagia no hoarseness  CV: No chest pain, no palpitation  Pulm: No cough, No shortness of breath, no wheezing  Ophtho: No vision changes  GI: see HPI  Derm: No rash, no itching  Heme: No lymphadenopathy, No easy bruising  MSK: No significant arthritis  : No dysuria, No hematuria  Endo: No hot or cold intolerance  Neuro: No syncope, No seizure, no strokes  Psych: No uncontrolled anxiety, No uncontrolled depression    Medical History:  has a past medical history of Acquired spondylolisthesis (8/29/2022), Allergy, Anal fissure (6/8/2016), Displacement of cervical intervertebral disc (8/29/2022), History of prostatitis, Hypertension, Iliotibial band syndrome affecting lower leg (6/24/2015), and Thoracic facet syndrome (8/29/2022).    Surgical History:  has a past surgical history that includes Prostate biopsy; Robot-assisted laparoscopic prostatectomy using da Luis Xi (N/A, 11/25/2019); Colonoscopy; and Upper gastrointestinal endoscopy.    Family History: family history includes Blindness in an other family member; Cancer in his paternal  "grandfather; Cataracts in his father and mother; Colon cancer in his paternal uncle; Diabetes in his maternal grandmother and paternal grandfather; Glaucoma in his father; Heart disease in his maternal grandmother; Hypertension in his father and mother..     Social History:  reports that he has quit smoking. His smoking use included cigarettes. He started smoking about 9 years ago. He has never used smokeless tobacco. He reports current alcohol use. He reports that he does not use drugs.    Review of patient's allergies indicates:   Allergen Reactions    Codeine Nausea Only    Vicodin [hydrocodone-acetaminophen]      unknown       Medication List with Changes/Refills   Current Medications    CETIRIZINE (ZYRTEC) 10 MG TABLET    Take 10 mg by mouth once daily.    FLUTICASONE PROPIONATE (FLONASE) 50 MCG/ACTUATION NASAL SPRAY    2 sprays (100 mcg total) by Each Nostril route once daily.    LEVOCETIRIZINE (XYZAL) 5 MG TABLET    Take 1 tablet (5 mg total) by mouth every evening.    LOSARTAN-HYDROCHLOROTHIAZIDE 50-12.5 MG (HYZAAR) 50-12.5 MG PER TABLET    Take 1 tablet by mouth once daily.    SILDENAFIL (VIAGRA) 100 MG TABLET    Take 1 tablet (100 mg total) by mouth daily as needed for Erectile Dysfunction.         Objective Findings:    Vital Signs:  Ht 5' 7" (1.702 m)   Wt 75.6 kg (166 lb 10.7 oz)   BMI 26.10 kg/m²   Body mass index is 26.1 kg/m².    Physical Exam:  General Appearance: Well appearing in no acute distress  Eyes:    No scleral icterus  ENT:  No lesions or masses   Lungs: CTA bilaterally, no wheezes, no rhonchi, no rales  Heart:  S1, S2 normal, no murmurs heard  Abdomen:  Non distended, soft, no guarding, no rebound,  mild periumbilical tenderness, no appreciated ascites, no bruits, no hepatosplenomegaly,  No CVA tenderness, no appreciated hernias, no Pritchard sign, no McBurney point tenderness  Musculoskeletal:  No major joint deformities  Skin: No petechiae or rash on exposed skin areas  Neurologic:  " Alert and oriented x4  Psychiatric:  Normal speech mentation and affect    Labs:  Lab Results   Component Value Date    WBC 3.86 (L) 08/19/2022    HGB 13.3 (L) 08/19/2022    HCT 41.8 08/19/2022     08/19/2022    CHOL 244 (H) 08/19/2022    TRIG 104 08/19/2022    HDL 54 08/19/2022    ALT 27 08/19/2022    AST 21 08/19/2022     08/19/2022    K 4.3 08/19/2022     08/19/2022    CREATININE 1.2 08/19/2022    BUN 17 08/19/2022    CO2 29 08/19/2022    TSH 2.868 08/19/2022    PSA 0.16 08/14/2020               Medical Decision Making:  Lab work reviewed  Prior colonoscopy report reviewed  Lab work talk given  History obtained from patient and his wife in room today  CT scan talk given EGD with esophageal Bravo pH probe talk given  Avoidance of PPI and H2 blockers until EGD with Bravo pH probe study is complete talk given      Assessment:  1. History of prostate cancer    2. Abdominal pain, unspecified abdominal location    3. Family history of non-Hodgkin's lymphoma    4. Gastroesophageal reflux disease, unspecified whether esophagitis present    5. Dyspepsia         Recommendations:  1. Second floor lab work today  2. CT enterography  3. Referral to endoscopy schedulers for EGD with 96 hour esophageal Bravo pH probe study done off PPIs and H2 blockers for at least 4 weeks patient has not been on it at all so he can be scheduled next available.  4. History of colon polyp up-to-date on his screening colonoscopy next 1 due from his GI doctor July 2026 5 years from his last  5. History of prostate cancer status post surgical resection and now radiation treatment  6. Return to GI clinic 8 weeks for follow-up okay for telemedicine video visit      Follow up in about 8 weeks (around 7/21/2023).      Order summary:  Orders Placed This Encounter    CT Enterography Abd_Pelvis With Contrast    Comprehensive Metabolic Panel    CBC Auto Differential    TISSUE TRANSGLUTAMINASE (TTG), IGA    IgA    Lipase    Vitamin D     Vitamin B12    Iron and TIBC    Ferritin    UDP-Glucuronosyl Transferase 1A1 (UGT1 A1)    Creatinine, serum         Thank you so much for allowing me to participate in the care of Nilton Jean MD    DISCLAIMER: This note was prepared with [employer] voice recognition transcription software. Garbled syntax, mangled or inadvertent pronouns, and other bizarre constructions may be attributed to that software system. While efforts were made to correct any mistakes made by this voice recognition program, some errors and/or omissions may remain in the note that were missed when the note was originally created.

## 2023-05-26 NOTE — PATIENT INSTRUCTIONS
"Procedure:  EGD with 96 hour esophageal Bravo pH probe study done off all acid suppressive medicine for at least 4 weeks patient is not on it so he can go next available    Diagnosis: GERD, dyspepsia    Procedure Timin-6 weeks    *If within 4 weeks selected, please marla as high priority*    *If greater than 12 weeks, please select "5-12 weeks" and delay sending until 2 months prior to requested date*    Provider: Myself    Location: [address]    Additional Scheduling Information: No scheduling concerns    Prep Specifications:Standard prep    Have you attached a patient to this message: yes   "

## 2023-05-26 NOTE — Clinical Note
Dashion -  please schedule Peter for 2nd floor lab work today.  Orders been placed  Please  schedule patient for CT enterography across the street at the imaging center  Please have patient see endoscopy schedulers to schedule EGD with 96 hour esophageal Bravo pH probe study off PPIs and H2 blockers referral placed  Return GI clinic 8 weeks for follow-up okay for telemedicine video visit   Thank you

## 2023-05-26 NOTE — Clinical Note
"Procedure:  EGD with 96 hour esophageal Bravo pH probe study done off all acid suppressive medicine for at least 4 weeks patient is not on it so he can go next available  Diagnosis: GERD, dyspepsia  Procedure Timin-6 weeks  *If within 4 weeks selected, please marla as high priority*  *If greater than 12 weeks, please select "5-12 weeks" and delay sending until 2 months prior to requested date*  Provider: Myself  Location: [address]  Additional Scheduling Information: No scheduling concerns  Prep Specifications:Standard prep  Have you attached a patient to this message: yes "

## 2023-05-29 ENCOUNTER — HOSPITAL ENCOUNTER (OUTPATIENT)
Dept: RADIOLOGY | Facility: OTHER | Age: 63
Discharge: HOME | End: 2023-05-29
Attending: INTERNAL MEDICINE
Payer: COMMERCIAL

## 2023-05-29 DIAGNOSIS — Z80.7 FAMILY HISTORY OF NON-HODGKIN'S LYMPHOMA: ICD-10-CM

## 2023-05-29 DIAGNOSIS — Z85.46 HISTORY OF PROSTATE CANCER: ICD-10-CM

## 2023-05-29 DIAGNOSIS — R10.9 ABDOMINAL PAIN, UNSPECIFIED ABDOMINAL LOCATION: ICD-10-CM

## 2023-05-29 PROCEDURE — A9698 NON-RAD CONTRAST MATERIALNOC: HCPCS | Performed by: INTERNAL MEDICINE

## 2023-05-29 PROCEDURE — 74177 CT ABD & PELVIS W/CONTRAST: CPT | Mod: TC

## 2023-05-29 PROCEDURE — 74177 CT ABDOMEN PELVIS WITH CONTRAST: ICD-10-PCS | Mod: 26,,, | Performed by: RADIOLOGY

## 2023-05-29 PROCEDURE — 74177 CT ABD & PELVIS W/CONTRAST: CPT | Mod: 26,,, | Performed by: RADIOLOGY

## 2023-05-29 PROCEDURE — 25500020 PHARM REV CODE 255: Performed by: INTERNAL MEDICINE

## 2023-05-29 RX ADMIN — IOHEXOL 150 ML: 350 INJECTION, SOLUTION INTRAVENOUS at 04:05

## 2023-05-29 RX ADMIN — BARIUM SULFATE 1350 ML: 20 SUSPENSION ORAL at 03:05

## 2023-05-30 LAB
HBV SURFACE AB SER QL IA: NEGATIVE
HBV SURFACE AB SERPL IA-ACNC: <3 MIU/ML

## 2023-05-31 ENCOUNTER — PATIENT MESSAGE (OUTPATIENT)
Dept: GASTROENTEROLOGY | Facility: CLINIC | Age: 63
End: 2023-05-31
Payer: COMMERCIAL

## 2023-05-31 DIAGNOSIS — Z23 ENCOUNTER FOR VACCINATION: Primary | ICD-10-CM

## 2023-05-31 DIAGNOSIS — D50.9 IRON DEFICIENCY ANEMIA, UNSPECIFIED IRON DEFICIENCY ANEMIA TYPE: ICD-10-CM

## 2023-05-31 LAB — TTG IGA SER-ACNC: <0.2 U/ML

## 2023-05-31 RX ORDER — FERROUS GLUCONATE 324(38)MG
324 TABLET ORAL
Qty: 90 TABLET | Refills: 1 | Status: SHIPPED | OUTPATIENT
Start: 2023-05-31 | End: 2023-08-27 | Stop reason: SDUPTHER

## 2023-05-31 NOTE — PROGRESS NOTES
"[facility] team - please tell patient that they are iron deficient and anemic and recommend that they take ferrous gluconate one 324mg pill once daily for next 3 months.    [facility] team -  Please order repeat fasting Hemoglobin, Iron/TIBC, and Ferritin in 8 weeks - Orders placed.     [facility] team - please tell patient that their Hepatitis A, B and C labs are negative but they have "No" immunity to them either.     There is currently No vaccination yet for Hepatitis C.    There is vaccinations for Hepatitis A and B,  and Recommend the Hepatitis A and B vaccination series.       Hepatitis A vaccination series is a 2 part vaccine series - Day 1, and then again in 6-months from first one.    Hepatitis B vaccination series is a 2 part vaccine series - Day 1, and then again in 1-month from the first one.    Orders were  placed. "

## 2023-06-01 ENCOUNTER — TELEPHONE (OUTPATIENT)
Dept: ENDOSCOPY | Facility: HOSPITAL | Age: 63
End: 2023-06-01
Payer: COMMERCIAL

## 2023-06-01 VITALS — BODY MASS INDEX: 26.21 KG/M2 | HEIGHT: 67 IN | WEIGHT: 167 LBS

## 2023-06-01 DIAGNOSIS — K21.9 GASTROESOPHAGEAL REFLUX DISEASE, UNSPECIFIED WHETHER ESOPHAGITIS PRESENT: Primary | ICD-10-CM

## 2023-06-01 DIAGNOSIS — R10.13 DYSPEPSIA: ICD-10-CM

## 2023-06-01 LAB
MOL DX INTERP BLD/T QL: NORMAL
REF LAB TEST METHOD: NORMAL
TEST PERFORMANCE INFO SPEC: NORMAL
UGT1A1 ADDITIONAL INFORMATION: NORMAL
UGT1A1 FULL GENE SEQUENCE RESULT: NORMAL
UGT1A1 INTERPRETATION: NORMAL
UGT1A1 REVIEWED BY: NORMAL
UGT1A1 TA REPEAT RESULT: NORMAL

## 2023-06-01 NOTE — TELEPHONE ENCOUNTER
"----- Message from Almas Jean MD sent at 2023  3:40 PM CDT -----  Procedure:  EGD with 96 hour esophageal Bravo pH probe study done off all acid suppressive medicine for at least 4 weeks patient is not on it so he can go next available    Diagnosis: GERD, dyspepsia    Procedure Timin-6 weeks    #If within 4 weeks selected, please marla as high priority#    #If greater than 12 weeks, please select "5-12 weeks" and delay sending until 2 months prior to requested date#    Provider: Myself    Location: [address]    Additional Scheduling Information: No scheduling concerns    Prep Specifications:Standard prep    Have you attached a patient to this message: yes   "

## 2023-06-01 NOTE — PROGRESS NOTES
"Peter your blood work confirms the diagnosis of Gilbert syndrome a benign condition about 7% of the population is born with where your bilirubin rises slightly when your fasting ill or stressed out.    UGT1A1 Result Summary VARIANT(S) DETECTED   UGT1A1 TA Repeat Result TA7/TA7 (Homozygous *28)   UGT1A1 Full Gene Sequence Result SEE BELOW   Comment: No additional reportable variants were detected in the   UGT1A1 gene by sequencing.   UGT1A1 Interpretation SEE BELOW   Comment: This result has implications for bilirubin metabolism and   drug metabolism.     Homozygosity for the TA7 promoter variant (also known as   *28) is associated with reduced UGT1A1 activity (30% to 50%   of normal) (1). This result is supportive of a diagnosis of   Gilbert syndrome.          GILBERT SYNDROME OVERVIEW  Gilbert syndrome, also known as constitutional hepatic dysfunction or familial nonhemolytic jaundice, is an inherited disorder of the liver that results in an overabundance of a substance known as bilirubin. While some people with Gilbert syndrome develop yellowing of the skin or eyes, most people have no symptoms at all. Gilbert syndrome is not dangerous and does not cause long-term problems, so it is not necessary to treat Gilbert syndrome.    WHAT IS BILIRUBIN?  Bilirubin is normally present in the blood in small amounts. It is a by-product of the breakdown of hemoglobin in old red blood cells, and it is usually converted by the liver into a form that can be excreted from the body in stool. Abnormalities in this process can cause blood levels of bilirubin to rise above normal.  People with Gilbert syndrome have an inherited abnormality that causes reduced production of an enzyme involved in processing bilirubin. (See "Gilbert syndrome and unconjugated hyperbilirubinemia due to bilirubin overproduction".)  Every affected person has two copies of the abnormal gene responsible for Gilbert syndrome. Because over half of the people in " "the general population have at least one abnormal copy of the gene, inheriting two abnormal copies (one from the mother and one from the father) is not uncommon. Interestingly, people who have only one copy may have slightly higher levels of unconjugated bilirubin but do not have Gilbert syndrome. For reasons that are not entirely understood, not all people who have two copies of the abnormal gene develop blood bilirubin levels that are high enough for the diagnosis of Gilbert syndrome.    GILBERT SYNDROME SYMPTOMS  Most patients with Gilbert syndrome have no symptoms. The disorder is frequently diagnosed incidentally when a lab test done for another reason (such as a life insurance examination) shows an abnormally high level of unconjugated bilirubin.  When the level of unconjugated bilirubin rises beyond a certain point, the bilirubin pigment begins to discolor the whites of the eyes (making them appear light yellow). With even higher levels, the skin may also become yellow (jaundice). This potentially alarming appearance makes many people with Gilbert syndrome see their health care provider.  Levels of bilirubin can fluctuate in people with Gilbert syndrome. They may be highest during an infection (such as the flu), following periods of fasting, and during menstrual periods in some women. In addition, newborns with Gilbert syndrome may have higher levels of bilirubin and more persistent jaundice than newborns without. (See "Patient education: Jaundice in  infants (Beyond the Basics)".)     Gilbert syndrome may also become apparent when an affected person takes certain drugs that require the enzyme involved in bilirubin processing in the liver. A cancer drug called irinotecan is one example.    GILBERT SYNDROME DIAGNOSIS  The diagnosis of Gilbert syndrome is suspected in people who have persistently slightly elevated levels of unconjugated bilirubin without any other apparent cause. A clinician may order " several blood tests and possibly an ultrasound of the liver to make sure that there is no other cause of the high bilirubin levels.  Genetic testing can also confirm the diagnosis; however, genetic testing is not widely available and is generally not required.    GILBERT SYNDROME TREATMENT  No specific treatment is required for people with Gilbert syndrome. However, there is an increased risk of side effects from certain drugs that are broken down by the liver (such as acetaminophen and the chemotherapy drug, irinotecan). Check with a health care provider before taking any new medications. Do not take more than the recommended amount of acetaminophen (Tylenol).

## 2023-06-01 NOTE — TELEPHONE ENCOUNTER
"Almas Jean MD  [facility]  Procedure:  EGD with 96 hour esophageal Bravo pH probe study done off all acid suppressive medicine for at least 4 weeks patient is not on it so he can go next available     Diagnosis: GERD, dyspepsia     Procedure Timin-6 weeks     *If within 4 weeks selected, please marla as high priority*     *If greater than 12 weeks, please select "5-12 weeks" and delay sending until 2 months prior to requested date*     Provider: Myself     Location: [address]     Additional Scheduling Information: No scheduling concerns     Prep Specifications:Standard prep     Have you attached a patient to this message: yes   "

## 2023-06-01 NOTE — TELEPHONE ENCOUNTER
"----- Message from Almas Jean MD sent at 2023  3:40 PM CDT -----  Procedure:  EGD with 96 hour esophageal Bravo pH probe study done off all acid suppressive medicine for at least 4 weeks patient is not on it so he can go next available    Diagnosis: GERD, dyspepsia    Procedure Timin-6 weeks    #If within 4 weeks selected, please marla as high priority#    #If greater than 12 weeks, please select "5-12 weeks" and delay sending until 2 months prior to requested date#    Provider: Myself    Location: [address]    Additional Scheduling Information: No scheduling concerns    Prep Specifications:Standard prep    Have you attached a patient to this message: yes   "

## 2023-06-04 DIAGNOSIS — K76.89 LIVER CYST: Primary | ICD-10-CM

## 2023-06-04 NOTE — PROGRESS NOTES
GI MA team please schedule Nilton for a follow-up ultrasound of his liver in 6 months which would be December 2023 for follow-up of small liver cyst orders placed.    Peter your CT scan overall looks good no concerning findings for your symptoms    Impression:     No acute abdominal process or evidence of metastatic disease.    The liver is normal in size.  No solid mass or biliary ductal dilatation.  Tiny hypodensity in segment 5 too small to accurately characterize but almost certainly likely to represent a small cyst.  The gallbladder is unremarkable.           Electronically signed by: Blas Srivastava MD  Date:                                            05/29/2023

## 2023-06-20 ENCOUNTER — PATIENT MESSAGE (OUTPATIENT)
Dept: ENDOSCOPY | Facility: HOSPITAL | Age: 63
End: 2023-06-20
Payer: COMMERCIAL

## 2023-06-22 ENCOUNTER — OFFICE VISIT (OUTPATIENT)
Dept: UROLOGY | Facility: CLINIC | Age: 63
End: 2023-06-22
Payer: COMMERCIAL

## 2023-06-22 VITALS
BODY MASS INDEX: 26.18 KG/M2 | HEART RATE: 76 BPM | DIASTOLIC BLOOD PRESSURE: 86 MMHG | WEIGHT: 166.81 LBS | SYSTOLIC BLOOD PRESSURE: 130 MMHG | HEIGHT: 67 IN

## 2023-06-22 DIAGNOSIS — C61 PROSTATE CANCER: Primary | ICD-10-CM

## 2023-06-22 PROCEDURE — 3079F PR MOST RECENT DIASTOLIC BLOOD PRESSURE 80-89 MM HG: ICD-10-PCS | Mod: CPTII,S$GLB,, | Performed by: UROLOGY

## 2023-06-22 PROCEDURE — 99999 PR PBB SHADOW E&M-EST. PATIENT-LVL III: CPT | Mod: PBBFAC,,, | Performed by: UROLOGY

## 2023-06-22 PROCEDURE — 99214 OFFICE O/P EST MOD 30 MIN: CPT | Mod: S$GLB,,, | Performed by: UROLOGY

## 2023-06-22 PROCEDURE — 99999 PR PBB SHADOW E&M-EST. PATIENT-LVL III: ICD-10-PCS | Mod: PBBFAC,,, | Performed by: UROLOGY

## 2023-06-22 PROCEDURE — 3079F DIAST BP 80-89 MM HG: CPT | Mod: CPTII,S$GLB,, | Performed by: UROLOGY

## 2023-06-22 PROCEDURE — 3008F PR BODY MASS INDEX (BMI) DOCUMENTED: ICD-10-PCS | Mod: CPTII,S$GLB,, | Performed by: UROLOGY

## 2023-06-22 PROCEDURE — 3008F BODY MASS INDEX DOCD: CPT | Mod: CPTII,S$GLB,, | Performed by: UROLOGY

## 2023-06-22 PROCEDURE — 3075F SYST BP GE 130 - 139MM HG: CPT | Mod: CPTII,S$GLB,, | Performed by: UROLOGY

## 2023-06-22 PROCEDURE — 1159F MED LIST DOCD IN RCRD: CPT | Mod: CPTII,S$GLB,, | Performed by: UROLOGY

## 2023-06-22 PROCEDURE — 3075F PR MOST RECENT SYSTOLIC BLOOD PRESS GE 130-139MM HG: ICD-10-PCS | Mod: CPTII,S$GLB,, | Performed by: UROLOGY

## 2023-06-22 PROCEDURE — 99214 PR OFFICE/OUTPT VISIT, EST, LEVL IV, 30-39 MIN: ICD-10-PCS | Mod: S$GLB,,, | Performed by: UROLOGY

## 2023-06-22 PROCEDURE — 1159F PR MEDICATION LIST DOCUMENTED IN MEDICAL RECORD: ICD-10-PCS | Mod: CPTII,S$GLB,, | Performed by: UROLOGY

## 2023-06-22 NOTE — PROGRESS NOTES
Clinic Note  6/22/2023      Subjective:         Chief Complaint:   HPI  Nilton Chin is a 63 y.o. male diagnosed with prostate cancer. Consult from Dr. Lopez.  Here with wife Maisha and daughter.  Negative family history.  [place] [employer].  robotic assisted laparoscopic prostatectomy 11/25/2019- [id number], Ulises 3+4.  Continent and pad free. Doing Kegels. Continent and pad free since week 1.  Used no opioids used post op.     Stage-T2  PSA 4.4  Volume- 16 ccs (MRI)  MRI- 3.2 cm RPZ PI-RADS 5 lesion. Negative extra prostatic extension, NVB, Bladder, seminal vesicle, nodes.  Biopsy- 10/4 Cochran 8 right middle 2/2 60%; Cochran 3+4 left apex 2/2 50%, right base 1/1 60%, right apex 2/2 60%; Cochran 6 2/2 50%.  ADELE score- 5  NCCN- high risk     6/23/2022- completed salvage EBRT 4/21/2021. PSA <0.01.  Reports urinary urgency. Denies hematuria, dysuria. Good stream. Requesting pelvic floor PT.    6/22/2023-PSA in December <0.01.  Denies hematuria, unintended weight loss, urinary difficulties.      Lab Results   Component Value Date    PSA 0.16 08/14/2020    PSA 4.4 (H) 08/14/2019    PSA 3.5 08/08/2018    PSA 3.3 08/08/2017    PSA 2.3 06/02/2016    PSA 0.80 11/05/2012    PSA 2.26 09/27/2012    PSA 1.1 07/15/2011    PSA 0.95 10/29/2009    PSA 0.74 10/08/2008    PSADIAG <0.01 12/15/2022    PSADIAG <0.01 06/20/2022    PSADIAG <0.01 12/15/2021    PSADIAG <0.01 07/16/2021    PSADIAG 0.20 11/18/2020    PSADIAG 0.14 07/14/2020    PSADIAG 0.02 01/14/2020    PSADIAG 3.0 11/07/2018    PSADIAG 2.8 11/01/2017    PSADIAG 2.3 10/26/2016      Past Medical History:   Diagnosis Date    Acquired spondylolisthesis 8/29/2022    Allergy     Anal fissure 6/8/2016    Displacement of cervical intervertebral disc 8/29/2022    History of prostatitis     Hypertension     Iliotibial band syndrome affecting lower leg 6/24/2015    Thoracic facet syndrome 8/29/2022     Family History   Problem Relation Age of Onset    Cataracts  "Mother     Hypertension Mother     Cataracts Father     Glaucoma Father     Hypertension Father     Colon cancer Paternal Uncle     Diabetes Maternal Grandmother     Heart disease Maternal Grandmother     Cancer Paternal Grandfather         lung/prostate    Diabetes Paternal Grandfather     Blindness Other     Amblyopia Neg Hx     Strabismus Neg Hx     Retinal detachment Neg Hx     Macular degeneration Neg Hx     Stroke Neg Hx     Melanoma Neg Hx     Esophageal cancer Neg Hx      Social History     Socioeconomic History    Marital status:     Number of children: 3   Occupational History    Occupation:    Tobacco Use    Smoking status: Former     Years: 35.00     Types: Cigarettes     Start date: 6/24/2013    Smokeless tobacco: Never    Tobacco comments:     6 cig a day   Substance and Sexual Activity    Alcohol use: Yes     Comment: socially/ not weekly    Drug use: No    Sexual activity: Yes     Partners: Female     Past Surgical History:   Procedure Laterality Date    COLONOSCOPY      PROSTATE BIOPSY      ROBOT-ASSISTED LAPAROSCOPIC PROSTATECTOMY USING DA EMELY XI N/A 11/25/2019    Procedure: XI ROBOTIC PROSTATECTOMY;  Surgeon: Jose Carlos Lawrence MD;  Location: [facility] OR [address] [place] [address];  Service: Urology;  Laterality: N/A;  3hrs gen with regional     UPPER GASTROINTESTINAL ENDOSCOPY       Patient Active Problem List   Diagnosis    Cervical radiculopathy    Hypertension    Other hyperlipidemia    Prostate cancer    Erectile dysfunction following radical prostatectomy    Urgency of urination    Pelvic floor dysfunction    Displacement of lumbar intervertebral disc without myelopathy     Review of Systems    See HPI  Objective:      There were no vitals taken for this visit.  Estimated body mass index is 26.16 kg/m² as calculated from the following:    Height as of 6/1/23: 5' 7" (1.702 m).    Weight as of 6/1/23: 75.8 kg (167 lb).  Physical Exam  Constitutional:       General: He is not in acute " distress.     Appearance: Normal appearance. He is not ill-appearing.   HENT:      Head: Normocephalic and atraumatic.      Mouth/Throat:      Mouth: Mucous membranes are moist.   Eyes:      Extraocular Movements: Extraocular movements intact.   Cardiovascular:      Rate and Rhythm: Normal rate.   Pulmonary:      Effort: Pulmonary effort is normal.   Abdominal:      Palpations: Abdomen is soft.   Musculoskeletal:      Cervical back: Normal range of motion.   Skin:     General: Skin is warm and dry.   Neurological:      Mental Status: He is alert and oriented to person, place, and time.   Psychiatric:         Mood and Affect: Mood normal.         Behavior: Behavior normal.       Assessment and Plan:           Problem List Items Addressed This Visit       Prostate cancer - Primary       Follow up:   PSA 6 months (ordered by Dr. Tesfaye), clinic visit 1 year.  He will see Dr. Tesfaye in 6 months.    Jose Carlos Lawrence

## 2023-06-23 ENCOUNTER — PATIENT MESSAGE (OUTPATIENT)
Dept: UROLOGY | Facility: CLINIC | Age: 63
End: 2023-06-23
Payer: COMMERCIAL

## 2023-06-27 ENCOUNTER — ANESTHESIA (OUTPATIENT)
Dept: ENDOSCOPY | Facility: HOSPITAL | Age: 63
End: 2023-06-27
Payer: COMMERCIAL

## 2023-06-27 ENCOUNTER — ANESTHESIA EVENT (OUTPATIENT)
Dept: ENDOSCOPY | Facility: HOSPITAL | Age: 63
End: 2023-06-27
Payer: COMMERCIAL

## 2023-06-27 ENCOUNTER — HOSPITAL ENCOUNTER (OUTPATIENT)
Facility: HOSPITAL | Age: 63
Discharge: HOME | End: 2023-06-27
Attending: INTERNAL MEDICINE | Admitting: INTERNAL MEDICINE
Payer: COMMERCIAL

## 2023-06-27 VITALS
DIASTOLIC BLOOD PRESSURE: 80 MMHG | HEART RATE: 70 BPM | HEIGHT: 67 IN | SYSTOLIC BLOOD PRESSURE: 157 MMHG | OXYGEN SATURATION: 100 % | TEMPERATURE: 98 F | RESPIRATION RATE: 18 BRPM | WEIGHT: 166 LBS | BODY MASS INDEX: 26.06 KG/M2

## 2023-06-27 DIAGNOSIS — K21.9 GERD (GASTROESOPHAGEAL REFLUX DISEASE): ICD-10-CM

## 2023-06-27 PROCEDURE — 88342 CHG IMMUNOCYTOCHEMISTRY: ICD-10-PCS | Mod: 26,,, | Performed by: PATHOLOGY

## 2023-06-27 PROCEDURE — 88342 IMHCHEM/IMCYTCHM 1ST ANTB: CPT | Mod: 26,,, | Performed by: PATHOLOGY

## 2023-06-27 PROCEDURE — E9220 PRA ENDO ANESTHESIA: ICD-10-PCS | Mod: ,,, | Performed by: NURSE ANESTHETIST, CERTIFIED REGISTERED

## 2023-06-27 PROCEDURE — 37000009 HC ANESTHESIA EA ADD 15 MINS: Performed by: INTERNAL MEDICINE

## 2023-06-27 PROCEDURE — 25000003 PHARM REV CODE 250: Performed by: NURSE ANESTHETIST, CERTIFIED REGISTERED

## 2023-06-27 PROCEDURE — 43239 EGD BIOPSY SINGLE/MULTIPLE: CPT | Mod: ,,, | Performed by: INTERNAL MEDICINE

## 2023-06-27 PROCEDURE — 88305 TISSUE EXAM BY PATHOLOGIST: CPT | Mod: 59 | Performed by: PATHOLOGY

## 2023-06-27 PROCEDURE — 43239 PR EGD, FLEX, W/BIOPSY, SGL/MULTI: ICD-10-PCS | Mod: ,,, | Performed by: INTERNAL MEDICINE

## 2023-06-27 PROCEDURE — 27201012 HC FORCEPS, HOT/COLD, DISP: Performed by: INTERNAL MEDICINE

## 2023-06-27 PROCEDURE — 88342 IMHCHEM/IMCYTCHM 1ST ANTB: CPT | Performed by: PATHOLOGY

## 2023-06-27 PROCEDURE — E9220 PRA ENDO ANESTHESIA: HCPCS | Mod: ,,, | Performed by: NURSE ANESTHETIST, CERTIFIED REGISTERED

## 2023-06-27 PROCEDURE — 37000008 HC ANESTHESIA 1ST 15 MINUTES: Performed by: INTERNAL MEDICINE

## 2023-06-27 PROCEDURE — 63600175 PHARM REV CODE 636 W HCPCS: Performed by: NURSE ANESTHETIST, CERTIFIED REGISTERED

## 2023-06-27 PROCEDURE — 91035 G-ESOPH REFLX TST W/ELECTROD: CPT | Mod: TC | Performed by: INTERNAL MEDICINE

## 2023-06-27 PROCEDURE — 43239 EGD BIOPSY SINGLE/MULTIPLE: CPT | Performed by: INTERNAL MEDICINE

## 2023-06-27 PROCEDURE — 88305 TISSUE EXAM BY PATHOLOGIST: CPT | Mod: 26,,, | Performed by: PATHOLOGY

## 2023-06-27 PROCEDURE — 88305 TISSUE EXAM BY PATHOLOGIST: ICD-10-PCS | Mod: 26,,, | Performed by: PATHOLOGY

## 2023-06-27 PROCEDURE — 27200942: Performed by: INTERNAL MEDICINE

## 2023-06-27 RX ORDER — PROPOFOL 10 MG/ML
VIAL (ML) INTRAVENOUS
Status: DISCONTINUED | OUTPATIENT
Start: 2023-06-27 | End: 2023-06-27

## 2023-06-27 RX ORDER — PROPOFOL 10 MG/ML
VIAL (ML) INTRAVENOUS CONTINUOUS PRN
Status: DISCONTINUED | OUTPATIENT
Start: 2023-06-27 | End: 2023-06-27

## 2023-06-27 RX ORDER — SODIUM CHLORIDE 9 MG/ML
INJECTION, SOLUTION INTRAVENOUS CONTINUOUS
Status: DISCONTINUED | OUTPATIENT
Start: 2023-06-27 | End: 2023-06-27 | Stop reason: HOSPADM

## 2023-06-27 RX ORDER — LIDOCAINE HYDROCHLORIDE 20 MG/ML
INJECTION, SOLUTION EPIDURAL; INFILTRATION; INTRACAUDAL; PERINEURAL
Status: DISCONTINUED | OUTPATIENT
Start: 2023-06-27 | End: 2023-06-27

## 2023-06-27 RX ADMIN — PROPOFOL 100 MG: 10 INJECTION, EMULSION INTRAVENOUS at 02:06

## 2023-06-27 RX ADMIN — Medication 220 MCG/KG/MIN: at 02:06

## 2023-06-27 RX ADMIN — SODIUM CHLORIDE: 0.9 INJECTION, SOLUTION INTRAVENOUS at 02:06

## 2023-06-27 RX ADMIN — LIDOCAINE HYDROCHLORIDE 100 MG: 20 INJECTION, SOLUTION EPIDURAL; INFILTRATION; INTRACAUDAL; PERINEURAL at 02:06

## 2023-06-27 NOTE — H&P
[facility]  History & Physical    Subjective:      Chief Complaint/Reason for Admission:      EGD with 96 hour esophageal Bravo pH probe study done off all acid suppressive medicine for at least 4 weeks for GERD and dyspepsia.    Nilton Chin is a 63 y.o. male.    Past Medical History:   Diagnosis Date    Acquired spondylolisthesis 8/29/2022    Allergy     Anal fissure 6/8/2016    Displacement of cervical intervertebral disc 8/29/2022    History of prostatitis     Hypertension     Iliotibial band syndrome affecting lower leg 6/24/2015    Thoracic facet syndrome 8/29/2022     Past Surgical History:   Procedure Laterality Date    COLONOSCOPY      PROSTATE BIOPSY      ROBOT-ASSISTED LAPAROSCOPIC PROSTATECTOMY USING DA EMELY XI N/A 11/25/2019    Procedure: XI ROBOTIC PROSTATECTOMY;  Surgeon: Jose Carlos Lawrence MD;  Location: [facility] OR [address] [place] [address];  Service: Urology;  Laterality: N/A;  3hrs gen with regional     UPPER GASTROINTESTINAL ENDOSCOPY       Family History   Problem Relation Age of Onset    Cataracts Mother     Hypertension Mother     Cataracts Father     Glaucoma Father     Hypertension Father     Colon cancer Paternal Uncle     Diabetes Maternal Grandmother     Heart disease Maternal Grandmother     Cancer Paternal Grandfather         lung/prostate    Diabetes Paternal Grandfather     Blindness Other     Amblyopia Neg Hx     Strabismus Neg Hx     Retinal detachment Neg Hx     Macular degeneration Neg Hx     Stroke Neg Hx     Melanoma Neg Hx     Esophageal cancer Neg Hx      Social History     Tobacco Use    Smoking status: Former     Years: 35.00     Types: Cigarettes     Start date: 6/24/2013    Smokeless tobacco: Never    Tobacco comments:     6 cig a day   Substance Use Topics    Alcohol use: Yes     Comment: socially/ not weekly    Drug use: No       Facility-Administered Medications Prior to Admission   Medication    sars-cov-2 (covid-19) 100 mcg/0.5 ml injection 0.5 mL     PTA  Medications   Medication Sig    cetirizine (ZYRTEC) 10 MG tablet Take 10 mg by mouth once daily.    ferrous gluconate (FERGON) 324 MG tablet Take 1 tablet (324 mg total) by mouth daily with breakfast.    fluticasone propionate (FLONASE) 50 mcg/actuation nasal spray 2 sprays (100 mcg total) by Each Nostril route once daily.    losartan-hydrochlorothiazide 50-12.5 mg (HYZAAR) 50-12.5 mg per tablet Take 1 tablet by mouth once daily.    sildenafiL (VIAGRA) 100 MG tablet Take 1 tablet (100 mg total) by mouth daily as needed for Erectile Dysfunction.     Review of patient's allergies indicates:   Allergen Reactions    Codeine Nausea Only    Vicodin [hydrocodone-acetaminophen]      unknown        Review of Systems   Constitutional:  Negative for fever.   Cardiovascular:  Negative for claudication.   Gastrointestinal:  Positive for heartburn.     Objective:      Vital Signs (Most Recent)       Vital Signs Range (Last 24H):       Physical Exam  Cardiovascular:      Rate and Rhythm: Normal rate.   Pulmonary:      Effort: Pulmonary effort is normal.   Neurological:      Mental Status: He is alert and oriented to person, place, and time.           Assessment:       EGD with 96 hour esophageal Bravo pH probe study done off all acid suppressive medicine for at least 4 weeks for GERD and dyspepsia.      Plan:       EGD with 96 hour esophageal Bravo pH probe study done off all acid suppressive medicine for at least 4 weeks for GERD and dyspepsia.

## 2023-06-27 NOTE — TRANSFER OF CARE
"Anesthesia Transfer of Care Note    Patient: Nilton Chin    Procedure(s) Performed: Procedure(s) (LRB):  EGD (ESOPHAGOGASTRODUODENOSCOPY) (N/A)  PH MONITORING, ESOPHAGUS, WIRELESS, (OFF REFLUX MEDS) (N/A)    Patient location: PACU    Anesthesia Type: general    Transport from OR: Transported from OR on room air with adequate spontaneous ventilation    Post pain: adequate analgesia    Post assessment: no apparent anesthetic complications    Post vital signs: stable    Level of consciousness: awake    Nausea/Vomiting: no nausea/vomiting    Complications: none    Transfer of care protocol was followed      Last vitals:   Visit Vitals  /66 (BP Location: Left arm, Patient Position: Lying)   Pulse 76   Temp 36.4 °C (97.5 °F) (Temporal)   Resp 16   Ht 5' 7" (1.702 m)   Wt 75.3 kg (166 lb)   SpO2 100%   BMI 26.00 kg/m²     "

## 2023-06-27 NOTE — ANESTHESIA POSTPROCEDURE EVALUATION
Anesthesia Post Evaluation    Patient: Nilton Chin    Procedure(s) Performed: Procedure(s) (LRB):  EGD (ESOPHAGOGASTRODUODENOSCOPY) (N/A)  PH MONITORING, ESOPHAGUS, WIRELESS, (OFF REFLUX MEDS) (N/A)    Final Anesthesia Type: general      Patient location during evaluation: GI PACU  Patient participation: Yes- Able to Participate  Level of consciousness: awake and alert  Post-procedure vital signs: reviewed and stable  Pain management: adequate  Airway patency: patent    PONV status at discharge: No PONV  Anesthetic complications: no      Cardiovascular status: blood pressure returned to baseline and stable  Respiratory status: unassisted, spontaneous ventilation and room air  Hydration status: euvolemic  Follow-up not needed.          Vitals Value Taken Time   /80 06/27/23 1531   Temp 36.4 °C (97.5 °F) 06/27/23 1451   Pulse 70 06/27/23 1531   Resp 18 06/27/23 1531   SpO2 100 % 06/27/23 1531         Event Time   Out of Recovery 15:32:55         Pain/Paulina Score: Paulina Score: 10 (6/27/2023  3:06 PM)

## 2023-06-27 NOTE — ANESTHESIA PREPROCEDURE EVALUATION
06/27/2023  Nilton Chin is a 63 y.o., male.  Past Medical History:   Diagnosis Date    Acquired spondylolisthesis 8/29/2022    Allergy     Anal fissure 6/8/2016    Displacement of cervical intervertebral disc 8/29/2022    History of prostatitis     Hypertension     Iliotibial band syndrome affecting lower leg 6/24/2015    Thoracic facet syndrome 8/29/2022     Past Surgical History:   Procedure Laterality Date    COLONOSCOPY      PROSTATE BIOPSY      ROBOT-ASSISTED LAPAROSCOPIC PROSTATECTOMY USING DA EMELY XI N/A 11/25/2019    Procedure: XI ROBOTIC PROSTATECTOMY;  Surgeon: Jose Carlos Lawrence MD;  Location: [facility] OR [address] [place] [address];  Service: Urology;  Laterality: N/A;  3hrs gen with regional     UPPER GASTROINTESTINAL ENDOSCOPY       Patient Active Problem List   Diagnosis    Cervical radiculopathy    Hypertension    Other hyperlipidemia    Prostate cancer    Erectile dysfunction following radical prostatectomy    Urgency of urination    Pelvic floor dysfunction    Displacement of lumbar intervertebral disc without myelopathy         Pre-op Assessment    I have reviewed the Patient Summary Reports.     I have reviewed the Nursing Notes. I have reviewed the NPO Status.   I have reviewed the Medications.     Review of Systems  Anesthesia Hx:  No problems with previous Anesthesia    Social:  Former Smoker, Alcohol Use    Cardiovascular:   Hypertension    Neurological:   Neuromuscular Disease,        Physical Exam  General: Alert and Oriented    Airway:  Mallampati: I   Mouth Opening: Normal  TM Distance: Normal  Tongue: Normal  Neck ROM: Normal ROM    Dental:  Intact        Anesthesia Plan  Type of Anesthesia, risks & benefits discussed:    Anesthesia Type: Gen Natural Airway  Intra-op Monitoring Plan: Standard ASA Monitors  Induction:  IV  Airway Plan: Direct  Informed Consent: Informed consent  signed with the Patient and all parties understand the risks and agree with anesthesia plan.  All questions answered.   ASA Score: 2    Ready For Surgery From Anesthesia Perspective.     .

## 2023-06-27 NOTE — PROVATION PATIENT INSTRUCTIONS
Discharge Summary/Instructions after an Endoscopic Procedure  Patient Name: Nilton Chin  Patient MRN: [id number]  Patient YOB: 1960  Tuesday, June 27, 2023  Almas Jean MD  Dear patient,  As a result of recent federal legislation (The Federal Cures Act), you may   receive lab or pathology results from your procedure in your [facility]   account before your physician is able to contact you. Your physician or   their representative will relay the results to you with their   recommendations at their soonest availability.  Thank you,  RESTRICTIONS:  During your procedure today, you received medications for sedation.  These   medications may affect your judgment, balance and coordination.  Therefore,   for 24 hours, you have the following restrictions:   - DO NOT drive a car, operate machinery, make legal/financial decisions,   sign important papers or drink alcohol.    ACTIVITY:  Today: no heavy lifting, straining or running due to procedural   sedation/anesthesia.  The following day: return to full activity including work.  DIET:  Eat and drink normally unless instructed otherwise.     TREATMENT FOR COMMON SIDE EFFECTS:  - Mild abdominal pain, nausea, belching, bloating or excessive gas:  rest,   eat lightly and use a heating pad.  - Sore Throat: treat with throat lozenges and/or gargle with warm salt   water.  - Because air was used during the procedure, expelling large amounts of air   from your rectum or belching is normal.  - If a bowel prep was taken, you may not have a bowel movement for 1-3 days.    This is normal.  SYMPTOMS TO WATCH FOR AND REPORT TO YOUR PHYSICIAN:  1. Abdominal pain or bloating, other than gas cramps.  2. Chest pain.  3. Back pain.  4. Signs of infection such as: chills or fever occurring within 24 hours   after the procedure.  5. Rectal bleeding, which would show as bright red, maroon, or black stools.   (A tablespoon of blood from the rectum is not serious, especially if    hemorrhoids are present.)  6. Vomiting.  7. Weakness or dizziness.  GO DIRECTLY TO THE NEAREST EMERGENCY ROOM IF YOU HAVE ANY OF THE FOLLOWING:      Difficulty breathing              Chills and/or fever over 101 F   Persistent vomiting and/or vomiting blood   Severe abdominal pain   Severe chest pain   Black, tarry stools   Bleeding- more than one tablespoon   Any other symptom or condition that you feel may need urgent attention  Your doctor recommends these additional instructions:  If any biopsies were taken, your doctors clinic will contact you in 1 to 2   weeks with any results.  - Discharge patient to home.   - Await pathology results.   - Telephone endoscopist for pathology results in 2 weeks.   - Return to GI clinic at the next available appointment.   - The findings and recommendations were discussed with the patient.  For questions, problems or results please call your physician - Almas Jean MD at Work:  [phone].  [facility], EMERGENCY ROOM PHONE NUMBER: [phone]  IF A COMPLICATION OR EMERGENCY SITUATION ARISES AND YOU ARE UNABLE TO REACH   YOUR PHYSICIAN - GO DIRECTLY TO THE EMERGENCY ROOM.  Almas Jean MD  6/27/2023 2:55:18 PM  This report has been verified and signed electronically.  Dear patient,  As a result of recent federal legislation (The Federal Cures Act), you may   receive lab or pathology results from your procedure in your [facility]   account before your physician is able to contact you. Your physician or   their representative will relay the results to you with their   recommendations at their soonest availability.  Thank you,  PROVATION

## 2023-07-05 LAB
FINAL PATHOLOGIC DIAGNOSIS: NORMAL
GROSS: NORMAL
Lab: NORMAL

## 2023-07-06 ENCOUNTER — PATIENT MESSAGE (OUTPATIENT)
Dept: INTERNAL MEDICINE | Facility: CLINIC | Age: 63
End: 2023-07-06
Payer: COMMERCIAL

## 2023-07-06 DIAGNOSIS — R53.83 FATIGUE, UNSPECIFIED TYPE: Primary | ICD-10-CM

## 2023-07-06 DIAGNOSIS — Z00.00 ANNUAL PHYSICAL EXAM: ICD-10-CM

## 2023-07-06 DIAGNOSIS — I10 PRIMARY HYPERTENSION: ICD-10-CM

## 2023-07-06 DIAGNOSIS — E78.49 OTHER HYPERLIPIDEMIA: ICD-10-CM

## 2023-07-08 ENCOUNTER — PATIENT MESSAGE (OUTPATIENT)
Dept: GASTROENTEROLOGY | Facility: CLINIC | Age: 63
End: 2023-07-08
Payer: COMMERCIAL

## 2023-07-08 ENCOUNTER — DOCUMENTATION ONLY (OUTPATIENT)
Dept: GASTROENTEROLOGY | Facility: CLINIC | Age: 63
End: 2023-07-08
Payer: COMMERCIAL

## 2023-07-08 PROCEDURE — 91035 PR GERD TST W/ MUCOS PH ELECTROD: ICD-10-PCS | Mod: 26,,, | Performed by: INTERNAL MEDICINE

## 2023-07-08 PROCEDURE — 91035 G-ESOPH REFLX TST W/ELECTROD: CPT | Mod: 26,,, | Performed by: INTERNAL MEDICINE

## 2023-07-08 NOTE — PROGRESS NOTES
Peter your EGD pathology was benign no evidence of celiac sprue no evidence of H pylori.     1. Duodenum, biopsy:   - Duodenal mucosa with minimal reactive changes.     2. Stomach, biopsy:   - Antral and oxyntic mucosa with no significant histologic abnormality.     - Immunostain for H.pylori is negative, see comment.     COMMENT: Appropriate positive controls are examined.   Comment: Interp By Olga Rosario MD, Signed on 07/05/2023

## 2023-07-08 NOTE — PROGRESS NOTES
Peter your EGD pathology was benign your esophageal Bravo pH probe study done off all acid suppressive medicine showed no abnormal esophageal acid exposure no heartburn correlation with abnormal esophageal acid exposure there was 1 correlation with your chest pain but overall you have normal esophageal acid exposure so I do not recommend you being on long-term proton pump inhibitors

## 2023-07-08 NOTE — PROVATION PATIENT INSTRUCTIONS
Discharge Summary/Instructions after an Endoscopic Procedure  Patient Name: Nilton Chin  Patient MRN: [id number]  Patient YOB: 1960  Tuesday, June 27, 2023  Almas Jean MD  Dear patient,  As a result of recent federal legislation (The Federal Cures Act), you may   receive lab or pathology results from your procedure in your [facility]   account before your physician is able to contact you. Your physician or   their representative will relay the results to you with their   recommendations at their soonest availability.  Thank you,  RESTRICTIONS:  During your procedure today, you received medications for sedation.  These   medications may affect your judgment, balance and coordination.  Therefore,   for 24 hours, you have the following restrictions:   - DO NOT drive a car, operate machinery, make legal/financial decisions,   sign important papers or drink alcohol.    ACTIVITY:  Today: no heavy lifting, straining or running due to procedural   sedation/anesthesia.  The following day: return to full activity including work.  DIET:  Eat and drink normally unless instructed otherwise.     TREATMENT FOR COMMON SIDE EFFECTS:  - Mild abdominal pain, nausea, belching, bloating or excessive gas:  rest,   eat lightly and use a heating pad.  - Sore Throat: treat with throat lozenges and/or gargle with warm salt   water.  - Because air was used during the procedure, expelling large amounts of air   from your rectum or belching is normal.  - If a bowel prep was taken, you may not have a bowel movement for 1-3 days.    This is normal.  SYMPTOMS TO WATCH FOR AND REPORT TO YOUR PHYSICIAN:  1. Abdominal pain or bloating, other than gas cramps.  2. Chest pain.  3. Back pain.  4. Signs of infection such as: chills or fever occurring within 24 hours   after the procedure.  5. Rectal bleeding, which would show as bright red, maroon, or black stools.   (A tablespoon of blood from the rectum is not serious, especially if    hemorrhoids are present.)  6. Vomiting.  7. Weakness or dizziness.  GO DIRECTLY TO THE NEAREST EMERGENCY ROOM IF YOU HAVE ANY OF THE FOLLOWING:      Difficulty breathing              Chills and/or fever over 101 F   Persistent vomiting and/or vomiting blood   Severe abdominal pain   Severe chest pain   Black, tarry stools   Bleeding- more than one tablespoon   Any other symptom or condition that you feel may need urgent attention  Your doctor recommends these additional instructions:  If any biopsies were taken, your doctors clinic will contact you in 1 to 2   weeks with any results.  - Discharge patient to home.   - Return to GI clinic at the next available appointment.   - The findings and recommendations were discussed with the patient.  For questions, problems or results please call your physician - Almas Jean MD at Work:  [phone].  [facility], EMERGENCY ROOM PHONE NUMBER: [phone]  IF A COMPLICATION OR EMERGENCY SITUATION ARISES AND YOU ARE UNABLE TO REACH   YOUR PHYSICIAN - GO DIRECTLY TO THE EMERGENCY ROOM.  Almas Jean MD  7/8/2023 3:18:52 PM  This report has been verified and signed electronically.  Dear patient,  As a result of recent federal legislation (The Federal Cures Act), you may   receive lab or pathology results from your procedure in your [facility]   account before your physician is able to contact you. Your physician or   their representative will relay the results to you with their   recommendations at their soonest availability.  Thank you,  PROVATION

## 2023-07-19 ENCOUNTER — OFFICE VISIT (OUTPATIENT)
Dept: GASTROENTEROLOGY | Facility: CLINIC | Age: 63
End: 2023-07-19
Payer: COMMERCIAL

## 2023-07-19 DIAGNOSIS — R10.13 DYSPEPSIA: ICD-10-CM

## 2023-07-19 DIAGNOSIS — K76.89 LIVER CYST: ICD-10-CM

## 2023-07-19 DIAGNOSIS — K44.9 HIATAL HERNIA: Primary | ICD-10-CM

## 2023-07-19 DIAGNOSIS — D50.9 IRON DEFICIENCY ANEMIA, UNSPECIFIED IRON DEFICIENCY ANEMIA TYPE: ICD-10-CM

## 2023-07-19 DIAGNOSIS — E80.4 GILBERT SYNDROME: ICD-10-CM

## 2023-07-19 PROCEDURE — 1160F RVW MEDS BY RX/DR IN RCRD: CPT | Mod: CPTII,95,, | Performed by: INTERNAL MEDICINE

## 2023-07-19 PROCEDURE — 1159F MED LIST DOCD IN RCRD: CPT | Mod: CPTII,95,, | Performed by: INTERNAL MEDICINE

## 2023-07-19 PROCEDURE — 99213 PR OFFICE/OUTPT VISIT, EST, LEVL III, 20-29 MIN: ICD-10-PCS | Mod: 95,,, | Performed by: INTERNAL MEDICINE

## 2023-07-19 PROCEDURE — 99213 OFFICE O/P EST LOW 20 MIN: CPT | Mod: 95,,, | Performed by: INTERNAL MEDICINE

## 2023-07-19 PROCEDURE — 1159F PR MEDICATION LIST DOCUMENTED IN MEDICAL RECORD: ICD-10-PCS | Mod: CPTII,95,, | Performed by: INTERNAL MEDICINE

## 2023-07-19 PROCEDURE — 1160F PR REVIEW ALL MEDS BY PRESCRIBER/CLIN PHARMACIST DOCUMENTED: ICD-10-PCS | Mod: CPTII,95,, | Performed by: INTERNAL MEDICINE

## 2023-07-19 NOTE — PROGRESS NOTES
The patient location is: At home  The chief complaint leading to consultation is: Dyspepsia and iron deficiency anemia, Bravo pH report.    Visit type: audiovisual    Face to Face time with patient: 20 minutes of total time spent on the encounter, which includes face to face time and non-face to face time preparing to see the patient (eg, review of tests), Obtaining and/or reviewing separately obtained history, Documenting clinical information in the electronic or other health record, Independently interpreting results (not separately reported) and communicating results to the patient/family/caregiver, or Care coordination (not separately reported).       Each patient to whom he or she provides medical services by telemedicine is:  (1) informed of the relationship between the physician and patient and the respective role of any other health care provider with respect to management of the patient; and (2) notified that he or she may decline to receive medical services by telemedicine and may withdraw from such care at any time.    Notes:         [facility] Consultation Note    Reason for Consult:  The primary encounter diagnosis was Hiatal hernia. Diagnoses of Dyspepsia, Iron deficiency anemia, unspecified iron deficiency anemia type, Gilbert syndrome, and Liver cyst were also pertinent to this visit.    PCP:   Arnulfo Hammonds       Referring MD:  No referring provider defined for this encounter.    Initial History of Present Illness (HPI):  This is a 63 y.o. male here for evaluation of follow-up of small liver cyst patient has follow-up ultrasound in 6 months from his last schedule underwent an EGD with esophageal Bravo pH probe and biopsy show no celiac sprue no H pylori just a 1 cm hiatal hernia no erosive esophagitis patient has esophageal Bravo pH probe study done off PPIs and off H2 blockers essentially normal not having symptoms of reflux she is been off his PPI doing well has slight  iron deficiency not interested in a repeat colonoscopy as last 1 was in July 2021 not interested in a small-bowel video capsule study for further evaluation he will continue take his iron pill follow-up with his primary care doctor for repeat labs and will have him follow-up with general GI clinic 6 months for follow-up sooner if any concerns    Abdominal pain - no  Reflux - no  Dysphagia - no   Bowel habits - normal  GI bleeding - none  NSAID usage - occasional Advil for musculoskeletal pain  Interval HPI 07/19/2023:  The patient's last visit with me was on 5/26/2023.      ROS:  Constitutional: No fevers, chills, No weight loss  ENT:  No heartburn no dysphagia no odynophagia no hoarseness  CV: No chest pain, no palpitation  Pulm: No cough, No shortness of breath, no wheezing  Ophtho: No vision changes  GI: see HPI  Derm: No rash, no itching  Heme: No lymphadenopathy, No easy bruising  MSK:  Chronic musculoskeletal pain cervical and lumbar spine as well as shoulder  : No dysuria, No hematuria  Endo: No hot or cold intolerance  Neuro: No syncope, No seizure, no strokes  Psych: No uncontrolled anxiety, No uncontrolled depression    Medical History:  has a past medical history of Acquired spondylolisthesis (8/29/2022), Allergy, Anal fissure (6/8/2016), Displacement of cervical intervertebral disc (8/29/2022), History of prostatitis, Hypertension, Iliotibial band syndrome affecting lower leg (6/24/2015), and Thoracic facet syndrome (8/29/2022).    Surgical History:  has a past surgical history that includes Prostate biopsy; Robot-assisted laparoscopic prostatectomy using da Luis Xi (N/A, 11/25/2019); Colonoscopy; Upper gastrointestinal endoscopy; Esophagogastroduodenoscopy (N/A, 6/27/2023); and ph monitoring, esophagus, wireless, (off reflux meds) (N/A, 6/27/2023).    Family History: family history includes Blindness in an other family member; Cancer in his paternal grandfather; Cataracts in his father and mother;  Colon cancer in his paternal uncle; Diabetes in his maternal grandmother and paternal grandfather; Glaucoma in his father; Heart disease in his maternal grandmother; Hypertension in his father and mother..     Social History:  reports that he has quit smoking. His smoking use included cigarettes. He started smoking about 10 years ago. He has never used smokeless tobacco. He reports current alcohol use. He reports that he does not use drugs.    Review of patient's allergies indicates:   Allergen Reactions    Codeine Nausea Only    Vicodin [hydrocodone-acetaminophen]      unknown       Medication List with Changes/Refills   Current Medications    CETIRIZINE (ZYRTEC) 10 MG TABLET    Take 10 mg by mouth once daily.    FERROUS GLUCONATE (FERGON) 324 MG TABLET    Take 1 tablet (324 mg total) by mouth daily with breakfast.    FLUTICASONE PROPIONATE (FLONASE) 50 MCG/ACTUATION NASAL SPRAY    2 sprays (100 mcg total) by Each Nostril route once daily.    LOSARTAN-HYDROCHLOROTHIAZIDE 50-12.5 MG (HYZAAR) 50-12.5 MG PER TABLET    Take 1 tablet by mouth once daily.    SILDENAFIL (VIAGRA) 100 MG TABLET    Take 1 tablet (100 mg total) by mouth daily as needed for Erectile Dysfunction.         Objective Findings:    Vital Signs:  There were no vitals taken for this visit.  There is no height or weight on file to calculate BMI.    Physical Exam:  Telemedicine video visit  General Appearance: Well appearing in no acute distress  Eyes:    No scleral icterus  Neurologic:  Alert and oriented x4  Psychiatric:  Normal speech mentation and affect    Labs:  Lab Results   Component Value Date    WBC 4.46 05/26/2023    HGB 13.9 (L) 05/26/2023    HCT 43.3 05/26/2023     05/26/2023    CHOL 244 (H) 08/19/2022    TRIG 104 08/19/2022    HDL 54 08/19/2022    ALT 20 05/26/2023    AST 20 05/26/2023     05/26/2023    K 3.5 05/26/2023     05/26/2023    CREATININE 1.0 05/26/2023    CREATININE 1.0 05/26/2023    BUN 15 05/26/2023    CO2  30 (H) 05/26/2023    TSH 2.868 08/19/2022    PSA 0.16 08/14/2020         Medical Decision Making:  Lab work reviewed  Gilbert's talk given  Liver cyst talk given  EGD report images and path reviewed Bravo pH probe results reviewed with patient  EXAMINATION:  CT ABDOMEN PELVIS WITH CONTRAST     CLINICAL HISTORY:  abdominal pain personal history of prostate cancer family history of non-Hodgkin's lymphoma in his dad in his brother; Unspecified abdominal pain     TECHNIQUE:  Low dose axial images, sagittal and coronal reformations were obtained from the lung bases to the pubic symphysis following the IV administration of 150 mL of Omnipaque 350 in the oral administration of 13 50 cc barium sulfate Readi-Cat suspension     COMPARISON:  None.     FINDINGS:  Limited evaluation of structures at the base of the chest show no concerning masses or nodules.     The liver is normal in size.  No solid mass or biliary ductal dilatation.  Tiny hypodensity in segment 5 too small to accurately characterize but almost certainly likely to represent a small cyst.  The gallbladder is unremarkable.     Spleen, adrenal glands, and pancreas show no focal abnormality.     Bilateral kidneys are normal in size and position.  No hydronephrosis, solid mass, or nephrolithiasis.  Urinary bladder is decompressed but shows no focal wall thickening.  Prior prostatectomy.     Gastrointestinal tract shows no wall thickening or obstruction.  Normal appendix is present.  No free fluid or free gas.  No concerning lymphadenopathy.  Bilateral fat containing inguinal hernias.     Mild calcific atherosclerosis without aneurysm.  No evidence of an acute fracture or destructive osseous lesion.     Impression:     No acute abdominal process or evidence of metastatic disease.        Electronically signed by: Blas Srivastava MD  Date:                                            05/29/2023  GI MA team please schedule Nilton for a follow-up ultrasound of his liver in  6 months which would be December 2023 for follow-up of small liver cyst orders placed.     Peter your CT scan overall looks good no concerning findings for your symptoms     Impression:     No acute abdominal process or evidence of metastatic disease.     The liver is normal in size.  No solid mass or biliary ductal dilatation.  Tiny hypodensity in segment 5 too small to accurately characterize but almost certainly likely to represent a small cyst.  The gallbladder is unremarkable.           Electronically signed by: Blas Srivastava MD  Date:                                            05/29/2023  The Olympus scope                          GIF- ([id number]) was introduced through the                          mouth, and advanced to the third part of duodenum.   Findings:        The examined duodenum was normal. Biopsies for histology were taken        with a cold forceps for evaluation of celiac disease. Estimated        blood loss was minimal.        The entire examined stomach was normal. Biopsies were taken with a        cold forceps for histology. Biopsies were taken with a cold forceps        for Helicobacter pylori testing. Biopsies were taken with a cold        forceps for histology. Estimated blood loss was minimal.        The cardia and gastric fundus were normal on retroflexion.        A 1 cm hiatal hernia was found. The proximal extent of the gastric        folds (end of tubular esophagus) was 39 cm from the incisors. The        hiatal narrowing was 40 cm from the incisors. The Z-line was 39 cm        from the incisors. Z-line was sharp. No esophagitis and no columnar        esophagus and no lesions seen with NBI or white light.        The Z-line was regular and was found 39 cm from the incisors. The        BRAVO capsule with delivery system was introduced through the mouth        and advanced into the esophagus, such that the BRAVO pH capsule was        positioned 33 cm from the incisors, which was 6 cm  proximal to the        GE junction. The BRAVO pH capsule was then deployed and attached to        the esophageal mucosa. The delivery system was then withdrawn.        Endoscopy was utilized for probe placement and diagnostic evaluation.   Impression:            - Normal examined duodenum. Biopsied.                          - Normal stomach. Biopsied.                          - 1 cm hiatal hernia.                          - Z-line regular, 39 cm from the incisors.                          - The BRAVO pH capsule was deployed.   Recommendation:        - Discharge patient to home.                          - Await pathology results.                          - Telephone endoscopist for pathology results in 2                          weeks.                          - Return to GI clinic at the next available                          appointment.                          - The findings and recommendations were discussed                          with the patient.   Attending Participation:        I personally performed the entire procedure.   Almas Jean MD   6/27/2023     1. Duodenum, biopsy:   - Duodenal mucosa with minimal reactive changes.     2. Stomach, biopsy:   - Antral and oxyntic mucosa with no significant histologic abnormality.     - Immunostain for H.pylori is negative, see comment.     COMMENT: Appropriate positive controls are examined.    Comment: Interp By Olga Rosario MD, Signed on 07/05/2023    Indications:           Heartburn, Exclusion of esophageal reflux   Comorbidities        EGD with 96 hour esophageal Bravo pH probe study done off all acid        suppressive medicine for at least 4 weeks   Providers:             Almas Jean MD, Minh Mcfarland RN   Patient Profile:       This is a 63 year old male.   Referring MD:          Arnulfo Hammonds DO   Complications:         No immediate complications.   Medicines:             Bravo pH probe study done OFF all acid suppressive                           medicine for at least 4 weeks   Procedure:             The BRAVO pH capsule was previously placed.                          Results from that study were obtained for                          interpretation.   Findings:        DAY 1 ACID REFLUX ANALYSIS:        - Acid Exposure with pH < 4.0:        Total Percent Time: 3.4 %.        - Number of Reflux Episodes:        Total Refluxes: 16        Number of reflux episodes > 5 minutes: 2.        - Longest reflux episode: 18 minutes        - Symptom Correlation: Only a portion of the reflux episodes could        be correlated with symptoms noted during the study. Symptom        correlation with chest pain only not heartburn        - See the [employer] BRAVO data report for further details.        DAY 2 ACID REFLUX ANALYSIS:        - Acid Exposure Time(s) for pH < 4.0:        Total Percent Time: 3.7 %.        - Number of Reflux Episodes:        Total Refluxes: 11        Number of reflux episodes > 5 minutes: 4.        - Longest reflux episode: 14 minutes        - Symptom Correlation: It could not be determined whether reflux        episodes correlated with symptoms noted during the study.        - See the [employer] BRAVO data report for further details.        DAY 3 ACID REFLUX ANALYSIS:        - Acid Exposure Time(s) for pH < 4.0:        Total Percent Time: 4.6 %.        - Number of Reflux Episodes:        Total Refluxes: 15        Number of reflux episodes > 5 minutes: 4.        - Longest reflux episode: 20 minutes        - Symptom Correlation: Reflux episodes did not correlate with        symptoms noted during the study.        - See the [employer] CANWE STUDIOSVO data report for further details.        DAY 4 ACID REFLUX ANALYSIS:        - Acid Exposure Time(s) for pH < 4.0:        Total Percent Time: 1.3 %.        - Number of Reflux Episodes:        Total Refluxes: 19        Number of reflux episodes > 5 minutes: 0.        - Longest reflux episode: 3 minutes         - Symptom Correlation: It could not be determined whether reflux        episodes correlated with symptoms noted during the study.        - See the [employer] BRAVO data report for further details.        TOTALS FOR ACID REFLUX ANALYSIS:        - Acid Exposure Time(s) for pH < 4.0:        Total Percent Time: 3.3 %.        - Number of Reflux Episodes:        Total Refluxes: 61        Number of reflux episodes > 5 minutes: 10.        - Longest reflux episode: 20 minutes        DeMeester Score=11.4 (normal is less than 14.72)        Heartburn: SI=0.0 SAP=0.0; Chest Pain: SI=50 SAP=98.1        - Symptom Correlation: Reflux episodes did not correlate with        symptoms noted during the study.        - See the [employer] BRAVO data report for further details.   Impression:            - Normal ambulatory esophageal pH study.   Recommendation:        - Discharge patient to home.                          - Return to GI clinic at the next available                          appointment.                          - The findings and recommendations were discussed                          with the patient.   Almas Jean MD   7/8/2023   Assessment:  1. Hiatal hernia    2. Dyspepsia    3. Iron deficiency anemia, unspecified iron deficiency anemia type    4. Gilbert syndrome    5. Liver cyst         Recommendations:  1. Follow GERD precautions do not eat a large fatty meal in lie down within 3-4 hours   2. Esophageal Bravo pH probe study done off PPIs and off H2 blockers show pretty normal esophageal acid exposure no significant increase exposure patient is not having heartburn symptoms he is going to stay off heartburn medicine  3. Small little liver cyst follow-up ultrasound 6 months from last  4. Slightly iron deficient not interested in repeat colonoscopy or small-bowel video capsule endoscopy patient taking oral iron  5. Return GI clinic 6 months for follow-up okay for telemedicine video visit  No follow-ups on  file.      Order summary:         Thank you so much for allowing me to participate in the care of Nilton Srivastavajayson Jean MD    DISCLAIMER: This note was prepared with [employer] voice recognition transcription software. Garbled syntax, mangled or inadvertent pronouns, and other bizarre constructions may be attributed to that software system. While efforts were made to correct any mistakes made by this voice recognition program, some errors and/or omissions may remain in the note that were missed when the note was originally created.

## 2023-07-27 ENCOUNTER — LAB VISIT (OUTPATIENT)
Dept: LAB | Facility: HOSPITAL | Age: 63
End: 2023-07-27
Attending: INTERNAL MEDICINE
Payer: COMMERCIAL

## 2023-07-27 DIAGNOSIS — I10 PRIMARY HYPERTENSION: ICD-10-CM

## 2023-07-27 DIAGNOSIS — R53.83 FATIGUE, UNSPECIFIED TYPE: ICD-10-CM

## 2023-07-27 DIAGNOSIS — E78.49 OTHER HYPERLIPIDEMIA: ICD-10-CM

## 2023-07-27 DIAGNOSIS — Z00.00 ANNUAL PHYSICAL EXAM: ICD-10-CM

## 2023-07-27 DIAGNOSIS — D50.9 IRON DEFICIENCY ANEMIA, UNSPECIFIED IRON DEFICIENCY ANEMIA TYPE: ICD-10-CM

## 2023-07-27 LAB
ALBUMIN SERPL BCP-MCNC: 4.2 G/DL (ref 3.5–5.2)
ALP SERPL-CCNC: 60 U/L (ref 55–135)
ALT SERPL W/O P-5'-P-CCNC: 17 U/L (ref 10–44)
ANION GAP SERPL CALC-SCNC: 11 MMOL/L (ref 8–16)
AST SERPL-CCNC: 21 U/L (ref 10–40)
BASOPHILS # BLD AUTO: 0.03 K/UL (ref 0–0.2)
BASOPHILS NFR BLD: 0.7 % (ref 0–1.9)
BILIRUB SERPL-MCNC: 1.1 MG/DL (ref 0.1–1)
BUN SERPL-MCNC: 17 MG/DL (ref 8–23)
CALCIUM SERPL-MCNC: 9.4 MG/DL (ref 8.7–10.5)
CHLORIDE SERPL-SCNC: 104 MMOL/L (ref 95–110)
CHOLEST SERPL-MCNC: 202 MG/DL (ref 120–199)
CHOLEST/HDLC SERPL: 3.8 {RATIO} (ref 2–5)
CO2 SERPL-SCNC: 27 MMOL/L (ref 23–29)
COMPLEXED PSA SERPL-MCNC: <0.01 NG/ML (ref 0–4)
CREAT SERPL-MCNC: 1 MG/DL (ref 0.5–1.4)
DIFFERENTIAL METHOD: ABNORMAL
EOSINOPHIL # BLD AUTO: 0.1 K/UL (ref 0–0.5)
EOSINOPHIL NFR BLD: 1.1 % (ref 0–8)
ERYTHROCYTE [DISTWIDTH] IN BLOOD BY AUTOMATED COUNT: 12.2 % (ref 11.5–14.5)
EST. GFR  (NO RACE VARIABLE): >60 ML/MIN/1.73 M^2
ESTIMATED AVG GLUCOSE: 114 MG/DL (ref 68–131)
FERRITIN SERPL-MCNC: 95 NG/ML (ref 20–300)
GLUCOSE SERPL-MCNC: 104 MG/DL (ref 70–110)
HBA1C MFR BLD: 5.6 % (ref 4–5.6)
HCT VFR BLD AUTO: 40.5 % (ref 40–54)
HDLC SERPL-MCNC: 53 MG/DL (ref 40–75)
HDLC SERPL: 26.2 % (ref 20–50)
HGB BLD-MCNC: 13.5 G/DL (ref 14–18)
HGB BLD-MCNC: 13.5 G/DL (ref 14–18)
IMM GRANULOCYTES # BLD AUTO: 0.01 K/UL (ref 0–0.04)
IMM GRANULOCYTES NFR BLD AUTO: 0.2 % (ref 0–0.5)
IRON SERPL-MCNC: 89 UG/DL (ref 45–160)
LDLC SERPL CALC-MCNC: 128.6 MG/DL (ref 63–159)
LYMPHOCYTES # BLD AUTO: 0.8 K/UL (ref 1–4.8)
LYMPHOCYTES NFR BLD: 18.5 % (ref 18–48)
MCH RBC QN AUTO: 31.4 PG (ref 27–31)
MCHC RBC AUTO-ENTMCNC: 33.3 G/DL (ref 32–36)
MCV RBC AUTO: 94 FL (ref 82–98)
MONOCYTES # BLD AUTO: 0.3 K/UL (ref 0.3–1)
MONOCYTES NFR BLD: 6.7 % (ref 4–15)
NEUTROPHILS # BLD AUTO: 3.3 K/UL (ref 1.8–7.7)
NEUTROPHILS NFR BLD: 72.8 % (ref 38–73)
NONHDLC SERPL-MCNC: 149 MG/DL
NRBC BLD-RTO: 0 /100 WBC
PLATELET # BLD AUTO: 192 K/UL (ref 150–450)
PMV BLD AUTO: 11.5 FL (ref 9.2–12.9)
POTASSIUM SERPL-SCNC: 4.3 MMOL/L (ref 3.5–5.1)
PROT SERPL-MCNC: 7 G/DL (ref 6–8.4)
RBC # BLD AUTO: 4.3 M/UL (ref 4.6–6.2)
SATURATED IRON: 21 % (ref 20–50)
SODIUM SERPL-SCNC: 142 MMOL/L (ref 136–145)
TOTAL IRON BINDING CAPACITY: 434 UG/DL (ref 250–450)
TRANSFERRIN SERPL-MCNC: 293 MG/DL (ref 200–375)
TRIGL SERPL-MCNC: 102 MG/DL (ref 30–150)
TSH SERPL DL<=0.005 MIU/L-ACNC: 2.87 UIU/ML (ref 0.4–4)
WBC # BLD AUTO: 4.49 K/UL (ref 3.9–12.7)

## 2023-07-27 PROCEDURE — 84443 ASSAY THYROID STIM HORMONE: CPT | Performed by: INTERNAL MEDICINE

## 2023-07-27 PROCEDURE — 80053 COMPREHEN METABOLIC PANEL: CPT | Performed by: INTERNAL MEDICINE

## 2023-07-27 PROCEDURE — 85025 COMPLETE CBC W/AUTO DIFF WBC: CPT | Performed by: INTERNAL MEDICINE

## 2023-07-27 PROCEDURE — 36415 COLL VENOUS BLD VENIPUNCTURE: CPT | Performed by: INTERNAL MEDICINE

## 2023-07-27 PROCEDURE — 80061 LIPID PANEL: CPT | Performed by: INTERNAL MEDICINE

## 2023-07-27 PROCEDURE — 83036 HEMOGLOBIN GLYCOSYLATED A1C: CPT | Performed by: INTERNAL MEDICINE

## 2023-07-27 PROCEDURE — 82728 ASSAY OF FERRITIN: CPT | Performed by: INTERNAL MEDICINE

## 2023-07-27 PROCEDURE — 84466 ASSAY OF TRANSFERRIN: CPT | Performed by: INTERNAL MEDICINE

## 2023-07-27 PROCEDURE — 84153 ASSAY OF PSA TOTAL: CPT | Performed by: INTERNAL MEDICINE

## 2023-08-01 ENCOUNTER — PATIENT MESSAGE (OUTPATIENT)
Dept: INTERNAL MEDICINE | Facility: CLINIC | Age: 63
End: 2023-08-01
Payer: COMMERCIAL

## 2023-08-27 ENCOUNTER — TELEPHONE (OUTPATIENT)
Dept: ENDOSCOPY | Facility: HOSPITAL | Age: 63
End: 2023-08-27
Payer: COMMERCIAL

## 2023-08-27 DIAGNOSIS — D50.9 IRON DEFICIENCY ANEMIA, UNSPECIFIED IRON DEFICIENCY ANEMIA TYPE: ICD-10-CM

## 2023-08-27 RX ORDER — FERROUS GLUCONATE 324(38)MG
324 TABLET ORAL
Qty: 90 TABLET | Refills: 1 | Status: SHIPPED | OUTPATIENT
Start: 2023-08-27 | End: 2023-11-04 | Stop reason: SDUPTHER

## 2023-08-27 NOTE — PROGRESS NOTES
GI MA team - please tell patient that they are iron deficient and anemic and recommend that they take ferrous gluconate one 324mg pill once daily for next 3 months.    GI MA team -  Please order repeat fasting Hemoglobin, Iron/TIBC, and Ferritin in 8 weeks - Orders placed.     Nilton recommend colonoscopy follow-up for your persistent anemia referral placed to endoscopy schedulers

## 2023-08-28 NOTE — TELEPHONE ENCOUNTER
----- Message from Almas Jean MD sent at 8/27/2023 11:50 AM CDT -----  GI MA team - please tell patient that they are iron deficient and anemic and recommend that they take ferrous gluconate one 324mg pill once daily for next 3 months.    GI MA team -  Please order repeat fasting Hemoglobin, Iron/TIBC, and Ferritin in 8 weeks - Orders placed.     Nilton recommend colonoscopy follow-up for your persistent anemia referral placed to endoscopy schedulers

## 2023-08-31 ENCOUNTER — OFFICE VISIT (OUTPATIENT)
Dept: INTERNAL MEDICINE | Facility: CLINIC | Age: 63
End: 2023-08-31
Payer: COMMERCIAL

## 2023-08-31 VITALS
OXYGEN SATURATION: 99 % | SYSTOLIC BLOOD PRESSURE: 138 MMHG | WEIGHT: 160.25 LBS | RESPIRATION RATE: 18 BRPM | DIASTOLIC BLOOD PRESSURE: 84 MMHG | HEIGHT: 67 IN | BODY MASS INDEX: 25.15 KG/M2 | TEMPERATURE: 97 F | HEART RATE: 75 BPM

## 2023-08-31 DIAGNOSIS — Z00.00 ANNUAL PHYSICAL EXAM: Primary | ICD-10-CM

## 2023-08-31 DIAGNOSIS — D50.9 IRON DEFICIENCY ANEMIA, UNSPECIFIED IRON DEFICIENCY ANEMIA TYPE: ICD-10-CM

## 2023-08-31 DIAGNOSIS — C61 PROSTATE CANCER: ICD-10-CM

## 2023-08-31 DIAGNOSIS — E78.49 OTHER HYPERLIPIDEMIA: ICD-10-CM

## 2023-08-31 DIAGNOSIS — I70.0 AORTIC ATHEROSCLEROSIS: ICD-10-CM

## 2023-08-31 DIAGNOSIS — I10 PRIMARY HYPERTENSION: Chronic | ICD-10-CM

## 2023-08-31 PROCEDURE — 3075F PR MOST RECENT SYSTOLIC BLOOD PRESS GE 130-139MM HG: ICD-10-PCS | Mod: CPTII,S$GLB,, | Performed by: INTERNAL MEDICINE

## 2023-08-31 PROCEDURE — 3075F SYST BP GE 130 - 139MM HG: CPT | Mod: CPTII,S$GLB,, | Performed by: INTERNAL MEDICINE

## 2023-08-31 PROCEDURE — 3079F DIAST BP 80-89 MM HG: CPT | Mod: CPTII,S$GLB,, | Performed by: INTERNAL MEDICINE

## 2023-08-31 PROCEDURE — 1160F PR REVIEW ALL MEDS BY PRESCRIBER/CLIN PHARMACIST DOCUMENTED: ICD-10-PCS | Mod: CPTII,S$GLB,, | Performed by: INTERNAL MEDICINE

## 2023-08-31 PROCEDURE — 3044F HG A1C LEVEL LT 7.0%: CPT | Mod: CPTII,S$GLB,, | Performed by: INTERNAL MEDICINE

## 2023-08-31 PROCEDURE — 1159F MED LIST DOCD IN RCRD: CPT | Mod: CPTII,S$GLB,, | Performed by: INTERNAL MEDICINE

## 2023-08-31 PROCEDURE — 3079F PR MOST RECENT DIASTOLIC BLOOD PRESSURE 80-89 MM HG: ICD-10-PCS | Mod: CPTII,S$GLB,, | Performed by: INTERNAL MEDICINE

## 2023-08-31 PROCEDURE — 99396 PR PREVENTIVE VISIT,EST,40-64: ICD-10-PCS | Mod: S$GLB,,, | Performed by: INTERNAL MEDICINE

## 2023-08-31 PROCEDURE — 3044F PR MOST RECENT HEMOGLOBIN A1C LEVEL <7.0%: ICD-10-PCS | Mod: CPTII,S$GLB,, | Performed by: INTERNAL MEDICINE

## 2023-08-31 PROCEDURE — 99999 PR PBB SHADOW E&M-EST. PATIENT-LVL III: CPT | Mod: PBBFAC,,, | Performed by: INTERNAL MEDICINE

## 2023-08-31 PROCEDURE — 1160F RVW MEDS BY RX/DR IN RCRD: CPT | Mod: CPTII,S$GLB,, | Performed by: INTERNAL MEDICINE

## 2023-08-31 PROCEDURE — 99999 PR PBB SHADOW E&M-EST. PATIENT-LVL III: ICD-10-PCS | Mod: PBBFAC,,, | Performed by: INTERNAL MEDICINE

## 2023-08-31 PROCEDURE — 3008F BODY MASS INDEX DOCD: CPT | Mod: CPTII,S$GLB,, | Performed by: INTERNAL MEDICINE

## 2023-08-31 PROCEDURE — 3008F PR BODY MASS INDEX (BMI) DOCUMENTED: ICD-10-PCS | Mod: CPTII,S$GLB,, | Performed by: INTERNAL MEDICINE

## 2023-08-31 PROCEDURE — 99396 PREV VISIT EST AGE 40-64: CPT | Mod: S$GLB,,, | Performed by: INTERNAL MEDICINE

## 2023-08-31 PROCEDURE — 1159F PR MEDICATION LIST DOCUMENTED IN MEDICAL RECORD: ICD-10-PCS | Mod: CPTII,S$GLB,, | Performed by: INTERNAL MEDICINE

## 2023-08-31 RX ORDER — MULTIVITAMIN
1 TABLET ORAL DAILY
COMMUNITY

## 2023-08-31 RX ORDER — OMEGA-3/DHA/EPA/FISH OIL 300-1000MG
CAPSULE,DELAYED RELEASE (ENTERIC COATED) ORAL DAILY
COMMUNITY

## 2023-08-31 NOTE — PROGRESS NOTES
Subjective     Patient ID: Nilton Chin is a 63 y.o. male.    Chief Complaint: Annual Exam (Labs Done )    HPI  63 y.o. Male here for annual exam.      Vaccines: Influenza (declined); Tetanus (2015); Shingrix (will consider)  Eye exam: 2016  Colonoscopy: 2015- NL per pt     Exercise: walks 3x/wk  Diet: regular     Past Medical History:    Allergy                                                       BPH (benign prostatic hypertrophy)                            History of prostatitis                                        Hypertension                       LAMONT    Hiatal hernia    Aortic atherosclerosis                              Surgical History:   Prostatectomy(11/19)  Social History    Marital status:              Spouse name:                       Years of education:                 Number of children: 3              Occupational History  Occupation          Employer            Comment                                              Social History Main Topics    Smoking status: Former Smoker                                                                Packs/day: 0.00      Years: 35.00          Types: Cigarettes       Start date: 6/24/2013    Smokeless status: Never Used                        Comment: 6 cig a day    Alcohol use: Yes                Comment: socially/ not weekly    Drug use: No              Sexual activity: Yes               Partners with: Female      -- Codeine -- Nausea Only   -- Vicodin [Hydrocodone-Acetaminophen]     --  unknown  Review of Systems   Constitutional:  Negative for activity change, appetite change, chills, diaphoresis, fatigue, fever and unexpected weight change.   HENT:  Negative for nasal congestion, mouth sores, postnasal drip, rhinorrhea, sinus pressure/congestion, sneezing, sore throat, trouble swallowing and voice change.    Eyes:  Negative for discharge, itching and visual disturbance.   Respiratory:  Negative for cough, chest tightness, shortness of  breath and wheezing.    Cardiovascular:  Negative for chest pain, palpitations and leg swelling.   Gastrointestinal:  Negative for abdominal pain, blood in stool, constipation, diarrhea, nausea and vomiting.   Endocrine: Negative for cold intolerance and heat intolerance.   Genitourinary:  Negative for difficulty urinating, dysuria, flank pain, hematuria and urgency.   Musculoskeletal:  Negative for arthralgias, back pain, myalgias and neck pain.   Integumentary:  Negative for rash and wound.   Allergic/Immunologic: Negative for environmental allergies and food allergies.   Neurological:  Negative for dizziness, tremors, seizures, syncope, weakness and headaches.   Hematological:  Negative for adenopathy. Does not bruise/bleed easily.   Psychiatric/Behavioral:  Negative for confusion, sleep disturbance and suicidal ideas. The patient is not nervous/anxious.           Objective     Physical Exam  Constitutional:       General: He is not in acute distress.     Appearance: Normal appearance. He is well-developed. He is not ill-appearing, toxic-appearing or diaphoretic.   HENT:      Head: Normocephalic and atraumatic.      Right Ear: External ear normal.      Left Ear: External ear normal.      Nose: Nose normal.      Mouth/Throat:      Pharynx: No oropharyngeal exudate.   Eyes:      General: No scleral icterus.        Right eye: No discharge.         Left eye: No discharge.      Extraocular Movements: Extraocular movements intact.      Conjunctiva/sclera: Conjunctivae normal.      Pupils: Pupils are equal, round, and reactive to light.   Neck:      Thyroid: No thyromegaly.      Vascular: No JVD.   Cardiovascular:      Rate and Rhythm: Normal rate and regular rhythm.      Pulses: Normal pulses.      Heart sounds: Normal heart sounds. No murmur heard.  Pulmonary:      Effort: Pulmonary effort is normal. No respiratory distress.      Breath sounds: Normal breath sounds. No wheezing or rales.   Abdominal:      General:  Bowel sounds are normal. There is no distension.      Palpations: Abdomen is soft.      Tenderness: There is no abdominal tenderness. There is no right CVA tenderness, left CVA tenderness, guarding or rebound.   Musculoskeletal:      Cervical back: Normal range of motion and neck supple. No rigidity.      Right lower leg: No edema.      Left lower leg: No edema.   Lymphadenopathy:      Cervical: No cervical adenopathy.   Skin:     General: Skin is warm and dry.      Capillary Refill: Capillary refill takes less than 2 seconds.      Coloration: Skin is not pale.      Findings: No rash.   Neurological:      General: No focal deficit present.      Mental Status: He is alert and oriented to person, place, and time. Mental status is at baseline.      Cranial Nerves: No cranial nerve deficit.      Sensory: No sensory deficit.      Motor: No weakness.      Coordination: Coordination normal.      Gait: Gait normal.      Deep Tendon Reflexes: Reflexes normal.   Psychiatric:         Mood and Affect: Mood normal.         Behavior: Behavior normal.         Thought Content: Thought content normal.         Judgment: Judgment normal.            Assessment and Plan     1. Annual physical exam    2. Primary hypertension    3. Other hyperlipidemia    4. Prostate cancer    5. Aortic atherosclerosis    6. Iron deficiency anemia, unspecified iron deficiency anemia type      Blood work reviewed with pt     HTN- controlled on Hyzaar 50-12.5 mg daily     HLD- diet controlled    Prostate cancer- s/p prostatectomy(11/19)- followed by Urology    Anal fissure- stable on topical meds, followed by GI    LAMONT- resolving on iron, followed by GI    Mild aortic atherosclerosis- stable    F/u in 1 yr

## 2023-09-08 ENCOUNTER — TELEPHONE (OUTPATIENT)
Dept: ENDOSCOPY | Facility: HOSPITAL | Age: 63
End: 2023-09-08
Payer: COMMERCIAL

## 2023-09-08 VITALS — WEIGHT: 160 LBS | BODY MASS INDEX: 25.11 KG/M2 | HEIGHT: 67 IN

## 2023-09-08 DIAGNOSIS — D50.9 IRON DEFICIENCY ANEMIA, UNSPECIFIED IRON DEFICIENCY ANEMIA TYPE: Primary | ICD-10-CM

## 2023-09-08 DIAGNOSIS — Z12.11 ENCOUNTER FOR SCREENING COLONOSCOPY: Primary | ICD-10-CM

## 2023-09-08 NOTE — PLAN OF CARE
Patient has been scheduled for endoscopy procedure 10/12/2023.     My signature below certifies that the above stated patient is homebound and upon completion of the Face-To-Face encounter, has the need for intermittent skilled nursing, physical therapy and/or speech or occupational therapy services in their home for their current diagnosis as outlined in their initial plan of care. These services will continue to be monitored by myself or another physician.

## 2023-09-08 NOTE — TELEPHONE ENCOUNTER
Spoke to patient to schedule procedure(s) Colonoscopy       Physician to perform procedure(s) Dr. SANIA Jean  Date of Procedure (s) 10/12/2023  Arrival Time 11:30 am   Time of Procedure(s) 12:30 pm   Location of Procedure(s) [facility] 4th Floor  Type of Rx Prep sent to patient: PEG  Instructions provided to patient via [facility]    Patient was informed on the following information and verbalized understanding. Screening questionnaire reviewed with patient and complete. If procedure requires anesthesia, a responsible adult needs to be present to accompany the patient home, patient cannot drive after receiving anesthesia. Appointment details are tentative, especially check-in time. Patient will receive a prep-op call 4 days prior to confirm check-in time for procedure. If applicable the patient should contact their pharmacy to verify Rx for procedure prep is ready for pick-up. Patient was advised to call the scheduling department at [phone] if pharmacy states no Rx is available. Patient was advised to call the endoscopy scheduling department if any questions or concerns arise.      [facility] Scheduling Department

## 2023-09-08 NOTE — TELEPHONE ENCOUNTER
"----- Message from Jodi Hines sent at 2023  8:18 AM CDT -----    ----- Message -----  From: Almas Jean MD  Sent: 2023  11:49 AM CDT  To: [facility] Patients    Procedure: Colonoscopy    Diagnosis: Iron deficiency anemia    Procedure Timin-12 weeks    #If within 4 weeks selected, please marla as high priority#    #If greater than 12 weeks, please select "5-12 weeks" and delay sending until 2 months prior to requested date#     Provider: Myself    Location: [address]    Additional Scheduling Information: No scheduling concerns    Prep Specifications:Standard prep    Is the patient taking a GLP-1 Agonist:no    Have you attached a patient to this message: yes       "

## 2023-09-11 DIAGNOSIS — I10 PRIMARY HYPERTENSION: Chronic | ICD-10-CM

## 2023-09-11 RX ORDER — LOSARTAN POTASSIUM AND HYDROCHLOROTHIAZIDE 12.5; 5 MG/1; MG/1
1 TABLET ORAL
Qty: 90 TABLET | Refills: 3 | Status: SHIPPED | OUTPATIENT
Start: 2023-09-11

## 2023-09-11 NOTE — TELEPHONE ENCOUNTER
Contact and spoke with patient. Inform patient of Dr. Jean next available. Patient request to keep date schedule and to change the colonoscopy prep to miralax and Gatorade prep.     Patient verbalized understanding.

## 2023-10-12 ENCOUNTER — ANESTHESIA (OUTPATIENT)
Dept: ENDOSCOPY | Facility: HOSPITAL | Age: 63
End: 2023-10-12
Payer: COMMERCIAL

## 2023-10-12 ENCOUNTER — HOSPITAL ENCOUNTER (OUTPATIENT)
Facility: HOSPITAL | Age: 63
Discharge: HOME | End: 2023-10-12
Attending: INTERNAL MEDICINE | Admitting: INTERNAL MEDICINE
Payer: COMMERCIAL

## 2023-10-12 ENCOUNTER — ANESTHESIA EVENT (OUTPATIENT)
Dept: ENDOSCOPY | Facility: HOSPITAL | Age: 63
End: 2023-10-12
Payer: COMMERCIAL

## 2023-10-12 VITALS
BODY MASS INDEX: 25.58 KG/M2 | WEIGHT: 163 LBS | OXYGEN SATURATION: 98 % | HEIGHT: 67 IN | RESPIRATION RATE: 16 BRPM | SYSTOLIC BLOOD PRESSURE: 128 MMHG | DIASTOLIC BLOOD PRESSURE: 84 MMHG | HEART RATE: 68 BPM | TEMPERATURE: 98 F

## 2023-10-12 DIAGNOSIS — E61.1 IRON DEFICIENCY: ICD-10-CM

## 2023-10-12 PROCEDURE — 45385 COLONOSCOPY W/LESION REMOVAL: CPT | Mod: ,,, | Performed by: INTERNAL MEDICINE

## 2023-10-12 PROCEDURE — 25000003 PHARM REV CODE 250: Performed by: NURSE ANESTHETIST, CERTIFIED REGISTERED

## 2023-10-12 PROCEDURE — 27201089 HC SNARE, DISP (ANY): Performed by: INTERNAL MEDICINE

## 2023-10-12 PROCEDURE — E9220 PRA ENDO ANESTHESIA: HCPCS | Mod: ,,, | Performed by: NURSE ANESTHETIST, CERTIFIED REGISTERED

## 2023-10-12 PROCEDURE — 37000009 HC ANESTHESIA EA ADD 15 MINS: Performed by: INTERNAL MEDICINE

## 2023-10-12 PROCEDURE — 88305 TISSUE EXAM BY PATHOLOGIST: CPT | Mod: 26,,, | Performed by: PATHOLOGY

## 2023-10-12 PROCEDURE — 45385 COLONOSCOPY W/LESION REMOVAL: CPT | Performed by: INTERNAL MEDICINE

## 2023-10-12 PROCEDURE — 37000008 HC ANESTHESIA 1ST 15 MINUTES: Performed by: INTERNAL MEDICINE

## 2023-10-12 PROCEDURE — E9220 PRA ENDO ANESTHESIA: ICD-10-PCS | Mod: ,,, | Performed by: NURSE ANESTHETIST, CERTIFIED REGISTERED

## 2023-10-12 PROCEDURE — 88305 TISSUE EXAM BY PATHOLOGIST: ICD-10-PCS | Mod: 26,,, | Performed by: PATHOLOGY

## 2023-10-12 PROCEDURE — 63600175 PHARM REV CODE 636 W HCPCS: Performed by: NURSE ANESTHETIST, CERTIFIED REGISTERED

## 2023-10-12 PROCEDURE — 88305 TISSUE EXAM BY PATHOLOGIST: CPT | Performed by: PATHOLOGY

## 2023-10-12 PROCEDURE — 45385 PR COLONOSCOPY,REMV LESN,SNARE: ICD-10-PCS | Mod: ,,, | Performed by: INTERNAL MEDICINE

## 2023-10-12 RX ORDER — PROPOFOL 10 MG/ML
VIAL (ML) INTRAVENOUS
Status: DISCONTINUED | OUTPATIENT
Start: 2023-10-12 | End: 2023-10-12

## 2023-10-12 RX ORDER — SODIUM CHLORIDE 9 MG/ML
INJECTION, SOLUTION INTRAVENOUS CONTINUOUS
Status: DISCONTINUED | OUTPATIENT
Start: 2023-10-12 | End: 2023-10-12 | Stop reason: HOSPADM

## 2023-10-12 RX ORDER — LIDOCAINE HYDROCHLORIDE 20 MG/ML
INJECTION INTRAVENOUS
Status: DISCONTINUED | OUTPATIENT
Start: 2023-10-12 | End: 2023-10-12

## 2023-10-12 RX ADMIN — LIDOCAINE HYDROCHLORIDE 100 MG: 20 INJECTION INTRAVENOUS at 12:10

## 2023-10-12 RX ADMIN — PROPOFOL 100 MG: 10 INJECTION, EMULSION INTRAVENOUS at 12:10

## 2023-10-12 RX ADMIN — SODIUM CHLORIDE: 0.9 INJECTION, SOLUTION INTRAVENOUS at 12:10

## 2023-10-12 NOTE — ANESTHESIA PREPROCEDURE EVALUATION
10/12/2023  Nilton Chin is a 63 y.o., male.    Past Medical History:   Diagnosis Date    Acquired spondylolisthesis 08/29/2022    Allergy     Anal fissure 06/08/2016    Aortic atherosclerosis     Displacement of cervical intervertebral disc 08/29/2022    Hiatal hernia     History of prostatitis     Hypertension     Iliotibial band syndrome affecting lower leg 06/24/2015    Thoracic facet syndrome 08/29/2022     Past Surgical History:   Procedure Laterality Date    COLONOSCOPY      ESOPHAGOGASTRODUODENOSCOPY N/A 6/27/2023    Procedure: EGD (ESOPHAGOGASTRODUODENOSCOPY);  Surgeon: Almas Jean MD;  Location: [facility] ([address] [place] [address]);  Service: Endoscopy;  Laterality: N/A;  Egd with 96 hr sheppard  5-6 weeks Dr. Jean  off /ppi/no allergy yo metal or jewlery  prep instructions sent to pt via portal and email:[email or web address]  6/21 pre-call no answer; MB    PH MONITORING, ESOPHAGUS, WIRELESS, (OFF REFLUX MEDS) N/A 6/27/2023    Procedure: PH MONITORING, ESOPHAGUS, WIRELESS, (OFF REFLUX MEDS);  Surgeon: Almas Jean MD;  [facility]  Service: Endoscopy;  Laterality: N/A;    PROSTATE BIOPSY      ROBOT-ASSISTED LAPAROSCOPIC PROSTATECTOMY USING DA EMELY XI N/A 11/25/2019    Procedure: XI ROBOTIC PROSTATECTOMY;  Surgeon: Jose Carlos Lawrence MD;  [facility];  Service: Urology;  Laterality: N/A;  3hrs gen with regional     UPPER GASTROINTESTINAL ENDOSCOPY         Pre-op Assessment    I have reviewed the Patient Summary Reports.     I have reviewed the Nursing Notes. I have reviewed the NPO Status.   I have reviewed the Medications.     Review of Systems  Anesthesia Hx:  No problems with previous Anesthesia  Denies Family Hx of Anesthesia complications.   Denies Personal Hx of Anesthesia complications.   Hematology/Oncology:  Hematology Normal       -- Cancer in  past history:  Oncology Comments: Prostate ca hx     Cardiovascular:   Hypertension    Renal/:  Renal/ Normal     Hepatic/GI:   Bowel Prep. Hiatal Hernia,    Musculoskeletal:  Musculoskeletal Normal    Neurological:   Neuromuscular Disease,    Endocrine:  Endocrine Normal    Dermatological:  Skin Normal    Psych:  Psychiatric Normal           Physical Exam  General: Well nourished    Airway:  Mallampati: II   Mouth Opening: Normal  TM Distance: Normal  Tongue: Normal  Neck ROM: Normal ROM    Dental:  Intact        Anesthesia Plan  Type of Anesthesia, risks & benefits discussed:    Anesthesia Type: Gen Natural Airway  Intra-op Monitoring Plan: Standard ASA Monitors  Informed Consent: Informed consent signed with the Patient and all parties understand the risks and agree with anesthesia plan.  All questions answered.   ASA Score: 2  Day of Surgery Review of History & Physical: H&P Update referred to the surgeon/provider.I have interviewed and examined the patient. I have reviewed the patient's H&P dated: There are no significant changes.     Ready For Surgery From Anesthesia Perspective.     .

## 2023-10-12 NOTE — ANESTHESIA RELEASE NOTE
"Anesthesia Release from PACU Note    Patient: Nilton Chni    Procedure(s) Performed: Procedure(s) (LRB):  COLONOSCOPY (N/A)    Anesthesia type: general    Post pain: Adequate analgesia    Post assessment: no apparent anesthetic complications and tolerated procedure well    Last Vitals:   Visit Vitals  /84 (BP Location: Left arm, Patient Position: Lying)   Pulse 68   Temp 36.7 °C (98 °F) (Temporal)   Resp 16   Ht 5' 7" (1.702 m)   Wt 73.9 kg (163 lb)   SpO2 98%   BMI 25.53 kg/m²       Post vital signs: stable    Level of consciousness: awake and alert     Nausea/Vomiting: no nausea/no vomiting    Complications: none    Airway Patency: patent    Respiratory: unassisted, spontaneous ventilation, room air    Cardiovascular: stable and blood pressure at baseline    Hydration: euvolemic  "

## 2023-10-12 NOTE — ANESTHESIA POSTPROCEDURE EVALUATION
Anesthesia Post Evaluation    Patient: Nilton Chin    Procedure(s) Performed: Procedure(s) (LRB):  COLONOSCOPY (N/A)    Final Anesthesia Type: general      Patient location during evaluation: GI PACU  Patient participation: Yes- Able to Participate  Level of consciousness: awake and alert  Post-procedure vital signs: reviewed and stable  Pain management: adequate  Airway patency: patent    PONV status at discharge: No PONV  Anesthetic complications: no      Cardiovascular status: hemodynamically stable  Respiratory status: unassisted, spontaneous ventilation and room air  Hydration status: euvolemic  Follow-up not needed.          Vitals Value Taken Time   /84 10/12/23 1342   Temp 36.7 °C (98 °F) 10/12/23 1312   Pulse 68 10/12/23 1342   Resp 16 10/12/23 1342   SpO2 98 % 10/12/23 1342         Event Time   Out of Recovery 13:52:42         Pain/Paulina Score: Paulina Score: 10 (10/12/2023  1:27 PM)

## 2023-10-12 NOTE — TRANSFER OF CARE
"Anesthesia Transfer of Care Note    Patient: Nilton Chin    Procedure(s) Performed: Procedure(s) (LRB):  COLONOSCOPY (N/A)    Patient location: GI    Anesthesia Type: general    Transport from OR: Transported from OR on room air with adequate spontaneous ventilation    Post pain: adequate analgesia    Post assessment: no apparent anesthetic complications    Post vital signs: stable    Level of consciousness: awake    Nausea/Vomiting: no nausea/vomiting    Complications: none    Transfer of care protocol was followed      Last vitals:   Visit Vitals  /73 (BP Location: Left arm, Patient Position: Lying)   Pulse 72   Temp 36.7 °C (98 °F) (Temporal)   Resp 15   Ht 5' 7" (1.702 m)   Wt 73.9 kg (163 lb)   SpO2 99%   BMI 25.53 kg/m²     "

## 2023-10-12 NOTE — H&P
[facility]  History & Physical    Subjective:      Chief Complaint/Reason for Admission:     Colonoscopy for evaluation of iron deficiency anemia.    Nilton Chin is a 63 y.o. male.    Past Medical History:   Diagnosis Date    Acquired spondylolisthesis 08/29/2022    Allergy     Anal fissure 06/08/2016    Aortic atherosclerosis     Displacement of cervical intervertebral disc 08/29/2022    Hiatal hernia     History of prostatitis     Hypertension     Iliotibial band syndrome affecting lower leg 06/24/2015    Thoracic facet syndrome 08/29/2022     Past Surgical History:   Procedure Laterality Date    COLONOSCOPY      ESOPHAGOGASTRODUODENOSCOPY N/A 6/27/2023    Procedure: EGD (ESOPHAGOGASTRODUODENOSCOPY);  Surgeon: Almas Jean MD;  Location: [facility] (4TH FLR);  Service: Endoscopy;  Laterality: N/A;  Egd with 96 hr sheppard  5-6 weeks Dr. Jean  off /ppi/no allergy yo metal or jewlery  prep instructions sent to pt via portal and email:[email or web address]-  6/21 pre-call no answer; MB    PH MONITORING, ESOPHAGUS, WIRELESS, (OFF REFLUX MEDS) N/A 6/27/2023    Procedure: PH MONITORING, ESOPHAGUS, WIRELESS, (OFF REFLUX MEDS);  Surgeon: Almas Jean MD;  Location: [facility] (4TH FLR);  Service: Endoscopy;  Laterality: N/A;    PROSTATE BIOPSY      ROBOT-ASSISTED LAPAROSCOPIC PROSTATECTOMY USING DA EMELY XI N/A 11/25/2019    Procedure: XI ROBOTIC PROSTATECTOMY;  Surgeon: Jose Carlos Lawrence MD;  [facility];  Service: Urology;  Laterality: N/A;  3hrs gen with regional     UPPER GASTROINTESTINAL ENDOSCOPY       Family History   Problem Relation Age of Onset    Cataracts Mother     Hypertension Mother     Cataracts Father     Glaucoma Father     Hypertension Father     Colon cancer Paternal Uncle     Diabetes Maternal Grandmother     Heart disease Maternal Grandmother     Cancer Paternal Grandfather         lung/prostate    Diabetes Paternal Grandfather     Blindness Other      Amblyopia Neg Hx     Strabismus Neg Hx     Retinal detachment Neg Hx     Macular degeneration Neg Hx     Stroke Neg Hx     Melanoma Neg Hx     Esophageal cancer Neg Hx      Social History     Tobacco Use    Smoking status: Former     Types: Cigarettes     Start date: 6/24/2013    Smokeless tobacco: Never    Tobacco comments:     6 cig a day   Substance Use Topics    Alcohol use: Yes     Comment: socially/ not weekly    Drug use: No       Facility-Administered Medications Prior to Admission   Medication    sars-cov-2 (covid-19) 100 mcg/0.5 ml injection 0.5 mL     PTA Medications   Medication Sig    ferrous gluconate (FERGON) 324 MG tablet Take 1 tablet (324 mg total) by mouth daily with breakfast.    losartan-hydrochlorothiazide 50-12.5 mg (HYZAAR) 50-12.5 mg per tablet TAKE 1 TABLET BY MOUTH EVERY DAY    multivitamin (THERAGRAN) per tablet Take 1 tablet by mouth once daily.    omega 3-dha-epa-fish oil 300-1,000 mg CpDR capsule Take by mouth once daily.    sildenafiL (VIAGRA) 100 MG tablet Take 1 tablet (100 mg total) by mouth daily as needed for Erectile Dysfunction.     Review of patient's allergies indicates:   Allergen Reactions    Codeine Nausea Only    Vicodin [hydrocodone-acetaminophen]      unknown        Review of Systems   Constitutional:  Negative for fever.   Cardiovascular:  Negative for chest pain.       Objective:      Vital Signs (Most Recent)       Vital Signs Range (Last 24H):       Physical Exam  Neurological:      Mental Status: He is alert and oriented to person, place, and time.           Assessment:    Colonoscopy for evaluation of iron deficiency anemia.      Plan:      Colonoscopy for evaluation of iron deficiency anemia.

## 2023-10-12 NOTE — PROVATION PATIENT INSTRUCTIONS
Discharge Summary/Instructions after an Endoscopic Procedure  Patient Name: Nilton Chin  Patient MRN: [id number]  Patient YOB: 1960  Thursday, October 12, 2023  Almas Jean MD  Dear patient,  As a result of recent federal legislation (The Federal Cures Act), you may   receive lab or pathology results from your procedure in your [facility]   account before your physician is able to contact you. Your physician or   their representative will relay the results to you with their   recommendations at their soonest availability.  Thank you,  RESTRICTIONS:  During your procedure today, you received medications for sedation.  These   medications may affect your judgment, balance and coordination.  Therefore,   for 24 hours, you have the following restrictions:   - DO NOT drive a car, operate machinery, make legal/financial decisions,   sign important papers or drink alcohol.    ACTIVITY:  Today: no heavy lifting, straining or running due to procedural   sedation/anesthesia.  The following day: return to full activity including work.  DIET:  Eat and drink normally unless instructed otherwise.     TREATMENT FOR COMMON SIDE EFFECTS:  - Mild abdominal pain, nausea, belching, bloating or excessive gas:  rest,   eat lightly and use a heating pad.  - Sore Throat: treat with throat lozenges and/or gargle with warm salt   water.  - Because air was used during the procedure, expelling large amounts of air   from your rectum or belching is normal.  - If a bowel prep was taken, you may not have a bowel movement for 1-3 days.    This is normal.  SYMPTOMS TO WATCH FOR AND REPORT TO YOUR PHYSICIAN:  1. Abdominal pain or bloating, other than gas cramps.  2. Chest pain.  3. Back pain.  4. Signs of infection such as: chills or fever occurring within 24 hours   after the procedure.  5. Rectal bleeding, which would show as bright red, maroon, or black stools.   (A tablespoon of blood from the rectum is not serious, especially  if   hemorrhoids are present.)  6. Vomiting.  7. Weakness or dizziness.  GO DIRECTLY TO THE NEAREST EMERGENCY ROOM IF YOU HAVE ANY OF THE FOLLOWING:      Difficulty breathing              Chills and/or fever over 101 F   Persistent vomiting and/or vomiting blood   Severe abdominal pain   Severe chest pain   Black, tarry stools   Bleeding- more than one tablespoon   Any other symptom or condition that you feel may need urgent attention  Your doctor recommends these additional instructions:  If any biopsies were taken, your doctors clinic will contact you in 1 to 2   weeks with any results.  - Discharge patient to home.   - Await pathology results.   - Telephone endoscopist for pathology results in 3 weeks.   - Repeat colonoscopy in 5 years for surveillance based on pathology results.     - Return to GI clinic at the next available appointment.   - The findings and recommendations were discussed with the patient.  For questions, problems or results please call your physician - Almas Jean MD at Work:  [phone].  [facility], EMERGENCY ROOM PHONE NUMBER: [phone]  IF A COMPLICATION OR EMERGENCY SITUATION ARISES AND YOU ARE UNABLE TO REACH   YOUR PHYSICIAN - GO DIRECTLY TO THE EMERGENCY ROOM.  Almas Jean MD  10/12/2023 1:12:04 PM  This report has been verified and signed electronically.  Dear patient,  As a result of recent federal legislation (The Federal Cures Act), you may   receive lab or pathology results from your procedure in your [facility]   account before your physician is able to contact you. Your physician or   their representative will relay the results to you with their   recommendations at their soonest availability.  Thank you,  PROVATION

## 2023-10-18 LAB
FINAL PATHOLOGIC DIAGNOSIS: NORMAL
GROSS: NORMAL
Lab: NORMAL

## 2023-10-29 NOTE — PROGRESS NOTES
Peter your colonoscopy pathology was benign recommend your next surveillance colonoscopy in 5 years    Rectum, polyp, biopsy   Hyperplastic polyp.    Comment: Interp By Precious Reeves M.D., Signed on 10/18/2023

## 2023-10-30 ENCOUNTER — LAB VISIT (OUTPATIENT)
Dept: LAB | Facility: HOSPITAL | Age: 63
End: 2023-10-30
Attending: INTERNAL MEDICINE
Payer: COMMERCIAL

## 2023-10-30 DIAGNOSIS — D50.9 IRON DEFICIENCY ANEMIA, UNSPECIFIED IRON DEFICIENCY ANEMIA TYPE: ICD-10-CM

## 2023-10-30 LAB
FERRITIN SERPL-MCNC: 85 NG/ML (ref 20–300)
HGB BLD-MCNC: 13.4 G/DL (ref 14–18)
IRON SERPL-MCNC: 111 UG/DL (ref 45–160)
SATURATED IRON: 27 % (ref 20–50)
TOTAL IRON BINDING CAPACITY: 406 UG/DL (ref 250–450)
TRANSFERRIN SERPL-MCNC: 274 MG/DL (ref 200–375)

## 2023-10-30 PROCEDURE — 82728 ASSAY OF FERRITIN: CPT | Performed by: INTERNAL MEDICINE

## 2023-10-30 PROCEDURE — 84466 ASSAY OF TRANSFERRIN: CPT | Performed by: INTERNAL MEDICINE

## 2023-10-30 PROCEDURE — 85018 HEMOGLOBIN: CPT | Performed by: INTERNAL MEDICINE

## 2023-10-30 PROCEDURE — 36415 COLL VENOUS BLD VENIPUNCTURE: CPT | Mod: PO | Performed by: INTERNAL MEDICINE

## 2023-10-30 PROCEDURE — 83540 ASSAY OF IRON: CPT | Performed by: INTERNAL MEDICINE

## 2023-11-04 ENCOUNTER — PATIENT MESSAGE (OUTPATIENT)
Dept: GASTROENTEROLOGY | Facility: CLINIC | Age: 63
End: 2023-11-04
Payer: COMMERCIAL

## 2023-11-04 DIAGNOSIS — D50.9 IRON DEFICIENCY ANEMIA, UNSPECIFIED IRON DEFICIENCY ANEMIA TYPE: ICD-10-CM

## 2023-11-04 RX ORDER — FERROUS GLUCONATE 324(38)MG
324 TABLET ORAL
Qty: 90 TABLET | Refills: 1 | Status: SHIPPED | OUTPATIENT
Start: 2023-11-04 | End: 2024-02-03 | Stop reason: SDUPTHER

## 2023-11-04 NOTE — PROGRESS NOTES
Please schedule Nilton for in person clinic visit next available to discuss possible small-bowel video capsule endoscopy for further evaluation of his iron deficiency anemia.  GI MA team - please tell patient that they are  anemic and recommend that they take ferrous gluconate one 324mg pill once daily for next 3 months.    GI MA team -  Please order repeat fasting Hemoglobin, Iron/TIBC, and Ferritin in 8 weeks - Orders placed.

## 2023-11-27 ENCOUNTER — TELEPHONE (OUTPATIENT)
Dept: DERMATOLOGY | Facility: CLINIC | Age: 63
End: 2023-11-27
Payer: COMMERCIAL

## 2023-11-27 NOTE — TELEPHONE ENCOUNTER
----- Message from Janet Celis LPN sent at 11/22/2023  1:17 PM CST -----  Regarding: FW: appt /letter  Contact: [phone]    ----- Message -----  From: Sameera Borrero  Sent: 11/22/2023   9:31 AM CST  To: Rosanne MOISE Staff  Subject: appt /letter                                     Pt received a letter stating it is time for a follow up no available dates please call and adv @ [phone] per the patient he is off on Wednesday so those are better days

## 2023-11-29 ENCOUNTER — PATIENT MESSAGE (OUTPATIENT)
Dept: DERMATOLOGY | Facility: CLINIC | Age: 63
End: 2023-11-29
Payer: COMMERCIAL

## 2023-12-05 ENCOUNTER — PATIENT MESSAGE (OUTPATIENT)
Dept: GASTROENTEROLOGY | Facility: CLINIC | Age: 63
End: 2023-12-05
Payer: COMMERCIAL

## 2023-12-05 ENCOUNTER — HOSPITAL ENCOUNTER (OUTPATIENT)
Dept: RADIOLOGY | Facility: HOSPITAL | Age: 63
Discharge: HOME | End: 2023-12-05
Attending: INTERNAL MEDICINE
Payer: COMMERCIAL

## 2023-12-05 DIAGNOSIS — K76.89 LIVER CYST: ICD-10-CM

## 2023-12-05 PROCEDURE — 76705 ECHO EXAM OF ABDOMEN: CPT | Mod: 26,,, | Performed by: RADIOLOGY

## 2023-12-05 PROCEDURE — 76705 ECHO EXAM OF ABDOMEN: CPT | Mod: TC

## 2023-12-05 PROCEDURE — 76705 US ABDOMEN LIMITED: ICD-10-PCS | Mod: 26,,, | Performed by: RADIOLOGY

## 2023-12-06 NOTE — PROGRESS NOTES
Peter great news your liver cyst is a benign simple cyst    Impression:    Right hepatic simple cyst measuring up to 0.6 cm.      Electronically signed by: Jose Carlos Cloud MD  Date:                                            12/05/2023

## 2023-12-12 ENCOUNTER — OFFICE VISIT (OUTPATIENT)
Dept: RADIATION ONCOLOGY | Facility: CLINIC | Age: 63
End: 2023-12-12
Payer: COMMERCIAL

## 2023-12-12 VITALS
HEART RATE: 68 BPM | HEIGHT: 67 IN | BODY MASS INDEX: 24.97 KG/M2 | SYSTOLIC BLOOD PRESSURE: 131 MMHG | DIASTOLIC BLOOD PRESSURE: 85 MMHG | WEIGHT: 159.06 LBS | RESPIRATION RATE: 16 BRPM

## 2023-12-12 DIAGNOSIS — C61 PROSTATE CANCER: Primary | ICD-10-CM

## 2023-12-12 DIAGNOSIS — N52.31 ERECTILE DYSFUNCTION FOLLOWING RADICAL PROSTATECTOMY: ICD-10-CM

## 2023-12-12 PROCEDURE — 3044F PR MOST RECENT HEMOGLOBIN A1C LEVEL <7.0%: ICD-10-PCS | Mod: CPTII,S$GLB,, | Performed by: RADIOLOGY

## 2023-12-12 PROCEDURE — 3079F PR MOST RECENT DIASTOLIC BLOOD PRESSURE 80-89 MM HG: ICD-10-PCS | Mod: CPTII,S$GLB,, | Performed by: RADIOLOGY

## 2023-12-12 PROCEDURE — 3075F SYST BP GE 130 - 139MM HG: CPT | Mod: CPTII,S$GLB,, | Performed by: RADIOLOGY

## 2023-12-12 PROCEDURE — 3044F HG A1C LEVEL LT 7.0%: CPT | Mod: CPTII,S$GLB,, | Performed by: RADIOLOGY

## 2023-12-12 PROCEDURE — 99212 OFFICE O/P EST SF 10 MIN: CPT | Mod: S$GLB,,, | Performed by: RADIOLOGY

## 2023-12-12 PROCEDURE — 1159F MED LIST DOCD IN RCRD: CPT | Mod: CPTII,S$GLB,, | Performed by: RADIOLOGY

## 2023-12-12 PROCEDURE — 1159F PR MEDICATION LIST DOCUMENTED IN MEDICAL RECORD: ICD-10-PCS | Mod: CPTII,S$GLB,, | Performed by: RADIOLOGY

## 2023-12-12 PROCEDURE — 3008F PR BODY MASS INDEX (BMI) DOCUMENTED: ICD-10-PCS | Mod: CPTII,S$GLB,, | Performed by: RADIOLOGY

## 2023-12-12 PROCEDURE — 3079F DIAST BP 80-89 MM HG: CPT | Mod: CPTII,S$GLB,, | Performed by: RADIOLOGY

## 2023-12-12 PROCEDURE — 1160F PR REVIEW ALL MEDS BY PRESCRIBER/CLIN PHARMACIST DOCUMENTED: ICD-10-PCS | Mod: CPTII,S$GLB,, | Performed by: RADIOLOGY

## 2023-12-12 PROCEDURE — 3075F PR MOST RECENT SYSTOLIC BLOOD PRESS GE 130-139MM HG: ICD-10-PCS | Mod: CPTII,S$GLB,, | Performed by: RADIOLOGY

## 2023-12-12 PROCEDURE — 3008F BODY MASS INDEX DOCD: CPT | Mod: CPTII,S$GLB,, | Performed by: RADIOLOGY

## 2023-12-12 PROCEDURE — 1160F RVW MEDS BY RX/DR IN RCRD: CPT | Mod: CPTII,S$GLB,, | Performed by: RADIOLOGY

## 2023-12-12 PROCEDURE — 99212 PR OFFICE/OUTPT VISIT, EST, LEVL II, 10-19 MIN: ICD-10-PCS | Mod: S$GLB,,, | Performed by: RADIOLOGY

## 2023-12-12 PROCEDURE — 99999 PR PBB SHADOW E&M-EST. PATIENT-LVL III: CPT | Mod: PBBFAC,,, | Performed by: RADIOLOGY

## 2023-12-12 PROCEDURE — 99999 PR PBB SHADOW E&M-EST. PATIENT-LVL III: ICD-10-PCS | Mod: PBBFAC,,, | Performed by: RADIOLOGY

## 2023-12-12 RX ORDER — SILDENAFIL 100 MG/1
100 TABLET, FILM COATED ORAL DAILY PRN
Qty: 30 TABLET | Refills: 6 | Status: SHIPPED | OUTPATIENT
Start: 2023-12-12 | End: 2024-07-09

## 2023-12-12 NOTE — PROGRESS NOTES
Multidisciplinary Uro-Oncology Clinic  [facility] - Radiation Oncology    Patient ID: Nilton Chin is a 63 y.o. male.    Chief Complaint: Prostate Cancer (Yrly eval; psa)    Mr. Chin has a history of pathologic Stage IIIB (pT3a, pN0, cM0, PSA: 4.4, Grade Group: 2) adenocarcinoma of the prostate.  The patient initially presented in August of 2019 with a slightly elevated screening PSA of 4.4 ng/ml.  Biopsies revealed [place] 8 (4+4) adenocarcinoma in the Rt. mid gland, [place] 7 (3+4) adenocarcinoma at the Lt. apex, Rt. apex and Rt. base and [place] 6 (3+3) disease at the Lt. mid gland.  He underwent RALP with bilateral tori dissection on 11/25/19.  Pathology revealed [place] 7 (3+4) adenocarcinoma with extraprostatic extension posteriorly at the apex.  There was no seminal vesicle or urinary bladder neck invasion.  The margins of resection were negative.  Two Rt. and 1 Lt. pelvic nodes were negative for tumor involvement.  He was recently referred for salvage irradiation after his PSA increased to 0.2 ng/ml.  He received a 6 month Lupron injection on 12/10/20.  He completed salvage radiotherapy to the prostate bed and pelvic nodes on 4/1/21. He has remained ABHISHEK since that time.       Review of Systems   Constitutional:  Negative for activity change, appetite change, chills, diaphoresis and fatigue.   Respiratory:  Negative for cough and shortness of breath.    Gastrointestinal:  Negative for abdominal pain, constipation, diarrhea and fecal incontinence.   Genitourinary:  Negative for bladder incontinence, difficulty urinating, dysuria, erectile dysfunction, frequency and hematuria.     Physical Exam  Constitutional:       General: He is not in acute distress.     Appearance: Normal appearance.   Abdominal:      General: Abdomen is flat. There is no distension.   Neurological:      Mental Status: He is alert and oriented to person, place, and time.   Psychiatric:         Mood and  Affect: Mood normal.         Judgment: Judgment normal.        Latest Reference Range & Units 12/05/23 08:31   PSA Diagnostic 0.00 - 4.00 ng/mL <0.01        Assessment and Plan     1. Prostate cancer  Doing well No evidence of tumor recurrence or progression.  Plan repeat PSA in 6 months with phone review RTC in 1 year with psa

## 2024-01-08 ENCOUNTER — LAB VISIT (OUTPATIENT)
Dept: LAB | Facility: HOSPITAL | Age: 64
End: 2024-01-08
Attending: INTERNAL MEDICINE
Payer: COMMERCIAL

## 2024-01-08 DIAGNOSIS — D50.9 IRON DEFICIENCY ANEMIA, UNSPECIFIED IRON DEFICIENCY ANEMIA TYPE: ICD-10-CM

## 2024-01-08 LAB
FERRITIN SERPL-MCNC: 93 NG/ML (ref 20–300)
HGB BLD-MCNC: 13.4 G/DL (ref 14–18)
IRON SERPL-MCNC: 96 UG/DL (ref 45–160)
SATURATED IRON: 24 % (ref 20–50)
TOTAL IRON BINDING CAPACITY: 408 UG/DL (ref 250–450)
TRANSFERRIN SERPL-MCNC: 276 MG/DL (ref 200–375)

## 2024-01-08 PROCEDURE — 36415 COLL VENOUS BLD VENIPUNCTURE: CPT | Mod: PO | Performed by: INTERNAL MEDICINE

## 2024-01-08 PROCEDURE — 83540 ASSAY OF IRON: CPT | Performed by: INTERNAL MEDICINE

## 2024-01-08 PROCEDURE — 85018 HEMOGLOBIN: CPT | Performed by: INTERNAL MEDICINE

## 2024-01-08 PROCEDURE — 82728 ASSAY OF FERRITIN: CPT | Performed by: INTERNAL MEDICINE

## 2024-02-03 DIAGNOSIS — D50.9 IRON DEFICIENCY ANEMIA, UNSPECIFIED IRON DEFICIENCY ANEMIA TYPE: ICD-10-CM

## 2024-02-03 RX ORDER — FERROUS GLUCONATE 324(38)MG
324 TABLET ORAL
Qty: 90 TABLET | Refills: 1 | Status: SHIPPED | OUTPATIENT
Start: 2024-02-03 | End: 2024-04-29 | Stop reason: SDUPTHER

## 2024-02-03 NOTE — PROGRESS NOTES
GI MA team - please tell patient that they are  still slightly anemic and recommend that they take ferrous gluconate one 324mg pill once daily for next 6  months.    GI MA team -  Please order repeat fasting Hemoglobin, Iron/TIBC, and Ferritin in 12 weeks - Orders placed.

## 2024-02-29 ENCOUNTER — OFFICE VISIT (OUTPATIENT)
Dept: PODIATRY | Facility: CLINIC | Age: 64
End: 2024-02-29
Payer: COMMERCIAL

## 2024-02-29 VITALS
DIASTOLIC BLOOD PRESSURE: 86 MMHG | HEIGHT: 67 IN | HEART RATE: 79 BPM | WEIGHT: 159 LBS | BODY MASS INDEX: 24.96 KG/M2 | SYSTOLIC BLOOD PRESSURE: 135 MMHG

## 2024-02-29 DIAGNOSIS — M79.671 BILATERAL FOOT PAIN: Primary | ICD-10-CM

## 2024-02-29 DIAGNOSIS — M79.672 BILATERAL FOOT PAIN: Primary | ICD-10-CM

## 2024-02-29 PROCEDURE — 3008F BODY MASS INDEX DOCD: CPT | Mod: CPTII,S$GLB,, | Performed by: PODIATRIST

## 2024-02-29 PROCEDURE — 99999 PR PBB SHADOW E&M-EST. PATIENT-LVL III: CPT | Mod: PBBFAC,,, | Performed by: PODIATRIST

## 2024-02-29 PROCEDURE — 99203 OFFICE O/P NEW LOW 30 MIN: CPT | Mod: S$GLB,,, | Performed by: PODIATRIST

## 2024-02-29 PROCEDURE — 1160F RVW MEDS BY RX/DR IN RCRD: CPT | Mod: CPTII,S$GLB,, | Performed by: PODIATRIST

## 2024-02-29 PROCEDURE — 1159F MED LIST DOCD IN RCRD: CPT | Mod: CPTII,S$GLB,, | Performed by: PODIATRIST

## 2024-02-29 PROCEDURE — 3075F SYST BP GE 130 - 139MM HG: CPT | Mod: CPTII,S$GLB,, | Performed by: PODIATRIST

## 2024-02-29 PROCEDURE — 3079F DIAST BP 80-89 MM HG: CPT | Mod: CPTII,S$GLB,, | Performed by: PODIATRIST

## 2024-02-29 NOTE — PROGRESS NOTES
"Subjective:     Patient ID: Nilton Chin is a 63 y.o. male.    Chief Complaint: Foot Pain (right)     Presents today complaining of intermittent pain pointing between the 2nd and 3rd toes of the right foot which was more pronounced a couple months ago and noted especially while he was driving and pressing on the accelerator.  States that he would drive about 8 miles to work every day.  When he would get out of the car and walk the parking lot the pain would go away.  He wears dress shoes most days but notes that also when he purchased a new pair of [employer] athletic shoes to walk for exercise that a lot of his symptoms began to improve.  No pain reported today.  Denies any significant changes in activity or trauma.  Also states he would get intermittent pain along the arches of both feet but has not had some for some time.    Vitals:    02/29/24 1446   BP: 135/86   Pulse: 79   Weight: 72.1 kg (159 lb)   Height: 5' 7" (1.702 m)   PainSc:   4      Past Medical History:   Diagnosis Date    Acquired spondylolisthesis 08/29/2022    Allergy     Anal fissure 06/08/2016    Aortic atherosclerosis     Displacement of cervical intervertebral disc 08/29/2022    Hiatal hernia     History of prostatitis     Hypertension     Iliotibial band syndrome affecting lower leg 06/24/2015    Thoracic facet syndrome 08/29/2022       Past Surgical History:   Procedure Laterality Date    COLONOSCOPY      COLONOSCOPY N/A 10/12/2023    Procedure: COLONOSCOPY;  Surgeon: Almas Jean MD;  Location: [address]);  Service: Endoscopy;  Laterality: N/A;  9/8- referred by Dr. Jean/ Prep instrucitons to portal >AS  10/6-precall complete-MS    ESOPHAGOGASTRODUODENOSCOPY N/A 6/27/2023    Procedure: EGD (ESOPHAGOGASTRODUODENOSCOPY);  Surgeon: Almas Jean MD;  Location: [facility] ([address] [place] [address]);  Service: Endoscopy;  Laterality: N/A;  Egd with 96 hr sheppard  5-6 weeks Dr. Jean  off /ppi/no allergy yo metal or jewlery  prep instructions " sent to pt via portal and email:[email or web address]  6/21 pre-call no answer; MB    PH MONITORING, ESOPHAGUS, WIRELESS, (OFF REFLUX MEDS) N/A 6/27/2023    Procedure: PH MONITORING, ESOPHAGUS, WIRELESS, (OFF REFLUX MEDS);  Surgeon: Almas Jean MD;  Location: [facility] (4TH FLR);  Service: Endoscopy;  Laterality: N/A;    PROSTATE BIOPSY      ROBOT-ASSISTED LAPAROSCOPIC PROSTATECTOMY USING DA EMELY XI N/A 11/25/2019    Procedure: XI ROBOTIC PROSTATECTOMY;  Surgeon: Jose Carlos Lawrence MD;  Location: [facility] OR 2ND FLR;  Service: Urology;  Laterality: N/A;  3hrs gen with regional     UPPER GASTROINTESTINAL ENDOSCOPY         Family History   Problem Relation Age of Onset    Cataracts Mother     Hypertension Mother     Cataracts Father     Glaucoma Father     Hypertension Father     Colon cancer Paternal Uncle     Diabetes Maternal Grandmother     Heart disease Maternal Grandmother     Cancer Paternal Grandfather         lung/prostate    Diabetes Paternal Grandfather     Blindness Other     Amblyopia Neg Hx     Strabismus Neg Hx     Retinal detachment Neg Hx     Macular degeneration Neg Hx     Stroke Neg Hx     Melanoma Neg Hx     Esophageal cancer Neg Hx        Social History     Socioeconomic History    Marital status:     Number of children: 3   Occupational History    Occupation:    Tobacco Use    Smoking status: Former     Types: Cigarettes     Start date: 6/24/2013    Smokeless tobacco: Never    Tobacco comments:     6 cig a day   Substance and Sexual Activity    Alcohol use: Yes     Comment: socially/ not weekly    Drug use: No    Sexual activity: Yes     Partners: Female       Current Outpatient Medications   Medication Sig Dispense Refill    ferrous gluconate (FERGON) 324 MG tablet Take 1 tablet (324 mg total) by mouth daily with breakfast. 90 tablet 1    losartan-hydrochlorothiazide 50-12.5 mg (HYZAAR) 50-12.5 mg per tablet TAKE 1 TABLET BY MOUTH EVERY DAY 90 tablet 3    multivitamin  (THERAGRAN) per tablet Take 1 tablet by mouth once daily.      omega 3-dha-epa-fish oil 300-1,000 mg CpDR capsule Take by mouth once daily.      sildenafiL (VIAGRA) 100 MG tablet Take 1 tablet (100 mg total) by mouth daily as needed for Erectile Dysfunction. 30 tablet 6     Current Facility-Administered Medications   Medication Dose Route Frequency Provider Last Rate Last Admin    sars-cov-2 (covid-19) 100 mcg/0.5 ml injection 0.5 mL  0.5 mL Intramuscular 1 time in Clinic/HOD Renetta Baig RN           Review of patient's allergies indicates:   Allergen Reactions    Codeine Nausea Only    Vicodin [hydrocodone-acetaminophen]      unknown         Review of Systems   Constitutional: Negative for chills, fever and malaise/fatigue.   Cardiovascular:  Negative for chest pain, claudication and leg swelling.   Respiratory:  Negative for cough and shortness of breath.    Musculoskeletal:  Negative for back pain, joint pain, muscle cramps and muscle weakness.   Gastrointestinal:  Negative for nausea and vomiting.   Neurological:  Negative for numbness, paresthesias and weakness.   Psychiatric/Behavioral:  Negative for altered mental status.         Objective:     Physical Exam  Constitutional:       General: He is not in acute distress.     Appearance: He is not ill-appearing.   Musculoskeletal:      Comments:   Slight discomfort the distal aspect of the 2nd intermetatarsal space and no pain on palpation to the distal 3rd or 4th and negative Nicolasa sign right forefoot.  No pain stress dorsiflexion/Lachman test MTPs 2-4 right foot.  No pain with manual muscle strength testing or range motion bilateral foot and ankle.  Right ankle range motion is noted to be normal and left ankle with some mild limitation noting a proximally 0° of dorsiflexion with the knee extended that improves to greater than 10° with knee flexion.  Overall rectus appearing foot type bilateral foot.   Skin:     General: Skin is warm.      Capillary  Refill: Capillary refill takes less than 2 seconds.      Findings: No ecchymosis or erythema.      Nails: There is no clubbing.   Neurological:      Mental Status: He is alert and oriented to person, place, and time.      Sensory: Sensation is intact.      Motor: Motor function is intact.           Assessment:      Encounter Diagnosis   Name Primary?    Bilateral foot pain Yes     Plan:     Nilton was seen today for foot pain.    Diagnoses and all orders for this visit:    Bilateral foot pain      I counseled the patient on his conditions, their implications and medical management.      From a clinical standpoint as I discussed with the patient appears as though it may be shoe gear issue/ fit that is causing irritation within the 2nd intermetatarsal space which may have given him neuroma like symptoms before however they appeared to have improved since the change his shoe gear.  We discussed appropriate sizing shoes in detail I have recommended that patient try a professional shoe store such as [employer] for assistance with getting appropriate style shoes to support his feet and prevent further irritation.      RTC p.r.n. as discussed.  No imaging indicated today since there has no significant reproducible signs and symptoms.    Assisted by Leandra Interiano DPM PGY 2    A portion of this note was generated by voice recognition software and may contain spelling and grammar errors.      .

## 2024-04-17 ENCOUNTER — OFFICE VISIT (OUTPATIENT)
Dept: OPTOMETRY | Facility: CLINIC | Age: 64
End: 2024-04-17
Payer: COMMERCIAL

## 2024-04-17 DIAGNOSIS — H25.13 NUCLEAR SCLEROSIS, BILATERAL: Primary | ICD-10-CM

## 2024-04-17 DIAGNOSIS — H52.4 PRESBYOPIA: ICD-10-CM

## 2024-04-17 DIAGNOSIS — H04.123 DRY EYES, BILATERAL: ICD-10-CM

## 2024-04-17 PROCEDURE — 1159F MED LIST DOCD IN RCRD: CPT | Mod: CPTII,S$GLB,, | Performed by: OPTOMETRIST

## 2024-04-17 PROCEDURE — 1160F RVW MEDS BY RX/DR IN RCRD: CPT | Mod: CPTII,S$GLB,, | Performed by: OPTOMETRIST

## 2024-04-17 PROCEDURE — 99999 PR PBB SHADOW E&M-EST. PATIENT-LVL III: CPT | Mod: PBBFAC,,, | Performed by: OPTOMETRIST

## 2024-04-17 PROCEDURE — 92015 DETERMINE REFRACTIVE STATE: CPT | Mod: S$GLB,,, | Performed by: OPTOMETRIST

## 2024-04-17 PROCEDURE — 92014 COMPRE OPH EXAM EST PT 1/>: CPT | Mod: S$GLB,,, | Performed by: OPTOMETRIST

## 2024-04-17 NOTE — PROGRESS NOTES
HPI    63 Y/o female is here for routine eye exam with C/o 2 SUB CONJ HEMES LAST   MONTH, WHICH RESOLVED    Pt denies pain and discomfort   No f/f    Eye med: systane complete  Last edited by Arnulfo Blake, OD on 4/17/2024  2:06 PM.            Assessment /Plan     For exam results, see Encounter Report.    Nuclear sclerosis, bilateral    Presbyopia    Dry eyes, bilateral      1. Small cats--pt happy w otc readers.  Discussed could use otc +1.00 for distance  2. Family History of Glaucoma (dad).  Advised yearly exam   3. Ocular rosacea hx--see prev notes--oral tx in past.  For now will try switch to SYSTANE COMPLETE Ats QID  4. Pt had 2 episodes of subconj heme last month, which resolved.        PLAN:    rtc 1 yr

## 2024-04-29 ENCOUNTER — LAB VISIT (OUTPATIENT)
Dept: LAB | Facility: HOSPITAL | Age: 64
End: 2024-04-29
Attending: INTERNAL MEDICINE
Payer: COMMERCIAL

## 2024-04-29 DIAGNOSIS — D50.9 IRON DEFICIENCY ANEMIA, UNSPECIFIED IRON DEFICIENCY ANEMIA TYPE: ICD-10-CM

## 2024-04-29 LAB
FERRITIN SERPL-MCNC: 109 NG/ML (ref 20–300)
HGB BLD-MCNC: 13.7 G/DL (ref 14–18)
IRON SERPL-MCNC: 102 UG/DL (ref 45–160)
SATURATED IRON: 26 % (ref 20–50)
TOTAL IRON BINDING CAPACITY: 392 UG/DL (ref 250–450)
TRANSFERRIN SERPL-MCNC: 265 MG/DL (ref 200–375)

## 2024-04-29 PROCEDURE — 83540 ASSAY OF IRON: CPT | Performed by: INTERNAL MEDICINE

## 2024-04-29 PROCEDURE — 82728 ASSAY OF FERRITIN: CPT | Performed by: INTERNAL MEDICINE

## 2024-04-29 PROCEDURE — 85018 HEMOGLOBIN: CPT | Performed by: INTERNAL MEDICINE

## 2024-04-29 PROCEDURE — 36415 COLL VENOUS BLD VENIPUNCTURE: CPT | Mod: PO | Performed by: INTERNAL MEDICINE

## 2024-04-29 RX ORDER — FERROUS GLUCONATE 324(38)MG
324 TABLET ORAL
Qty: 90 TABLET | Refills: 1 | Status: SHIPPED | OUTPATIENT
Start: 2024-04-29 | End: 2024-10-26

## 2024-05-26 ENCOUNTER — PATIENT MESSAGE (OUTPATIENT)
Dept: INTERNAL MEDICINE | Facility: CLINIC | Age: 64
End: 2024-05-26
Payer: COMMERCIAL

## 2024-05-26 DIAGNOSIS — I10 PRIMARY HYPERTENSION: ICD-10-CM

## 2024-05-26 DIAGNOSIS — E78.49 OTHER HYPERLIPIDEMIA: ICD-10-CM

## 2024-05-26 DIAGNOSIS — Z00.00 ANNUAL PHYSICAL EXAM: Primary | ICD-10-CM

## 2024-06-03 ENCOUNTER — OFFICE VISIT (OUTPATIENT)
Dept: DERMATOLOGY | Facility: CLINIC | Age: 64
End: 2024-06-03
Payer: COMMERCIAL

## 2024-06-03 DIAGNOSIS — D22.9 NEVUS: ICD-10-CM

## 2024-06-03 DIAGNOSIS — D18.01 CHERRY ANGIOMA: ICD-10-CM

## 2024-06-03 DIAGNOSIS — L81.4 LENTIGO: ICD-10-CM

## 2024-06-03 DIAGNOSIS — L82.1 SK (SEBORRHEIC KERATOSIS): ICD-10-CM

## 2024-06-03 DIAGNOSIS — L57.0 AK (ACTINIC KERATOSIS): Primary | ICD-10-CM

## 2024-06-03 PROCEDURE — 99999 PR PBB SHADOW E&M-EST. PATIENT-LVL III: CPT | Mod: PBBFAC,,, | Performed by: DERMATOLOGY

## 2024-06-03 PROCEDURE — 17003 DESTRUCT PREMALG LES 2-14: CPT | Mod: S$GLB,,, | Performed by: DERMATOLOGY

## 2024-06-03 PROCEDURE — 1160F RVW MEDS BY RX/DR IN RCRD: CPT | Mod: CPTII,S$GLB,, | Performed by: DERMATOLOGY

## 2024-06-03 PROCEDURE — 1159F MED LIST DOCD IN RCRD: CPT | Mod: CPTII,S$GLB,, | Performed by: DERMATOLOGY

## 2024-06-03 PROCEDURE — 17000 DESTRUCT PREMALG LESION: CPT | Mod: S$GLB,,, | Performed by: DERMATOLOGY

## 2024-06-03 PROCEDURE — 99213 OFFICE O/P EST LOW 20 MIN: CPT | Mod: 25,S$GLB,, | Performed by: DERMATOLOGY

## 2024-06-03 NOTE — PROGRESS NOTES
"  Subjective:      Patient ID:  Nilton Chin is a 64 y.o. male who presents for   Chief Complaint   Patient presents with    Skin Check     UBSE     Pt here today for UBSE    Patient is here today for a "mole" check.   Pt has a history of  moderate sun exposure in the past.   Pt recalls several blistering sunburns in the past- no  Pt has history of tanning bed use- no  Pt has  had moles removed in the past- no  Pt has history of melanoma in first degree relatives-  no        Review of Systems   Skin:  Positive for activity-related sunscreen use. Negative for daily sunscreen use, tendency to form keloidal scars and recent sunburn.   Hematologic/Lymphatic: Does not bruise/bleed easily.       Objective:   Physical Exam   Constitutional: He appears well-developed and well-nourished. No distress.   Neurological: He is alert and oriented to person, place, and time. He is not disoriented.   Psychiatric: He has a normal mood and affect.   Skin:   Areas Examined (abnormalities noted in diagram):   Scalp / Hair Palpated and Inspected  Head / Face Inspection Performed  Neck Inspection Performed  Chest / Axilla Inspection Performed  Abdomen Inspection Performed  Back Inspection Performed  RUE Inspected  LUE Inspection Performed  Nails and Digits Inspection Performed                     Diagram Legend     Erythematous scaling macule/papule c/w actinic keratosis       Vascular papule c/w angioma      Pigmented verrucoid papule/plaque c/w seborrheic keratosis      Yellow umbilicated papule c/w sebaceous hyperplasia      Irregularly shaped tan macule c/w lentigo     1-2 mm smooth white papules consistent with Milia      Movable subcutaneous cyst with punctum c/w epidermal inclusion cyst      Subcutaneous movable cyst c/w pilar cyst      Firm pink to brown papule c/w dermatofibroma      Pedunculated fleshy papule(s) c/w skin tag(s)      Evenly pigmented macule c/w junctional nevus     Mildly variegated pigmented, slightly " irregular-bordered macule c/w mildly atypical nevus      Flesh colored to evenly pigmented papule c/w intradermal nevus       Pink pearly papule/plaque c/w basal cell carcinoma      Erythematous hyperkeratotic cursted plaque c/w SCC      Surgical scar with no sign of skin cancer recurrence      Open and closed comedones      Inflammatory papules and pustules      Verrucoid papule consistent consistent with wart     Erythematous eczematous patches and plaques     Dystrophic onycholytic nail with subungual debris c/w onychomycosis     Umbilicated papule    Erythematous-base heme-crusted tan verrucoid plaque consistent with inflamed seborrheic keratosis     Erythematous Silvery Scaling Plaque c/w Psoriasis     See annotation      Assessment / Plan:        AK (actinic keratosis)  Cryosurgery Procedure Note    Verbal consent from the patient is obtained including, but not limited to, risk of hypopigmentation/hyperpigmentation, scar, recurrence of lesion. The patient is aware of the precancerous quality and need for treatment of these lesions. Liquid nitrogen cryosurgery is applied to the 2 actinic keratoses, as detailed in the physical exam, to produce a freeze injury. The patient is aware that blisters may form and is instructed on wound care with gentle cleansing and use of vaseline ointment to keep moist until healed. The patient is supplied a handout on cryosurgery and is instructed to call if lesions do not completely resolve.    SK (seborrheic keratosis)  These are benign inherited growths without a malignant potential. Reassurance given to patient. No treatment is necessary.     Lentigo  This is a benign hyperpigmented sun induced lesion. Recommend daily sun protection/avoidance and use of at least SPF 30, broad spectrum sunscreen (OTC drug) will reduce the number of new lesions. Treatment of these benign lesions are considered cosmetic.  The nature of sun-induced photo-aging and skin cancers is discussed.  Sun  avoidance, protective clothing, and the use of 30-SPF sunscreens is advised. Observe closely for skin damage/changes, and call if such occurs.    Cherry angioma  These are benign vascular lesions that are inherited.  Treatment is not necessary.    Nevus  Discussed ABCDE's of nevi.  Monitor for new mole or moles that are becoming bigger, darker, irritated, or developing irregular borders. Brochure provided. Instructed patient to observe lesion(s) for changes and follow up in clinic if changes are noted. Patient to monitor skin at home for new or changing lesions.       Upper body skin examination performed today including at least 6 points as noted in physical examination. No lesions suspicious for malignancy noted.    Recommend daily sun protection/avoidance and use of at least SPF 30, broad spectrum sunscreen (OTC drug).              Follow up in about 2 years (around 6/3/2026) for UBSE.

## 2024-06-20 ENCOUNTER — PATIENT MESSAGE (OUTPATIENT)
Dept: GASTROENTEROLOGY | Facility: CLINIC | Age: 64
End: 2024-06-20
Payer: COMMERCIAL

## 2024-06-20 ENCOUNTER — LAB VISIT (OUTPATIENT)
Dept: LAB | Facility: HOSPITAL | Age: 64
End: 2024-06-20
Attending: RADIOLOGY
Payer: COMMERCIAL

## 2024-06-20 DIAGNOSIS — C61 PROSTATE CANCER: ICD-10-CM

## 2024-06-20 DIAGNOSIS — D50.9 IRON DEFICIENCY ANEMIA, UNSPECIFIED IRON DEFICIENCY ANEMIA TYPE: ICD-10-CM

## 2024-06-20 LAB
COMPLEXED PSA SERPL-MCNC: <0.01 NG/ML (ref 0–4)
FERRITIN SERPL-MCNC: 150 NG/ML (ref 20–300)
HGB BLD-MCNC: 13 G/DL (ref 14–18)
IRON SERPL-MCNC: 102 UG/DL (ref 45–160)
SATURATED IRON: 28 % (ref 20–50)
TOTAL IRON BINDING CAPACITY: 370 UG/DL (ref 250–450)
TRANSFERRIN SERPL-MCNC: 250 MG/DL (ref 200–375)

## 2024-06-20 PROCEDURE — 85018 HEMOGLOBIN: CPT | Performed by: INTERNAL MEDICINE

## 2024-06-20 PROCEDURE — 83540 ASSAY OF IRON: CPT | Performed by: INTERNAL MEDICINE

## 2024-06-20 PROCEDURE — 84153 ASSAY OF PSA TOTAL: CPT | Performed by: RADIOLOGY

## 2024-06-20 PROCEDURE — 36415 COLL VENOUS BLD VENIPUNCTURE: CPT | Performed by: RADIOLOGY

## 2024-06-20 PROCEDURE — 82728 ASSAY OF FERRITIN: CPT | Performed by: INTERNAL MEDICINE

## 2024-06-23 ENCOUNTER — PATIENT MESSAGE (OUTPATIENT)
Dept: GASTROENTEROLOGY | Facility: CLINIC | Age: 64
End: 2024-06-23
Payer: COMMERCIAL

## 2024-06-23 DIAGNOSIS — D64.9 ANEMIA, UNSPECIFIED TYPE: Primary | ICD-10-CM

## 2024-07-23 ENCOUNTER — LAB VISIT (OUTPATIENT)
Dept: LAB | Facility: HOSPITAL | Age: 64
End: 2024-07-23
Attending: INTERNAL MEDICINE
Payer: COMMERCIAL

## 2024-07-23 DIAGNOSIS — D64.9 ANEMIA, UNSPECIFIED TYPE: ICD-10-CM

## 2024-07-23 LAB
FERRITIN SERPL-MCNC: 121 NG/ML (ref 20–300)
HGB BLD-MCNC: 14 G/DL (ref 14–18)
IRON SERPL-MCNC: 87 UG/DL (ref 45–160)
SATURATED IRON: 21 % (ref 20–50)
TOTAL IRON BINDING CAPACITY: 423 UG/DL (ref 250–450)
TRANSFERRIN SERPL-MCNC: 286 MG/DL (ref 200–375)

## 2024-07-23 PROCEDURE — 85018 HEMOGLOBIN: CPT | Performed by: INTERNAL MEDICINE

## 2024-07-23 PROCEDURE — 86258 DGP ANTIBODY EACH IG CLASS: CPT | Mod: 59 | Performed by: INTERNAL MEDICINE

## 2024-07-23 PROCEDURE — 86364 TISS TRNSGLTMNASE EA IG CLAS: CPT | Performed by: INTERNAL MEDICINE

## 2024-07-23 PROCEDURE — 82728 ASSAY OF FERRITIN: CPT | Performed by: INTERNAL MEDICINE

## 2024-07-23 PROCEDURE — 84466 ASSAY OF TRANSFERRIN: CPT | Performed by: INTERNAL MEDICINE

## 2024-07-23 PROCEDURE — 36415 COLL VENOUS BLD VENIPUNCTURE: CPT | Mod: PO | Performed by: INTERNAL MEDICINE

## 2024-07-23 PROCEDURE — 83021 HEMOGLOBIN CHROMOTOGRAPHY: CPT | Performed by: INTERNAL MEDICINE

## 2024-07-23 PROCEDURE — 83540 ASSAY OF IRON: CPT | Performed by: INTERNAL MEDICINE

## 2024-07-25 LAB
HGB A2 MFR BLD HPLC: 2.3 % (ref 2.2–3.2)
HGB FRACT BLD ELPH-IMP: NORMAL
HGB FRACT BLD ELPH-IMP: NORMAL

## 2024-07-26 LAB
GLIADIN PEPTIDE IGA SER-ACNC: 0.4 U/ML
GLIADIN PEPTIDE IGG SER-ACNC: <0.6 U/ML
IGA SERPL-MCNC: 121 MG/DL (ref 70–400)
TTG IGA SER-ACNC: 0.2 U/ML
TTG IGG SER-ACNC: <0.6 U/ML

## 2024-08-17 ENCOUNTER — PATIENT MESSAGE (OUTPATIENT)
Dept: GASTROENTEROLOGY | Facility: CLINIC | Age: 64
End: 2024-08-17
Payer: COMMERCIAL

## 2024-08-28 ENCOUNTER — LAB VISIT (OUTPATIENT)
Dept: LAB | Facility: HOSPITAL | Age: 64
End: 2024-08-28
Attending: INTERNAL MEDICINE
Payer: COMMERCIAL

## 2024-08-28 DIAGNOSIS — E78.49 OTHER HYPERLIPIDEMIA: ICD-10-CM

## 2024-08-28 DIAGNOSIS — Z00.00 ANNUAL PHYSICAL EXAM: ICD-10-CM

## 2024-08-28 DIAGNOSIS — I10 PRIMARY HYPERTENSION: ICD-10-CM

## 2024-08-28 LAB
ALBUMIN SERPL BCP-MCNC: 4.1 G/DL (ref 3.5–5.2)
ALP SERPL-CCNC: 53 U/L (ref 55–135)
ALT SERPL W/O P-5'-P-CCNC: 22 U/L (ref 10–44)
ANION GAP SERPL CALC-SCNC: 8 MMOL/L (ref 8–16)
AST SERPL-CCNC: 21 U/L (ref 10–40)
BASOPHILS # BLD AUTO: 0.03 K/UL (ref 0–0.2)
BASOPHILS NFR BLD: 0.6 % (ref 0–1.9)
BILIRUB SERPL-MCNC: 0.9 MG/DL (ref 0.1–1)
BUN SERPL-MCNC: 19 MG/DL (ref 8–23)
CALCIUM SERPL-MCNC: 9.4 MG/DL (ref 8.7–10.5)
CHLORIDE SERPL-SCNC: 103 MMOL/L (ref 95–110)
CHOLEST SERPL-MCNC: 208 MG/DL (ref 120–199)
CHOLEST/HDLC SERPL: 3.6 {RATIO} (ref 2–5)
CO2 SERPL-SCNC: 28 MMOL/L (ref 23–29)
COMPLEXED PSA SERPL-MCNC: <0.01 NG/ML (ref 0–4)
CREAT SERPL-MCNC: 1.2 MG/DL (ref 0.5–1.4)
DIFFERENTIAL METHOD BLD: ABNORMAL
EOSINOPHIL # BLD AUTO: 0.1 K/UL (ref 0–0.5)
EOSINOPHIL NFR BLD: 1.5 % (ref 0–8)
ERYTHROCYTE [DISTWIDTH] IN BLOOD BY AUTOMATED COUNT: 12.2 % (ref 11.5–14.5)
EST. GFR  (NO RACE VARIABLE): >60 ML/MIN/1.73 M^2
ESTIMATED AVG GLUCOSE: 111 MG/DL (ref 68–131)
GLUCOSE SERPL-MCNC: 99 MG/DL (ref 70–110)
HBA1C MFR BLD: 5.5 % (ref 4–5.6)
HCT VFR BLD AUTO: 42.8 % (ref 40–54)
HDLC SERPL-MCNC: 58 MG/DL (ref 40–75)
HDLC SERPL: 27.9 % (ref 20–50)
HGB BLD-MCNC: 13.7 G/DL (ref 14–18)
IMM GRANULOCYTES # BLD AUTO: 0.01 K/UL (ref 0–0.04)
IMM GRANULOCYTES NFR BLD AUTO: 0.2 % (ref 0–0.5)
LDLC SERPL CALC-MCNC: 128.4 MG/DL (ref 63–159)
LYMPHOCYTES # BLD AUTO: 1.2 K/UL (ref 1–4.8)
LYMPHOCYTES NFR BLD: 25.5 % (ref 18–48)
MCH RBC QN AUTO: 31.4 PG (ref 27–31)
MCHC RBC AUTO-ENTMCNC: 32 G/DL (ref 32–36)
MCV RBC AUTO: 98 FL (ref 82–98)
MONOCYTES # BLD AUTO: 0.5 K/UL (ref 0.3–1)
MONOCYTES NFR BLD: 9.4 % (ref 4–15)
NEUTROPHILS # BLD AUTO: 3 K/UL (ref 1.8–7.7)
NEUTROPHILS NFR BLD: 62.8 % (ref 38–73)
NONHDLC SERPL-MCNC: 150 MG/DL
NRBC BLD-RTO: 0 /100 WBC
PLATELET # BLD AUTO: 176 K/UL (ref 150–450)
PMV BLD AUTO: 12.8 FL (ref 9.2–12.9)
POTASSIUM SERPL-SCNC: 4.7 MMOL/L (ref 3.5–5.1)
PROT SERPL-MCNC: 6.9 G/DL (ref 6–8.4)
RBC # BLD AUTO: 4.36 M/UL (ref 4.6–6.2)
SODIUM SERPL-SCNC: 139 MMOL/L (ref 136–145)
TRIGL SERPL-MCNC: 108 MG/DL (ref 30–150)
TSH SERPL DL<=0.005 MIU/L-ACNC: 3.13 UIU/ML (ref 0.4–4)
WBC # BLD AUTO: 4.79 K/UL (ref 3.9–12.7)

## 2024-08-28 PROCEDURE — 84153 ASSAY OF PSA TOTAL: CPT | Performed by: INTERNAL MEDICINE

## 2024-08-28 PROCEDURE — 80061 LIPID PANEL: CPT | Performed by: INTERNAL MEDICINE

## 2024-08-28 PROCEDURE — 80053 COMPREHEN METABOLIC PANEL: CPT | Performed by: INTERNAL MEDICINE

## 2024-08-28 PROCEDURE — 85025 COMPLETE CBC W/AUTO DIFF WBC: CPT | Performed by: INTERNAL MEDICINE

## 2024-08-28 PROCEDURE — 83036 HEMOGLOBIN GLYCOSYLATED A1C: CPT | Performed by: INTERNAL MEDICINE

## 2024-08-28 PROCEDURE — 84443 ASSAY THYROID STIM HORMONE: CPT | Performed by: INTERNAL MEDICINE

## 2024-09-04 ENCOUNTER — OFFICE VISIT (OUTPATIENT)
Dept: INTERNAL MEDICINE | Facility: CLINIC | Age: 64
End: 2024-09-04
Payer: COMMERCIAL

## 2024-09-04 VITALS
RESPIRATION RATE: 20 BRPM | BODY MASS INDEX: 25.35 KG/M2 | HEIGHT: 67 IN | SYSTOLIC BLOOD PRESSURE: 132 MMHG | OXYGEN SATURATION: 99 % | HEART RATE: 67 BPM | WEIGHT: 161.5 LBS | DIASTOLIC BLOOD PRESSURE: 81 MMHG

## 2024-09-04 DIAGNOSIS — C61 PROSTATE CANCER: ICD-10-CM

## 2024-09-04 DIAGNOSIS — D50.9 IRON DEFICIENCY ANEMIA, UNSPECIFIED IRON DEFICIENCY ANEMIA TYPE: ICD-10-CM

## 2024-09-04 DIAGNOSIS — I70.0 AORTIC ATHEROSCLEROSIS: ICD-10-CM

## 2024-09-04 DIAGNOSIS — Z82.49 FAMILY HISTORY OF HEART DISEASE: ICD-10-CM

## 2024-09-04 DIAGNOSIS — Z13.6 ENCOUNTER FOR SCREENING FOR CARDIOVASCULAR DISORDERS: ICD-10-CM

## 2024-09-04 DIAGNOSIS — Z00.00 ANNUAL PHYSICAL EXAM: Primary | ICD-10-CM

## 2024-09-04 DIAGNOSIS — I10 PRIMARY HYPERTENSION: Chronic | ICD-10-CM

## 2024-09-04 DIAGNOSIS — E78.49 OTHER HYPERLIPIDEMIA: ICD-10-CM

## 2024-09-04 PROCEDURE — 99396 PREV VISIT EST AGE 40-64: CPT | Mod: S$GLB,,, | Performed by: INTERNAL MEDICINE

## 2024-09-04 PROCEDURE — 1159F MED LIST DOCD IN RCRD: CPT | Mod: CPTII,S$GLB,, | Performed by: INTERNAL MEDICINE

## 2024-09-04 PROCEDURE — 3079F DIAST BP 80-89 MM HG: CPT | Mod: CPTII,S$GLB,, | Performed by: INTERNAL MEDICINE

## 2024-09-04 PROCEDURE — 3008F BODY MASS INDEX DOCD: CPT | Mod: CPTII,S$GLB,, | Performed by: INTERNAL MEDICINE

## 2024-09-04 PROCEDURE — 3044F HG A1C LEVEL LT 7.0%: CPT | Mod: CPTII,S$GLB,, | Performed by: INTERNAL MEDICINE

## 2024-09-04 PROCEDURE — 3075F SYST BP GE 130 - 139MM HG: CPT | Mod: CPTII,S$GLB,, | Performed by: INTERNAL MEDICINE

## 2024-09-04 PROCEDURE — 99999 PR PBB SHADOW E&M-EST. PATIENT-LVL III: CPT | Mod: PBBFAC,,, | Performed by: INTERNAL MEDICINE

## 2024-09-04 PROCEDURE — 1160F RVW MEDS BY RX/DR IN RCRD: CPT | Mod: CPTII,S$GLB,, | Performed by: INTERNAL MEDICINE

## 2024-09-04 RX ORDER — LOSARTAN POTASSIUM AND HYDROCHLOROTHIAZIDE 12.5; 5 MG/1; MG/1
1 TABLET ORAL DAILY
Qty: 90 TABLET | Refills: 3 | Status: SHIPPED | OUTPATIENT
Start: 2024-09-04

## 2024-09-04 NOTE — PROGRESS NOTES
Subjective     Patient ID: Nilton Chin is a 64 y.o. male.    Chief Complaint: Annual Exam    HPI  64 y.o. Male here for annual exam.      Vaccines: Influenza (declined); Tetanus (2015); Shingrix (2022)  Eye exam: 2016  Colonoscopy: 10/23     Exercise: walks 3x/wk  Diet: regular     Past Medical History:    Allergy                                                       BPH (benign prostatic hypertrophy)                            History of prostatitis                                        Hypertension                       LAMONT    Hiatal hernia    Aortic atherosclerosis                              Surgical History:   Prostatectomy(11/19)  Social History    Marital status:              Spouse name:                       Years of education:                 Number of children: 3              Occupational History  Occupation          Employer            Comment                                              Social History Main Topics    Smoking status: Former Smoker                                                                Packs/day: 0.00      Years: 35.00          Types: Cigarettes       Start date: 6/24/2013    Smokeless status: Never Used                        Comment: 6 cig a day    Alcohol use: Yes                Comment: socially/ not weekly    Drug use: No              Sexual activity: Yes               Partners with: Female      -- Codeine -- Nausea Only   -- Vicodin [Hydrocodone-Acetaminophen]     --  unknown  Review of Systems   Constitutional:  Negative for activity change, appetite change, chills, diaphoresis, fatigue, fever and unexpected weight change.   HENT:  Negative for nasal congestion, mouth sores, postnasal drip, rhinorrhea, sinus pressure/congestion, sneezing, sore throat, trouble swallowing and voice change.    Eyes:  Negative for discharge, itching and visual disturbance.   Respiratory:  Negative for cough, chest tightness, shortness of breath and wheezing.     Cardiovascular:  Negative for chest pain, palpitations and leg swelling.   Gastrointestinal:  Negative for abdominal pain, blood in stool, constipation, diarrhea, nausea and vomiting.   Endocrine: Negative for cold intolerance and heat intolerance.   Genitourinary:  Negative for difficulty urinating, dysuria, flank pain, hematuria and urgency.   Musculoskeletal:  Negative for arthralgias, back pain, myalgias and neck pain.   Integumentary:  Negative for rash and wound.   Allergic/Immunologic: Negative for environmental allergies and food allergies.   Neurological:  Negative for dizziness, tremors, seizures, syncope, weakness and headaches.   Hematological:  Negative for adenopathy. Does not bruise/bleed easily.   Psychiatric/Behavioral:  Negative for confusion, sleep disturbance and suicidal ideas. The patient is not nervous/anxious.           Objective     Physical Exam  Constitutional:       General: He is not in acute distress.     Appearance: Normal appearance. He is well-developed. He is not ill-appearing, toxic-appearing or diaphoretic.   HENT:      Head: Normocephalic and atraumatic.      Right Ear: External ear normal.      Left Ear: External ear normal.      Nose: Nose normal.      Mouth/Throat:      Pharynx: No oropharyngeal exudate.   Eyes:      General: No scleral icterus.        Right eye: No discharge.         Left eye: No discharge.      Extraocular Movements: Extraocular movements intact.      Conjunctiva/sclera: Conjunctivae normal.      Pupils: Pupils are equal, round, and reactive to light.   Neck:      Thyroid: No thyromegaly.      Vascular: No JVD.   Cardiovascular:      Rate and Rhythm: Normal rate and regular rhythm.      Pulses: Normal pulses.      Heart sounds: Normal heart sounds. No murmur heard.  Pulmonary:      Effort: Pulmonary effort is normal. No respiratory distress.      Breath sounds: Normal breath sounds. No wheezing or rales.   Abdominal:      General: Bowel sounds are normal.  There is no distension.      Palpations: Abdomen is soft.      Tenderness: There is no abdominal tenderness. There is no right CVA tenderness, left CVA tenderness, guarding or rebound.   Musculoskeletal:      Cervical back: Normal range of motion and neck supple. No rigidity.      Right lower leg: No edema.      Left lower leg: No edema.   Lymphadenopathy:      Cervical: No cervical adenopathy.   Skin:     General: Skin is warm and dry.      Capillary Refill: Capillary refill takes less than 2 seconds.      Coloration: Skin is not pale.      Findings: No rash.   Neurological:      General: No focal deficit present.      Mental Status: He is alert and oriented to person, place, and time. Mental status is at baseline.      Cranial Nerves: No cranial nerve deficit.      Sensory: No sensory deficit.      Motor: No weakness.      Coordination: Coordination normal.      Gait: Gait normal.      Deep Tendon Reflexes: Reflexes normal.   Psychiatric:         Mood and Affect: Mood normal.         Behavior: Behavior normal.         Thought Content: Thought content normal.         Judgment: Judgment normal.            Assessment and Plan     1. Annual physical exam    2. Primary hypertension  -     losartan-hydrochlorothiazide 50-12.5 mg (HYZAAR) 50-12.5 mg per tablet; Take 1 tablet by mouth once daily.  Dispense: 90 tablet; Refill: 3    3. Other hyperlipidemia    4. Prostate cancer    5. Iron deficiency anemia, unspecified iron deficiency anemia type    6. Aortic atherosclerosis    7. Encounter for screening for cardiovascular disorders  -     CT Cardiac Scoring; Future; Expected date: 09/04/2024    8. Family history of heart disease  -     CT Cardiac Scoring; Future; Expected date: 09/04/2024        Blood work reviewed with pt     HTN- controlled on Hyzaar 50-12.5 mg daily      HLD- diet controlled     Prostate cancer- s/p prostatectomy(11/19)- followed by Urology     Anal fissure- stable on topical meds, followed by GI      LAMONT- resolving on iron, followed by GI     Mild aortic atherosclerosis- stable     F/u in 1 yr

## 2024-09-18 ENCOUNTER — HOSPITAL ENCOUNTER (OUTPATIENT)
Dept: RADIOLOGY | Facility: HOSPITAL | Age: 64
Discharge: HOME | End: 2024-09-18
Attending: INTERNAL MEDICINE
Payer: COMMERCIAL

## 2024-09-18 DIAGNOSIS — Z13.6 ENCOUNTER FOR SCREENING FOR CARDIOVASCULAR DISORDERS: ICD-10-CM

## 2024-09-18 DIAGNOSIS — Z82.49 FAMILY HISTORY OF HEART DISEASE: ICD-10-CM

## 2024-09-18 PROCEDURE — 75571 CT HRT W/O DYE W/CA TEST: CPT | Mod: TC

## 2024-09-18 PROCEDURE — 75571 CT HRT W/O DYE W/CA TEST: CPT | Mod: 26,,, | Performed by: RADIOLOGY

## 2024-10-01 ENCOUNTER — PATIENT MESSAGE (OUTPATIENT)
Dept: INTERNAL MEDICINE | Facility: CLINIC | Age: 64
End: 2024-10-01
Payer: COMMERCIAL

## 2024-10-09 ENCOUNTER — HOSPITAL ENCOUNTER (OUTPATIENT)
Dept: CARDIOLOGY | Facility: HOSPITAL | Age: 64
Discharge: HOME | End: 2024-10-09
Attending: INTERNAL MEDICINE
Payer: COMMERCIAL

## 2024-10-09 DIAGNOSIS — R93.1 ELEVATED CORONARY ARTERY CALCIUM SCORE: ICD-10-CM

## 2024-10-09 LAB
ASCENDING AORTA: 3.56 CM
CV ECHO LV RWT: 0.39 CM
CV STRESS BASE HR: 78 BPM
DIASTOLIC BLOOD PRESSURE: 92 MMHG
DOP CALC LVOT AREA: 3.1 CM2
DOP CALC LVOT DIAMETER: 2 CM
DOP CALC LVOT PEAK VEL: 1.3 M/S
DOP CALC LVOT STROKE VOLUME: 83.5 CM3
DOP CALCLVOT PEAK VEL VTI: 26.6 CM
E WAVE DECELERATION TIME: 220.26 MSEC
E/A RATIO: 1.18
E/E' RATIO: 8.6 M/S
ECHO LV POSTERIOR WALL: 0.7 CM (ref 0.6–1.1)
FRACTIONAL SHORTENING: 27.8 % (ref 28–44)
INTERVENTRICULAR SEPTUM: 0.7 CM (ref 0.6–1.1)
LA MAJOR: 5.64 CM
LA MINOR: 5.63 CM
LA WIDTH: 3.05 CM
LEFT ATRIUM SIZE: 3.59 CM
LEFT ATRIUM VOLUME MOD: 42.4 ML
LEFT ATRIUM VOLUME: 52.45 CM3
LEFT INTERNAL DIMENSION IN SYSTOLE: 2.6 CM (ref 2.1–4)
LEFT VENTRICLE DIASTOLIC VOLUME: 54.08 ML
LEFT VENTRICLE SYSTOLIC VOLUME: 25.55 ML
LEFT VENTRICULAR INTERNAL DIMENSION IN DIASTOLE: 3.6 CM (ref 3.5–6)
LEFT VENTRICULAR MASS: 65.8 G
LV LATERAL E/E' RATIO: 7.82 M/S
LV SEPTAL E/E' RATIO: 9.56 M/S
MV A" WAVE DURATION": 13.13 MSEC
MV PEAK A VEL: 0.73 M/S
MV PEAK E VEL: 0.86 M/S
MV STENOSIS PRESSURE HALF TIME: 63.88 MS
MV VALVE AREA P 1/2 METHOD: 3.44 CM2
OHS CV CPX 1 MINUTE RECOVERY HEART RATE: 91 BPM
OHS CV CPX 85 PERCENT MAX PREDICTED HEART RATE MALE: 133
OHS CV CPX ESTIMATED METS: 8
OHS CV CPX MAX PREDICTED HEART RATE: 157
OHS CV CPX PATIENT IS FEMALE: 0
OHS CV CPX PATIENT IS MALE: 1
OHS CV CPX PEAK DIASTOLIC BLOOD PRESSURE: 83 MMHG
OHS CV CPX PEAK HEAR RATE: 176 BPM
OHS CV CPX PEAK RATE PRESSURE PRODUCT: ABNORMAL
OHS CV CPX PEAK SYSTOLIC BLOOD PRESSURE: 207 MMHG
OHS CV CPX PERCENT MAX PREDICTED HEART RATE ACHIEVED: 112
OHS CV CPX RATE PRESSURE PRODUCT PRESENTING: ABNORMAL
OHS CV RV/LV RATIO: 0.61 CM
PISA TR MAX VEL: 2.98 M/S
PULM VEIN S/D RATIO: 1.34
PV PEAK D VEL: 0.59 M/S
PV PEAK S VEL: 0.79 M/S
RA MAJOR: 4.79 CM
RA PRESSURE ESTIMATED: 3 MMHG
RA WIDTH: 3.07 CM
RIGHT VENTRICLE DIASTOLIC BASEL DIMENSION: 2.2 CM
RV TB RVSP: 6 MMHG
SINUS: 3.12 CM
STJ: 3.15 CM
STRESS ECHO POST EXERCISE DUR MIN: 11 MINUTES
STRESS ECHO POST EXERCISE DUR SEC: 46 SECONDS
STRESS ST DEPRESSION: 1.1 MM
SYSTOLIC BLOOD PRESSURE: 168 MMHG
TDI LATERAL: 0.11 M/S
TDI SEPTAL: 0.09 M/S
TDI: 0.1 M/S
TR MAX PG: 36 MMHG
TRICUSPID ANNULAR PLANE SYSTOLIC EXCURSION: 2 CM
TV REST PULMONARY ARTERY PRESSURE: 39 MMHG

## 2024-10-09 PROCEDURE — 93351 STRESS TTE COMPLETE: CPT | Mod: 26,,, | Performed by: STUDENT IN AN ORGANIZED HEALTH CARE EDUCATION/TRAINING PROGRAM

## 2024-10-09 PROCEDURE — 93351 STRESS TTE COMPLETE: CPT | Mod: PO

## 2024-10-10 ENCOUNTER — TELEPHONE (OUTPATIENT)
Dept: INTERNAL MEDICINE | Facility: CLINIC | Age: 64
End: 2024-10-10
Payer: COMMERCIAL

## 2024-10-10 DIAGNOSIS — R94.39 POSITIVE CARDIAC STRESS TEST: Primary | ICD-10-CM

## 2024-10-10 NOTE — TELEPHONE ENCOUNTER
Spoke with patient and discussed CT results. Informed pt of need for urgent cards referral and scheduled appt for tomorrow at [facility]. Pt stated he has a personal hx with Dr Olsen and requested message sent to his office, message sent.

## 2024-10-11 ENCOUNTER — OFFICE VISIT (OUTPATIENT)
Dept: CARDIOLOGY | Facility: CLINIC | Age: 64
End: 2024-10-11
Payer: COMMERCIAL

## 2024-10-11 VITALS
BODY MASS INDEX: 25.17 KG/M2 | OXYGEN SATURATION: 100 % | SYSTOLIC BLOOD PRESSURE: 121 MMHG | DIASTOLIC BLOOD PRESSURE: 81 MMHG | HEIGHT: 67 IN | HEART RATE: 74 BPM | WEIGHT: 160.38 LBS

## 2024-10-11 DIAGNOSIS — R94.39 POSITIVE CARDIAC STRESS TEST: ICD-10-CM

## 2024-10-11 DIAGNOSIS — E78.5 HYPERLIPIDEMIA, UNSPECIFIED HYPERLIPIDEMIA TYPE: ICD-10-CM

## 2024-10-11 DIAGNOSIS — I10 HYPERTENSION, UNSPECIFIED TYPE: Primary | ICD-10-CM

## 2024-10-11 PROCEDURE — 1159F MED LIST DOCD IN RCRD: CPT | Mod: CPTII,S$GLB,, | Performed by: INTERNAL MEDICINE

## 2024-10-11 PROCEDURE — 99204 OFFICE O/P NEW MOD 45 MIN: CPT | Mod: S$GLB,,, | Performed by: INTERNAL MEDICINE

## 2024-10-11 PROCEDURE — 3079F DIAST BP 80-89 MM HG: CPT | Mod: CPTII,S$GLB,, | Performed by: INTERNAL MEDICINE

## 2024-10-11 PROCEDURE — 3008F BODY MASS INDEX DOCD: CPT | Mod: CPTII,S$GLB,, | Performed by: INTERNAL MEDICINE

## 2024-10-11 PROCEDURE — 3074F SYST BP LT 130 MM HG: CPT | Mod: CPTII,S$GLB,, | Performed by: INTERNAL MEDICINE

## 2024-10-11 PROCEDURE — 99999 PR PBB SHADOW E&M-EST. PATIENT-LVL IV: CPT | Mod: PBBFAC,,, | Performed by: INTERNAL MEDICINE

## 2024-10-11 PROCEDURE — 3044F HG A1C LEVEL LT 7.0%: CPT | Mod: CPTII,S$GLB,, | Performed by: INTERNAL MEDICINE

## 2024-10-15 NOTE — PROGRESS NOTES
Subjective:   @Patient ID:  Nilton Chin is a 64 y.o. male who presents for evaluation of No chief complaint on file.      HPI:        Patient is a 64-year-old male with active medical problems including  hypertension on losartan/HCTZ, hyperlipidemia with LDL of 128, on Crestor 20 mg daily, A1c of 5.5, calcium score of to 0 8, some exertional shortness of breath, recently had  exercise stress test in which he completed 8 Mets, no significant anginal symptoms, echocardiographic images show no concern for ischemia but had significant horizontal ST segment depressions with peak exercise.  Now in the cardiology clinic for follow up.  Patient has good exercise capacity, previously had history of CA prostate that has been treated.       Euvolemic on examination.  No classical angina symptoms.  We have discussed false-positive findings of EKG  During stress test    No results found for this or any previous visit.      No results found for this or any previous visit.      No results found for this or any previous visit.      Please document below the medical necessity for continuous telemetry monitoring or discontinue the current order if appropriate.    Current rhythm from flowsheet:               Patient Active Problem List    Diagnosis Date Noted    Aortic atherosclerosis 08/31/2023    Iron deficiency anemia 08/31/2023    Displacement of lumbar intervertebral disc without myelopathy 08/29/2022    Pelvic floor dysfunction 08/24/2022    Urgency of urination 04/26/2022    Erectile dysfunction following radical prostatectomy 01/16/2020    Prostate cancer 10/17/2019    Other hyperlipidemia 08/21/2019    Cervical radiculopathy 04/13/2015    Hypertension 04/13/2015                    LAST HbA1c  Lab Results   Component Value Date    HGBA1C 5.5 08/28/2024       Lipid panel  Lab Results   Component Value Date    CHOL 208 (H) 08/28/2024    CHOL 202 (H) 07/27/2023    CHOL 244 (H) 08/19/2022     Lab Results   Component Value Date     HDL 58 08/28/2024    HDL 53 07/27/2023    HDL 54 08/19/2022     Lab Results   Component Value Date    LDLCALC 128.4 08/28/2024    LDLCALC 128.6 07/27/2023    LDLCALC 169.2 (H) 08/19/2022     Lab Results   Component Value Date    TRIG 108 08/28/2024    TRIG 102 07/27/2023    TRIG 104 08/19/2022     Lab Results   Component Value Date    CHOLHDL 27.9 08/28/2024    CHOLHDL 26.2 07/27/2023    CHOLHDL 22.1 08/19/2022            Review of Systems   Constitutional: Negative for chills and fever.   HENT:  Negative for hearing loss and nosebleeds.    Eyes:  Negative for blurred vision.   Cardiovascular:  Negative for chest pain, leg swelling and palpitations.   Respiratory:  Negative for hemoptysis and shortness of breath.    Hematologic/Lymphatic: Negative for bleeding problem.   Skin:  Negative for itching.   Musculoskeletal:  Negative for falls.   Gastrointestinal:  Negative for abdominal pain and hematochezia.   Genitourinary:  Negative for hematuria.   Neurological:  Negative for dizziness and loss of balance.   Psychiatric/Behavioral:  Negative for altered mental status and depression.        Objective:   Physical Exam  Constitutional:       Appearance: He is well-developed.   HENT:      Head: Normocephalic and atraumatic.   Eyes:      Conjunctiva/sclera: Conjunctivae normal.   Neck:      Vascular: No carotid bruit or JVD.   Cardiovascular:      Rate and Rhythm: Normal rate and regular rhythm.      Pulses:           Carotid pulses are 2+ on the right side and 2+ on the left side.       Radial pulses are 2+ on the right side and 2+ on the left side.      Heart sounds: Murmur heard.      No friction rub. No gallop.   Pulmonary:      Effort: Pulmonary effort is normal. No respiratory distress.      Breath sounds: Normal breath sounds. No stridor. No wheezing.   Musculoskeletal:      Cervical back: Neck supple.   Skin:     General: Skin is warm and dry.   Neurological:      Mental Status: He is alert and oriented to  person, place, and time.   Psychiatric:         Behavior: Behavior normal.         Assessment:     1. Positive cardiac stress test        Plan:     -  good exercise capacity.  No classical symptoms of angina.  Able to do 8 Mets.  Recommend regular exercise.  Risk factor modification has been initiated already.  Blood pressure mostly stays within normal limits.  -   Recommend rechecking  lipid panel in 6 months from starting Crestor.  Goal LDL should be less than 100 considering some dynamic EKG changes with stress testing.  Likely EKG changes were fall positive in the presence of normal wall motion with baseline and stress echocardiogram.      Pertinent cardiac images and EKG reviewed independently.    Continue with current medical plan and lifestyle changes.  Return sooner for concerns or questions. If symptoms persist go to the ED  I have reviewed all pertinent data including patient's medical history in detail and updated the computerized patient record.     No orders of the defined types were placed in this encounter.      Follow up as scheduled.     He expressed verbal understanding and agreed with the plan    Patient's Medications   New Prescriptions    No medications on file   Previous Medications    FERROUS GLUCONATE (FERGON) 324 MG TABLET    Take 1 tablet (324 mg total) by mouth daily with breakfast.    LOSARTAN-HYDROCHLOROTHIAZIDE 50-12.5 MG (HYZAAR) 50-12.5 MG PER TABLET    Take 1 tablet by mouth once daily.    MULTIVITAMIN (THERAGRAN) PER TABLET    Take 1 tablet by mouth once daily.    OMEGA 3-DHA-EPA-FISH -1,000 MG CPDR CAPSULE    Take by mouth once daily.    ROSUVASTATIN (CRESTOR) 20 MG TABLET    Take 1 tablet (20 mg total) by mouth once daily.    SILDENAFIL (VIAGRA) 100 MG TABLET    Take 1 tablet (100 mg total) by mouth daily as needed for Erectile Dysfunction.   Modified Medications    No medications on file   Discontinued Medications    No medications on file        Rj Lin M.D

## 2024-10-16 NOTE — PROGRESS NOTES
Clinic Note  10/16/2024      Subjective:         Chief Complaint:   HPI  Nilton Chin is a 64 y.o. male diagnosed with prostate cancer. Consult from Dr. Lopez.  Here with wife Maisha and daughter.  Negative family history.  [place] [employer]  robotic assisted laparoscopic prostatectomy 11/25/2019- [id number], [place] 3+4.  Continent and pad free. Doing Kegels. Continent and pad free since week 1.  Used no opioids used post op.     Stage-T2  PSA 4.4  Volume- 16 ccs (MRI)  MRI- 3.2 cm RPZ PI-RADS 5 lesion. Negative extra prostatic extension, NVB, Bladder, seminal vesicle, nodes.  Biopsy- 10/4 [place] 8 right middle 2/2 60%; [place] 3+4 left apex 2/2 50%, right base 1/1 60%, right apex 2/2 60%; [place] 6 2/2 50%.  ADELE score- 5  NCCN- high risk     6/23/2022- completed salvage EBRT 4/21/2021. PSA <0.01.  Reports urinary urgency. Denies hematuria, dysuria. Good stream. Requesting pelvic floor PT.     6/22/2023-PSA in December <0.01.  Denies hematuria, unintended weight loss, urinary difficulties.     10/17/2024-History as above. Concerned about UTI due to foul smelling urine. No fever, chills frequency, dysuria.  UA- normal today. Going on 2 week [place].      Lab Results   Component Value Date    PSA <0.01 08/28/2024    PSA <0.01 07/27/2023    PSA 0.16 08/14/2020    PSA 4.4 (H) 08/14/2019    PSA 3.5 08/08/2018    PSA 3.3 08/08/2017    PSA 2.3 06/02/2016    PSA 0.80 11/05/2012    PSA 2.26 09/27/2012    PSA 1.1 07/15/2011    PSADIAG <0.01 06/20/2024    PSADIAG <0.01 12/05/2023    PSADIAG <0.01 12/15/2022    PSADIAG <0.01 06/20/2022    PSADIAG <0.01 12/15/2021    PSADIAG <0.01 07/16/2021    PSADIAG 0.20 11/18/2020    PSADIAG 0.14 07/14/2020    PSADIAG 0.02 01/14/2020    PSADIAG 3.0 11/07/2018      Past Medical History:   Diagnosis Date    Acquired spondylolisthesis 08/29/2022    Allergy     Anal fissure 06/08/2016    Aortic atherosclerosis     Displacement of cervical intervertebral disc  08/29/2022    Hiatal hernia     History of prostatitis     Hypertension     Iliotibial band syndrome affecting lower leg 06/24/2015    Thoracic facet syndrome 08/29/2022     Family History   Problem Relation Name Age of Onset    Cataracts Mother      Hypertension Mother      Cataracts Father      Glaucoma Father      Hypertension Father      Colon cancer Paternal Uncle      Diabetes Maternal Grandmother      Heart disease Maternal Grandmother      Cancer Paternal Grandfather          lung/prostate    Diabetes Paternal Grandfather      Blindness Other PGGM     Amblyopia Neg Hx      Strabismus Neg Hx      Retinal detachment Neg Hx      Macular degeneration Neg Hx      Stroke Neg Hx      Melanoma Neg Hx      Esophageal cancer Neg Hx       Social History     Socioeconomic History    Marital status:     Number of children: 3   Occupational History    Occupation:    Tobacco Use    Smoking status: Former     Types: Cigarettes     Start date: 6/24/2013    Smokeless tobacco: Never    Tobacco comments:     6 cig a day   Substance and Sexual Activity    Alcohol use: Yes     Comment: socially/ not weekly    Drug use: No    Sexual activity: Yes     Partners: Female     Social Drivers of Health     Financial Resource Strain: Low Risk  (3/1/2024)    Overall Financial Resource Strain (CARDIA)     Difficulty of Paying Living Expenses: Not hard at all   Food Insecurity: No Food Insecurity (3/1/2024)    Hunger Vital Sign     Worried About Running Out of Food in the Last Year: Never true     Ran Out of Food in the Last Year: Never true   Transportation Needs: No Transportation Needs (3/1/2024)    PRAPARE - Transportation     Lack of Transportation (Medical): No     Lack of Transportation (Non-Medical): No   Physical Activity: Sufficiently Active (3/1/2024)    Exercise Vital Sign     Days of Exercise per Week: 3 days     Minutes of Exercise per Session: 60 min   Stress: No Stress Concern Present (3/1/2024)     [facility] of Occupational Health - Occupational Stress Questionnaire     Feeling of Stress : Not at all   Housing Stability: Low Risk  (3/1/2024)    Housing Stability Vital Sign     Unable to Pay for Housing in the Last Year: No     Number of Places Lived in the Last Year: 1     Unstable Housing in the Last Year: No     Past Surgical History:   Procedure Laterality Date    COLONOSCOPY      COLONOSCOPY N/A 10/12/2023    Procedure: COLONOSCOPY;  Surgeon: Almas Jean MD;  Location: [facility] (4TH FLR);  Service: Endoscopy;  Laterality: N/A;  9/8- referred by Dr. Jean/ Prep instrucitons to portal >AS  10/6-precall complete-MS    ESOPHAGOGASTRODUODENOSCOPY N/A 6/27/2023    Procedure: EGD (ESOPHAGOGASTRODUODENOSCOPY);  Surgeon: Almas Jean MD;  Location: [facility] (4TH FLR);  Service: Endoscopy;  Laterality: N/A;  Egd with 96 hr sheppard  5-6 weeks Dr. Jean  off /ppi/no allergy yo metal or jewlery  prep instructions sent to pt via portal and email:[email or web address]  6/21 pre-call no answer; MB    PH MONITORING, ESOPHAGUS, WIRELESS, (OFF REFLUX MEDS) N/A 6/27/2023    Procedure: PH MONITORING, ESOPHAGUS, WIRELESS, (OFF REFLUX MEDS);  Surgeon: Almas Jean MD;  Location: [facility] (4TH FLR);  Service: Endoscopy;  Laterality: N/A;    PROSTATE BIOPSY      ROBOT-ASSISTED LAPAROSCOPIC PROSTATECTOMY USING DA EMELY XI N/A 11/25/2019    Procedure: XI ROBOTIC PROSTATECTOMY;  Surgeon: Jose Carlos Lawrence MD;  Location: [facility] 2ND FLR;  Service: Urology;  Laterality: N/A;  3hrs gen with regional     UPPER GASTROINTESTINAL ENDOSCOPY       Patient Active Problem List   Diagnosis    Cervical radiculopathy    Hypertension    Other hyperlipidemia    Prostate cancer    Erectile dysfunction following radical prostatectomy    Urgency of urination    Pelvic floor dysfunction    Displacement of lumbar intervertebral disc without myelopathy    Aortic atherosclerosis    Iron deficiency anemia     Review of Systems  "  Constitutional:  Negative for appetite change, chills, fatigue, fever and unexpected weight change.   HENT:  Negative for nosebleeds.    Respiratory:  Negative for shortness of breath and wheezing.    Cardiovascular:  Negative for chest pain, palpitations and leg swelling.   Gastrointestinal:  Negative for abdominal distention, abdominal pain, constipation, diarrhea, nausea and vomiting.   Genitourinary:  Negative for dysuria, frequency, hematuria and urgency.   Musculoskeletal:  Negative for arthralgias and back pain.   Skin:  Negative for pallor.   Neurological:  Negative for dizziness, seizures and syncope.   Hematological:  Negative for adenopathy.   Psychiatric/Behavioral:  Negative for dysphoric mood.          Objective:      There were no vitals taken for this visit.  Estimated body mass index is 25.12 kg/m² as calculated from the following:    Height as of 10/11/24: 5' 7" (1.702 m).    Weight as of 10/11/24: 72.7 kg (160 lb 6.2 oz).  Physical Exam      Assessment and Plan:           Problem List Items Addressed This Visit       Prostate cancer - Primary       Follow up:   F/U prn    Jose Carlos Lawrence          "

## 2024-10-17 ENCOUNTER — OFFICE VISIT (OUTPATIENT)
Dept: UROLOGY | Facility: CLINIC | Age: 64
End: 2024-10-17
Payer: COMMERCIAL

## 2024-10-17 VITALS
DIASTOLIC BLOOD PRESSURE: 85 MMHG | HEIGHT: 67 IN | BODY MASS INDEX: 25.35 KG/M2 | WEIGHT: 161.5 LBS | HEART RATE: 70 BPM | SYSTOLIC BLOOD PRESSURE: 144 MMHG

## 2024-10-17 DIAGNOSIS — C61 PROSTATE CANCER: Primary | ICD-10-CM

## 2024-10-17 PROCEDURE — 1160F RVW MEDS BY RX/DR IN RCRD: CPT | Mod: CPTII,S$GLB,, | Performed by: UROLOGY

## 2024-10-17 PROCEDURE — 1159F MED LIST DOCD IN RCRD: CPT | Mod: CPTII,S$GLB,, | Performed by: UROLOGY

## 2024-10-17 PROCEDURE — 3079F DIAST BP 80-89 MM HG: CPT | Mod: CPTII,S$GLB,, | Performed by: UROLOGY

## 2024-10-17 PROCEDURE — 3044F HG A1C LEVEL LT 7.0%: CPT | Mod: CPTII,S$GLB,, | Performed by: UROLOGY

## 2024-10-17 PROCEDURE — 3077F SYST BP >= 140 MM HG: CPT | Mod: CPTII,S$GLB,, | Performed by: UROLOGY

## 2024-10-17 PROCEDURE — 99999 PR PBB SHADOW E&M-EST. PATIENT-LVL III: CPT | Mod: PBBFAC,,, | Performed by: UROLOGY

## 2024-10-17 PROCEDURE — 99214 OFFICE O/P EST MOD 30 MIN: CPT | Mod: S$GLB,,, | Performed by: UROLOGY

## 2024-10-17 PROCEDURE — 3008F BODY MASS INDEX DOCD: CPT | Mod: CPTII,S$GLB,, | Performed by: UROLOGY

## 2024-10-22 ENCOUNTER — TELEPHONE (OUTPATIENT)
Dept: CARDIOLOGY | Facility: CLINIC | Age: 64
End: 2024-10-22
Payer: COMMERCIAL

## 2024-10-22 DIAGNOSIS — I10 HYPERTENSION, UNSPECIFIED TYPE: ICD-10-CM

## 2024-10-22 DIAGNOSIS — R94.39 POSITIVE CARDIAC STRESS TEST: Primary | ICD-10-CM

## 2024-10-22 DIAGNOSIS — R93.1 ELEVATED CORONARY ARTERY CALCIUM SCORE: ICD-10-CM

## 2024-10-22 DIAGNOSIS — E78.5 HYPERLIPIDEMIA, UNSPECIFIED HYPERLIPIDEMIA TYPE: ICD-10-CM

## 2024-10-22 NOTE — TELEPHONE ENCOUNTER
----- Message from Marco Olsen MD sent at 10/22/2024 12:16 PM CDT -----  Regarding: RE: on cholesterol meds  Yes Lpa and HSCRP and tell him to also take omega-3 EPA /DHA  over 800 mg/d combined,CJL  ----- Message -----  From: Anu Navarro RN  Sent: 10/22/2024  11:58 AM CDT  To: Marco Olsen MD; Anu Navarro RN  Subject: on cholesterol meds                              Pt saw cards for abnormal stress test in [place] (abnormal EKG), stated ok but also started on statins for calcium score 206. He is scheduled w. you mostly for lipids  management on 12/18 as a new pt and due for lipids end of December (3 mths post starting statins on 9/28). Can I go ahead and schedule lipids cmp and whatever else you need prior to your appt (he already has a PSA ordered a few days before your appt)?    Please advise  ----- Message -----  From: Anu Navarro RN  Sent: 10/22/2024  11:53 AM CDT  To: Anu Navarro RN  Subject: FW: Urgent Appt                                    ----- Message -----  From: Von Pugh LPN  Sent: 10/10/2024   9:59 AM CDT  To: [facility] Staff  Subject: Urgent Appt                                      Good morning,    Attached pt is in need of urgent cardiac eval due to positive stress test. Pt has a personal history with Dr Olsen and has known Dr Olsen since childhood. Is there anyway he can be squeezed asap?   DC instructions

## 2024-10-22 NOTE — TELEPHONE ENCOUNTER
Labs ordered and added up to scheduled pSA. Told pt earlier to fast 10 hrs prior to labs and he had verbalized understanding. EKG scheduled as I told pt. Pt won't be available for 2 weeks so message sent about the omega 3 instructions

## 2024-12-10 ENCOUNTER — PATIENT MESSAGE (OUTPATIENT)
Dept: CARDIOLOGY | Facility: CLINIC | Age: 64
End: 2024-12-10
Payer: COMMERCIAL

## 2024-12-10 ENCOUNTER — LAB VISIT (OUTPATIENT)
Dept: LAB | Facility: HOSPITAL | Age: 64
End: 2024-12-10
Attending: RADIOLOGY
Payer: COMMERCIAL

## 2024-12-10 DIAGNOSIS — E78.5 HYPERLIPIDEMIA, UNSPECIFIED HYPERLIPIDEMIA TYPE: ICD-10-CM

## 2024-12-10 DIAGNOSIS — C61 PROSTATE CANCER: ICD-10-CM

## 2024-12-10 DIAGNOSIS — R93.1 ELEVATED CORONARY ARTERY CALCIUM SCORE: ICD-10-CM

## 2024-12-10 DIAGNOSIS — I10 HYPERTENSION, UNSPECIFIED TYPE: ICD-10-CM

## 2024-12-10 DIAGNOSIS — R94.39 POSITIVE CARDIAC STRESS TEST: ICD-10-CM

## 2024-12-10 LAB
ALBUMIN SERPL BCP-MCNC: 4 G/DL (ref 3.5–5.2)
ALP SERPL-CCNC: 58 U/L (ref 40–150)
ALT SERPL W/O P-5'-P-CCNC: 26 U/L (ref 10–44)
ANION GAP SERPL CALC-SCNC: 7 MMOL/L (ref 8–16)
AST SERPL-CCNC: 23 U/L (ref 10–40)
BILIRUB SERPL-MCNC: 0.8 MG/DL (ref 0.1–1)
BUN SERPL-MCNC: 15 MG/DL (ref 8–23)
CALCIUM SERPL-MCNC: 9.2 MG/DL (ref 8.7–10.5)
CHLORIDE SERPL-SCNC: 102 MMOL/L (ref 95–110)
CHOLEST SERPL-MCNC: 111 MG/DL (ref 120–199)
CHOLEST/HDLC SERPL: 2.4 {RATIO} (ref 2–5)
CO2 SERPL-SCNC: 27 MMOL/L (ref 23–29)
COMPLEXED PSA SERPL-MCNC: <0.01 NG/ML (ref 0–4)
CREAT SERPL-MCNC: 1 MG/DL (ref 0.5–1.4)
CRP SERPL-MCNC: 0.78 MG/L (ref 0–3.19)
EST. GFR  (NO RACE VARIABLE): >60 ML/MIN/1.73 M^2
GLUCOSE SERPL-MCNC: 103 MG/DL (ref 70–110)
HDLC SERPL-MCNC: 47 MG/DL (ref 40–75)
HDLC SERPL: 42.3 % (ref 20–50)
LDLC SERPL CALC-MCNC: 32.6 MG/DL (ref 63–159)
NONHDLC SERPL-MCNC: 64 MG/DL
POTASSIUM SERPL-SCNC: 4.6 MMOL/L (ref 3.5–5.1)
PROT SERPL-MCNC: 6.8 G/DL (ref 6–8.4)
SODIUM SERPL-SCNC: 136 MMOL/L (ref 136–145)
TRIGL SERPL-MCNC: 157 MG/DL (ref 30–150)

## 2024-12-10 PROCEDURE — 83695 ASSAY OF LIPOPROTEIN(A): CPT | Performed by: INTERNAL MEDICINE

## 2024-12-10 PROCEDURE — 80053 COMPREHEN METABOLIC PANEL: CPT | Performed by: INTERNAL MEDICINE

## 2024-12-10 PROCEDURE — 80061 LIPID PANEL: CPT | Performed by: INTERNAL MEDICINE

## 2024-12-10 PROCEDURE — 86141 C-REACTIVE PROTEIN HS: CPT | Performed by: INTERNAL MEDICINE

## 2024-12-10 PROCEDURE — 84153 ASSAY OF PSA TOTAL: CPT | Performed by: RADIOLOGY

## 2024-12-10 NOTE — PROGRESS NOTES
Your results look fine and do not require any change in treatment.     Please contact me if you have any additional concerns.    Sincerely,    Marco Olsen

## 2024-12-12 LAB — LPA SERPL-MCNC: <5 MG/DL (ref 0–30)

## 2024-12-16 PROBLEM — R93.1 AGATSTON CAC SCORE 200-399: Status: ACTIVE | Noted: 2024-12-16

## 2024-12-16 PROBLEM — I35.1 AORTIC REGURGITATION: Status: ACTIVE | Noted: 2024-12-16

## 2024-12-16 PROBLEM — R94.39 ABNORMAL STRESS ECG: Status: ACTIVE | Noted: 2024-12-16

## 2024-12-16 NOTE — PROGRESS NOTES
Subjective:   Patient ID:  Nilton Chin is a 64 y.o. male who presents for eval of CAD risk    HPI:  .cadrisks with CAC low 200s, abnormal stress ECG and mild dyslipidemiaThe patient has no chest pain,  TIA, palpitations, syncope or pre-syncope.He's had some SOB and ex capacity mildly down on stress .He just saw a Cards at another campus in October.Patient currently exercises many times per week.        Review of Systems   Constitutional: Negative for chills, decreased appetite, diaphoresis, fever, malaise/fatigue, night sweats, weight gain and weight loss.   HENT:  Negative for congestion, hoarse voice, nosebleeds, sore throat and tinnitus.    Eyes:  Negative for blurred vision, double vision, vision loss in left eye, vision loss in right eye, visual disturbance and visual halos.   Cardiovascular:  Negative for chest pain, claudication, cyanosis, dyspnea on exertion, irregular heartbeat, leg swelling, near-syncope, orthopnea, palpitations, paroxysmal nocturnal dyspnea and syncope.   Respiratory:  Positive for shortness of breath. Negative for cough, hemoptysis, sleep disturbances due to breathing, snoring, sputum production and wheezing.    Endocrine: Negative for cold intolerance, heat intolerance, polydipsia, polyphagia and polyuria.   Hematologic/Lymphatic: Negative for adenopathy and bleeding problem. Does not bruise/bleed easily.   Skin:  Negative for color change, dry skin, flushing, itching, nail changes, poor wound healing, rash, skin cancer, suspicious lesions and unusual hair distribution.   Musculoskeletal:  Negative for arthritis, back pain, falls, gout, joint pain, joint swelling, muscle cramps, muscle weakness, myalgias and stiffness.   Gastrointestinal:  Negative for abdominal pain, anorexia, change in bowel habit, constipation, diarrhea, dysphagia, heartburn, hematemesis, hematochezia, melena and vomiting.   Genitourinary:  Negative for decreased libido, dysuria, hematuria, hesitancy and urgency.  "  Neurological:  Negative for excessive daytime sleepiness, dizziness, focal weakness, headaches, light-headedness, loss of balance, numbness, paresthesias, seizures, sensory change, tremors, vertigo and weakness.   Psychiatric/Behavioral:  Negative for altered mental status, depression, hallucinations, memory loss, substance abuse and suicidal ideas. The patient does not have insomnia and is not nervous/anxious.    Allergic/Immunologic: Negative for environmental allergies and hives.       Objective: /74 (BP Location: Left arm, Patient Position: Sitting)   Pulse 64   Ht 5' 7" (1.702 m)   Wt 75.1 kg (165 lb 9.1 oz)   SpO2 99%   BMI 25.93 kg/m²      Physical Exam  Constitutional:       General: He is not in acute distress.     Appearance: He is well-developed. He is not diaphoretic.   HENT:      Head: Normocephalic.   Eyes:      Pupils: Pupils are equal, round, and reactive to light.   Neck:      Thyroid: No thyromegaly.   Cardiovascular:      Rate and Rhythm: Normal rate and regular rhythm.      Pulses: Intact distal pulses.           Carotid pulses are 3+ on the right side and 3+ on the left side.       Radial pulses are 3+ on the right side and 3+ on the left side.        Femoral pulses are 3+ on the right side and 3+ on the left side.       Popliteal pulses are 3+ on the right side and 3+ on the left side.        Dorsalis pedis pulses are 3+ on the right side and 3+ on the left side.        Posterior tibial pulses are 3+ on the right side and 3+ on the left side.      Heart sounds: Normal heart sounds. No murmur heard.     No friction rub. No gallop.   Pulmonary:      Effort: Pulmonary effort is normal. No respiratory distress.      Breath sounds: Normal breath sounds. No wheezing or rales.   Chest:      Chest wall: No tenderness.   Abdominal:      General: There is no distension.      Palpations: Abdomen is soft. There is no mass.      Tenderness: There is no abdominal tenderness.   Musculoskeletal:   "       General: Normal range of motion.      Cervical back: Normal range of motion.   Lymphadenopathy:      Cervical: No cervical adenopathy.   Skin:     General: Skin is warm.      Nails: There is no clubbing.   Neurological:      Mental Status: He is alert and oriented to person, place, and time.   Psychiatric:         Speech: Speech normal.         Behavior: Behavior normal.         Thought Content: Thought content normal.         Judgment: Judgment normal.       Reviewed labs and stress echo  and CAC  Assessment:     1. Abnormal stress ECG    2. Agatston CAC score 200-399    3. Aortic atherosclerosis    4. Aortic valve insufficiency, etiology of cardiac valve disease unspecified    5. Prostate cancer        Plan:   Discussed diet , achieving and maintaining ideal body weight, and exercise.   We reviewed meds in detail.  Reassured-Discussed goals, options, plan.  Omega-3 > 800 mg/d combined EPA/DHA.  Goal LDL < 70 ( but we do not need in the 30s).  Consider baby ASA   Repeat stress in 2-3 years     Diagnoses and all orders for this visit:    Abnormal stress ECG    Agatston CAC score 200-399  -     Lipid Panel; Future  -     Comprehensive Metabolic Panel; Future  -     CRP, High Sensitivity; Future  -     BNP; Future    Aortic atherosclerosis  -     Lipid Panel; Future  -     Comprehensive Metabolic Panel; Future  -     CRP, High Sensitivity; Future  -     BNP; Future    Aortic valve insufficiency, etiology of cardiac valve disease unspecified    Prostate cancer            Follow up in about 15 months (around 3/18/2026) for No labs ; get labs 6 months.

## 2024-12-18 ENCOUNTER — OFFICE VISIT (OUTPATIENT)
Dept: CARDIOLOGY | Facility: CLINIC | Age: 64
End: 2024-12-18
Payer: COMMERCIAL

## 2024-12-18 ENCOUNTER — HOSPITAL ENCOUNTER (OUTPATIENT)
Dept: CARDIOLOGY | Facility: CLINIC | Age: 64
Discharge: HOME | End: 2024-12-18
Payer: COMMERCIAL

## 2024-12-18 VITALS
SYSTOLIC BLOOD PRESSURE: 130 MMHG | OXYGEN SATURATION: 99 % | BODY MASS INDEX: 25.99 KG/M2 | HEIGHT: 67 IN | WEIGHT: 165.56 LBS | HEART RATE: 64 BPM | DIASTOLIC BLOOD PRESSURE: 74 MMHG

## 2024-12-18 DIAGNOSIS — R93.1 AGATSTON CAC SCORE 200-399: ICD-10-CM

## 2024-12-18 DIAGNOSIS — R94.39 ABNORMAL STRESS ECG: Primary | ICD-10-CM

## 2024-12-18 DIAGNOSIS — R94.39 POSITIVE CARDIAC STRESS TEST: ICD-10-CM

## 2024-12-18 DIAGNOSIS — I70.0 AORTIC ATHEROSCLEROSIS: ICD-10-CM

## 2024-12-18 DIAGNOSIS — C61 PROSTATE CANCER: ICD-10-CM

## 2024-12-18 DIAGNOSIS — E78.5 HYPERLIPIDEMIA, UNSPECIFIED HYPERLIPIDEMIA TYPE: ICD-10-CM

## 2024-12-18 DIAGNOSIS — I35.1 AORTIC VALVE INSUFFICIENCY, ETIOLOGY OF CARDIAC VALVE DISEASE UNSPECIFIED: ICD-10-CM

## 2024-12-18 DIAGNOSIS — R93.1 ELEVATED CORONARY ARTERY CALCIUM SCORE: ICD-10-CM

## 2024-12-18 DIAGNOSIS — I10 HYPERTENSION, UNSPECIFIED TYPE: ICD-10-CM

## 2024-12-18 LAB
OHS QRS DURATION: 88 MS
OHS QTC CALCULATION: 385 MS

## 2024-12-18 PROCEDURE — 3008F BODY MASS INDEX DOCD: CPT | Mod: CPTII,S$GLB,, | Performed by: INTERNAL MEDICINE

## 2024-12-18 PROCEDURE — 3075F SYST BP GE 130 - 139MM HG: CPT | Mod: CPTII,S$GLB,, | Performed by: INTERNAL MEDICINE

## 2024-12-18 PROCEDURE — 99999 PR PBB SHADOW E&M-EST. PATIENT-LVL IV: CPT | Mod: PBBFAC,,, | Performed by: INTERNAL MEDICINE

## 2024-12-18 PROCEDURE — 99204 OFFICE O/P NEW MOD 45 MIN: CPT | Mod: S$GLB,,, | Performed by: INTERNAL MEDICINE

## 2024-12-18 PROCEDURE — 1159F MED LIST DOCD IN RCRD: CPT | Mod: CPTII,S$GLB,, | Performed by: INTERNAL MEDICINE

## 2024-12-18 PROCEDURE — 3044F HG A1C LEVEL LT 7.0%: CPT | Mod: CPTII,S$GLB,, | Performed by: INTERNAL MEDICINE

## 2024-12-18 PROCEDURE — 93010 ELECTROCARDIOGRAM REPORT: CPT | Mod: S$GLB,,, | Performed by: INTERNAL MEDICINE

## 2024-12-18 PROCEDURE — 93005 ELECTROCARDIOGRAM TRACING: CPT | Mod: S$GLB,,, | Performed by: INTERNAL MEDICINE

## 2024-12-18 PROCEDURE — 3078F DIAST BP <80 MM HG: CPT | Mod: CPTII,S$GLB,, | Performed by: INTERNAL MEDICINE

## 2024-12-18 RX ORDER — FERROUS GLUCONATE 324(38)MG
324 TABLET ORAL
COMMUNITY

## 2024-12-18 NOTE — PATIENT INSTRUCTIONS
Discussed diet , achieving and maintaining ideal body weight, and exercise.   We reviewed meds in detail.  Reassured-Discussed goals, options, plan.  Omega-3 > 800 mg/d combined EPA/DHA.Reduce his pills to 1 alt with 2  Goal LDL < 70 ( but we do not need in the 30s).Reduce to one alt with half  Consider baby ASA -I favor  Repeat stress in 2-3 years

## 2024-12-30 ENCOUNTER — OFFICE VISIT (OUTPATIENT)
Dept: RADIATION ONCOLOGY | Facility: CLINIC | Age: 64
End: 2024-12-30
Attending: RADIOLOGY
Payer: COMMERCIAL

## 2024-12-30 VITALS
RESPIRATION RATE: 16 BRPM | DIASTOLIC BLOOD PRESSURE: 69 MMHG | HEART RATE: 79 BPM | WEIGHT: 168.88 LBS | BODY MASS INDEX: 26.51 KG/M2 | SYSTOLIC BLOOD PRESSURE: 123 MMHG | HEIGHT: 67 IN

## 2024-12-30 DIAGNOSIS — N52.31 ERECTILE DYSFUNCTION FOLLOWING RADICAL PROSTATECTOMY: ICD-10-CM

## 2024-12-30 DIAGNOSIS — C61 PROSTATE CANCER: Primary | ICD-10-CM

## 2024-12-30 PROCEDURE — 3008F BODY MASS INDEX DOCD: CPT | Mod: CPTII,S$GLB,, | Performed by: RADIOLOGY

## 2024-12-30 PROCEDURE — 3074F SYST BP LT 130 MM HG: CPT | Mod: CPTII,S$GLB,, | Performed by: RADIOLOGY

## 2024-12-30 PROCEDURE — 3078F DIAST BP <80 MM HG: CPT | Mod: CPTII,S$GLB,, | Performed by: RADIOLOGY

## 2024-12-30 PROCEDURE — 1159F MED LIST DOCD IN RCRD: CPT | Mod: CPTII,S$GLB,, | Performed by: RADIOLOGY

## 2024-12-30 PROCEDURE — 99212 OFFICE O/P EST SF 10 MIN: CPT | Mod: S$GLB,,, | Performed by: RADIOLOGY

## 2024-12-30 PROCEDURE — 1160F RVW MEDS BY RX/DR IN RCRD: CPT | Mod: CPTII,S$GLB,, | Performed by: RADIOLOGY

## 2024-12-30 PROCEDURE — 99999 PR PBB SHADOW E&M-EST. PATIENT-LVL III: CPT | Mod: PBBFAC,,, | Performed by: RADIOLOGY

## 2024-12-30 PROCEDURE — 3044F HG A1C LEVEL LT 7.0%: CPT | Mod: CPTII,S$GLB,, | Performed by: RADIOLOGY

## 2024-12-30 RX ORDER — SILDENAFIL 100 MG/1
100 TABLET, FILM COATED ORAL DAILY PRN
Qty: 30 TABLET | Refills: 6 | Status: SHIPPED | OUTPATIENT
Start: 2024-12-30 | End: 2025-07-28

## 2024-12-30 NOTE — PROGRESS NOTES
[facility] - Radiation Oncology     Patient ID: Nilton Chin is a 64 y.o. male.    Chief Complaint: Prostate Cancer (Yrly eval;psa)      Mr. Chin has a history of pathologic Stage IIIB (pT3a, pN0, cM0, PSA: 4.4, Grade Group: 2) adenocarcinoma of the prostate.  The patient initially presented in August of 2019 with a slightly elevated screening PSA of 4.4 ng/ml.  Biopsies revealed [place] 8 (4+4) adenocarcinoma in the Rt. mid gland, [place] 7 (3+4) adenocarcinoma at the Lt. apex, Rt. apex and Rt. base and [place] 6 (3+3) disease at the Lt. mid gland.  He underwent RALP with bilateral tori dissection on 11/25/19.  Pathology revealed [place] 7 (3+4) adenocarcinoma with extraprostatic extension posteriorly at the apex.  There was no seminal vesicle or urinary bladder neck invasion.  The margins of resection were negative.  Two Rt. and 1 Lt. pelvic nodes were negative for tumor involvement.  He was recently referred for salvage irradiation after his PSA increased to 0.2 ng/ml.  He received a 6 month Lupron injection on 12/10/20.  He completed salvage radiotherapy to the prostate bed and pelvic nodes on 4/1/21. He has remained ABHISHEK since that time. Today the patient states he feels well.  No complaints.      Review of Systems   Constitutional:  Negative for activity change, appetite change, chills and fever.   Genitourinary:  Positive for erectile dysfunction (corrected with sildenafil). Negative for bladder incontinence, difficulty urinating, dysuria, frequency and hematuria.     Physical Exam  Constitutional:       General: He is not in acute distress.     Appearance: Normal appearance.   Neurological:      Mental Status: He is alert and oriented to person, place, and time.   Psychiatric:         Mood and Affect: Mood normal.         Judgment: Judgment normal.        Latest Reference Range & Units 12/10/24 08:07   PSA Diagnostic 0.00 - 4.00 ng/mL <0.01          Assessment and Plan   Doing  well  no evidence of tumor recurrence.  Plan follow up PSA in 6 months with phone review.  RTC in 1 year with PSA

## 2025-01-29 RX ORDER — FERROUS GLUCONATE 324(38)MG
324 TABLET ORAL
Qty: 90 TABLET | Refills: 0 | Status: SHIPPED | OUTPATIENT
Start: 2025-01-29 | End: 2025-04-29

## 2025-02-05 ENCOUNTER — PATIENT MESSAGE (OUTPATIENT)
Dept: CARDIOLOGY | Facility: CLINIC | Age: 65
End: 2025-02-05
Payer: COMMERCIAL

## 2025-02-05 ENCOUNTER — PATIENT MESSAGE (OUTPATIENT)
Dept: RADIATION ONCOLOGY | Facility: CLINIC | Age: 65
End: 2025-02-05
Payer: COMMERCIAL

## 2025-05-07 ENCOUNTER — OFFICE VISIT (OUTPATIENT)
Dept: OPTOMETRY | Facility: CLINIC | Age: 65
End: 2025-05-07
Payer: COMMERCIAL

## 2025-05-07 DIAGNOSIS — H04.123 DRY EYES, BILATERAL: ICD-10-CM

## 2025-05-07 DIAGNOSIS — H25.13 NUCLEAR SCLEROSIS, BILATERAL: Primary | ICD-10-CM

## 2025-05-07 DIAGNOSIS — H52.4 PRESBYOPIA: ICD-10-CM

## 2025-05-07 PROCEDURE — 1159F MED LIST DOCD IN RCRD: CPT | Mod: CPTII,S$GLB,, | Performed by: OPTOMETRIST

## 2025-05-07 PROCEDURE — 99999 PR PBB SHADOW E&M-EST. PATIENT-LVL II: CPT | Mod: PBBFAC,,, | Performed by: OPTOMETRIST

## 2025-05-07 PROCEDURE — 92015 DETERMINE REFRACTIVE STATE: CPT | Mod: S$GLB,,, | Performed by: OPTOMETRIST

## 2025-05-07 PROCEDURE — 92014 COMPRE OPH EXAM EST PT 1/>: CPT | Mod: S$GLB,,, | Performed by: OPTOMETRIST

## 2025-05-07 PROCEDURE — 1160F RVW MEDS BY RX/DR IN RCRD: CPT | Mod: CPTII,S$GLB,, | Performed by: OPTOMETRIST

## 2025-05-07 NOTE — PROGRESS NOTES
HPI    Pt is here for routine eye exam    Pt states no eye pain or discomfort. No flashes or floaters. States vision   has been stable. No other complaints. Wants to check overall eye health   and possibly get a new Rx.  Last edited by Behzad Bolivar MA on 5/7/2025  9:24 AM.            Assessment /Plan     For exam results, see Encounter Report.    Nuclear sclerosis, bilateral    Presbyopia    Dry eyes, bilateral      1. Small cats--pt happy w otc readers.  Discussed could use otc +1.00 for distance  2. Family History of Glaucoma (dad).  Advised yearly exam   3. Ocular rosacea hx--see prev notes--oral tx in past.  For now will try switch to SYSTANE COMPLETE Ats QID        PLAN:    rtc 1 yr

## 2025-05-15 RX ORDER — FERROUS GLUCONATE 324(38)MG
TABLET ORAL
Qty: 90 TABLET | Refills: 0 | Status: SHIPPED | OUTPATIENT
Start: 2025-05-15

## 2025-06-04 ENCOUNTER — LAB VISIT (OUTPATIENT)
Dept: LAB | Facility: HOSPITAL | Age: 65
End: 2025-06-04
Attending: RADIOLOGY
Payer: COMMERCIAL

## 2025-06-04 DIAGNOSIS — C61 PROSTATE CANCER: ICD-10-CM

## 2025-06-04 LAB — PSA SERPL-MCNC: <0.01 NG/ML

## 2025-06-04 PROCEDURE — 84153 ASSAY OF PSA TOTAL: CPT

## 2025-06-04 PROCEDURE — 36415 COLL VENOUS BLD VENIPUNCTURE: CPT | Mod: PO

## 2025-06-05 ENCOUNTER — RESULTS FOLLOW-UP (OUTPATIENT)
Dept: RADIATION ONCOLOGY | Facility: CLINIC | Age: 65
End: 2025-06-05

## 2025-06-06 ENCOUNTER — TELEPHONE (OUTPATIENT)
Dept: INTERNAL MEDICINE | Facility: CLINIC | Age: 65
End: 2025-06-06
Payer: COMMERCIAL

## 2025-06-06 DIAGNOSIS — Z00.00 ANNUAL PHYSICAL EXAM: ICD-10-CM

## 2025-06-06 DIAGNOSIS — I10 PRIMARY HYPERTENSION: ICD-10-CM

## 2025-06-06 DIAGNOSIS — C61 PROSTATE CANCER: Primary | ICD-10-CM

## 2025-06-09 ENCOUNTER — OFFICE VISIT (OUTPATIENT)
Dept: INTERNAL MEDICINE | Facility: CLINIC | Age: 65
End: 2025-06-09
Payer: COMMERCIAL

## 2025-06-09 ENCOUNTER — PATIENT MESSAGE (OUTPATIENT)
Dept: CARDIOLOGY | Facility: CLINIC | Age: 65
End: 2025-06-09
Payer: COMMERCIAL

## 2025-06-09 VITALS
TEMPERATURE: 98 F | HEIGHT: 67 IN | BODY MASS INDEX: 25.71 KG/M2 | DIASTOLIC BLOOD PRESSURE: 80 MMHG | SYSTOLIC BLOOD PRESSURE: 138 MMHG | WEIGHT: 163.81 LBS | RESPIRATION RATE: 18 BRPM | OXYGEN SATURATION: 99 % | HEART RATE: 79 BPM

## 2025-06-09 DIAGNOSIS — C61 PROSTATE CANCER: ICD-10-CM

## 2025-06-09 DIAGNOSIS — L29.89 PRURITIC ERYTHEMATOUS RASH: Primary | ICD-10-CM

## 2025-06-09 PROCEDURE — 3075F SYST BP GE 130 - 139MM HG: CPT | Mod: CPTII,S$GLB,, | Performed by: NURSE PRACTITIONER

## 2025-06-09 PROCEDURE — 3008F BODY MASS INDEX DOCD: CPT | Mod: CPTII,S$GLB,, | Performed by: NURSE PRACTITIONER

## 2025-06-09 PROCEDURE — 3079F DIAST BP 80-89 MM HG: CPT | Mod: CPTII,S$GLB,, | Performed by: NURSE PRACTITIONER

## 2025-06-09 PROCEDURE — 1160F RVW MEDS BY RX/DR IN RCRD: CPT | Mod: CPTII,S$GLB,, | Performed by: NURSE PRACTITIONER

## 2025-06-09 PROCEDURE — 99214 OFFICE O/P EST MOD 30 MIN: CPT | Mod: S$GLB,,, | Performed by: NURSE PRACTITIONER

## 2025-06-09 PROCEDURE — 1159F MED LIST DOCD IN RCRD: CPT | Mod: CPTII,S$GLB,, | Performed by: NURSE PRACTITIONER

## 2025-06-09 PROCEDURE — 99999 PR PBB SHADOW E&M-EST. PATIENT-LVL V: CPT | Mod: PBBFAC,,, | Performed by: NURSE PRACTITIONER

## 2025-06-09 RX ORDER — PREDNISONE 20 MG/1
20 TABLET ORAL DAILY
Qty: 20 TABLET | Refills: 0 | Status: SHIPPED | OUTPATIENT
Start: 2025-06-09

## 2025-06-09 NOTE — PROGRESS NOTES
Subjective:       Patient ID: Nilton Chin is a 65 y.o. male.    Chief Complaint: Rash    History of Present Illness    CHIEF COMPLAINT:  Patient presents today with a rash    CONTACT DERMATITIS:  He developed a rash one week ago after pulling nikia at a property in [place], suspected to be from poison ivy/oak exposure. The rash initially presented as 'three dots' on his RLE which has now spread to varying parts of his body, bilateral upper arms, L ring finger and L ear.  He describes the rash as itchy but denies pain or burning sensations. He has no prior history of poison ivy/oak reactions despite frequent visits to the property since childhood. He has tried hydrocortisone, which provided itch relief, and calamine lotion, though neither prevented rash spread.    SUNBURN:  He reports a recent sunburn from evening sun exposure while in the backyard by the pool and garden.    ROS:  Integumentary: +rash, +itching       Objective:      Physical Exam  Vitals reviewed.   Constitutional:       Appearance: Normal appearance.   HENT:      Head: Normocephalic and atraumatic.      Nose: Nose normal.      Mouth/Throat:      Mouth: Mucous membranes are moist.   Eyes:      Pupils: Pupils are equal, round, and reactive to light.   Cardiovascular:      Rate and Rhythm: Normal rate and regular rhythm.      Heart sounds: Normal heart sounds.   Pulmonary:      Effort: Pulmonary effort is normal.      Breath sounds: Normal breath sounds.   Abdominal:      General: Bowel sounds are normal.      Palpations: Abdomen is soft.   Musculoskeletal:         General: Normal range of motion.      Cervical back: Normal range of motion.   Skin:     General: Skin is warm and dry.      Comments: See attached images   Neurological:      General: No focal deficit present.      Mental Status: He is alert and oriented to person, place, and time.   Psychiatric:         Mood and Affect: Mood normal.         Behavior: Behavior normal.     LLE         LLE      L Hand      R Inner forearm      L Forearm      Assessment:       1. Pruritic erythematous rash  - predniSONE (DELTASONE) 20 MG tablet; Take 1 tablet (20 mg total) by mouth once daily. Take 3 pills daily for 3 days, then 2 pills daily for 3 days, then 1 pill daily for 3 days, then 1/2 pill daily for 4 days.  Dispense: 20 tablet; Refill: 0    2. Prostate cancer  -Chronic, stable.   -Recent PSA was normal.   -Continue to f/u with Heme/Onc Rad for ongoing management.       Plan:       Assessment & Plan    IMPRESSION:  Assessed rash presentation and patient history, suspecting poison ivy or poison oak exposure.  Initiated oral steroids for treatment given extent and progression of rash.    PLAN SUMMARY:  - Discontinue steroid cream provided by acquaintance  - Prescribed prednisone 60 mg daily for 3 days, then taper over 10 days  - Continue application of calamine lotion as needed for calming relief  - Advised against scratching affected areas    POISON IVY/OAK CONTACT DERMATITIS:  - Confirmed rash is consistent with poison ivy/oak exposure, which has spread from initial three dots to multiple areas including the ear over one week.  - Explained that this dermatitis is not contagious and discussed its typical progression, including potential for vesicles and serous drainage.  - Advised against scratching affected areas.  - Prescribed prednisone 60 mg daily for 3 days, then taper over 10 days total.  - Recommend continued application of calamine lotion as needed for pruritus relief.  - Instructed to discontinue use of steroid cream provided by acquaintance.    RTC PRN.     This note was generated with the assistance of ambient listening technology. Verbal consent was obtained by the patient and accompanying visitor(s) for the recording of patient appointment to facilitate this note. I attest to having reviewed and edited the generated note for accuracy, though some syntax or spelling errors may persist. Please  contact the author of this note for any clarification.

## 2025-09-03 RX ORDER — FERROUS GLUCONATE 324(38)MG
324 TABLET ORAL
Qty: 90 TABLET | Refills: 0 | Status: SHIPPED | OUTPATIENT
Start: 2025-09-03 | End: 2025-12-02

## (undated) DEVICE — SUT 2/0 36IN COATED VICRYL

## (undated) DEVICE — TRAY MINOR GEN SURG

## (undated) DEVICE — GOWN SURGICAL X-LARGE

## (undated) DEVICE — DRAPE SCOPE PILLOW WARMER

## (undated) DEVICE — IRRIGATOR ENDOSCOPY DISP.

## (undated) DEVICE — SUT MONOCRYL 3-0 UNDYED RB1

## (undated) DEVICE — SUT ABS CLIP LAPRA-TY CTD

## (undated) DEVICE — ELECTRODE REM PLYHSV RETURN 9

## (undated) DEVICE — TROCAR ENDOPATH XCEL 5MM 7.5CM

## (undated) DEVICE — SEAL UNIVERSAL 5MM-8MM XI

## (undated) DEVICE — CLIP HEMO-LOK MLX LARGE LF

## (undated) DEVICE — PORT AIRSEAL 12/120MM LPI

## (undated) DEVICE — DRAPE COLUMN DAVINCI XI

## (undated) DEVICE — LUBRICANT SURGILUBE 2 OZ

## (undated) DEVICE — SOL ELECTROLUBE ANTI-STIC

## (undated) DEVICE — SET TRI-LUMEN FILTERED TUBE

## (undated) DEVICE — LEGGINGS 48X31 INCH

## (undated) DEVICE — EVACUATOR WOUND BULB 100CC

## (undated) DEVICE — SOL WATER STRL IRR 1000ML

## (undated) DEVICE — COVER TIP CURVED SCISSORS XI

## (undated) DEVICE — Device

## (undated) DEVICE — DRAIN CHANNEL ROUND 10FR

## (undated) DEVICE — SOL NS 1000CC

## (undated) DEVICE — SYR 50ML CATH TIP

## (undated) DEVICE — SOL CLEARIFY VISUALIZATION LAP

## (undated) DEVICE — SUT PDS II 0 CT-1 VIL MONO

## (undated) DEVICE — ADHESIVE DERMABOND ADVANCED

## (undated) DEVICE — COVER LIGHT HANDLE

## (undated) DEVICE — BAG TISS RETRV MONARCH 10MM

## (undated) DEVICE — SUT MONOCRYL 3-0 RB1

## (undated) DEVICE — TRAY FOLEY 16FR INFECTION CONT

## (undated) DEVICE — NDL INSUF ULTRA VERESS 120MM

## (undated) DEVICE — SUT MCRYL PLUS 4-0 PS2 27IN

## (undated) DEVICE — DRAPE ABDOMINAL TIBURON 14X11

## (undated) DEVICE — SCISSOR 5MMX35CM DIRECT DRIVE

## (undated) DEVICE — DRAPE ARM DAVINCI XI